# Patient Record
Sex: MALE | Race: WHITE | NOT HISPANIC OR LATINO | Employment: OTHER | URBAN - METROPOLITAN AREA
[De-identification: names, ages, dates, MRNs, and addresses within clinical notes are randomized per-mention and may not be internally consistent; named-entity substitution may affect disease eponyms.]

---

## 2017-02-23 ENCOUNTER — OFFICE VISIT (OUTPATIENT)
Dept: PEDIATRICS | Facility: CLINIC | Age: 67
End: 2017-02-23
Payer: COMMERCIAL

## 2017-02-23 VITALS
SYSTOLIC BLOOD PRESSURE: 146 MMHG | DIASTOLIC BLOOD PRESSURE: 72 MMHG | BODY MASS INDEX: 39.19 KG/M2 | HEIGHT: 68 IN | WEIGHT: 258.6 LBS | TEMPERATURE: 97.6 F | OXYGEN SATURATION: 97 % | HEART RATE: 79 BPM

## 2017-02-23 DIAGNOSIS — I25.10 CORONARY ARTERY DISEASE INVOLVING NATIVE CORONARY ARTERY OF NATIVE HEART WITHOUT ANGINA PECTORIS: ICD-10-CM

## 2017-02-23 DIAGNOSIS — E11.65 TYPE 2 DIABETES MELLITUS WITH HYPERGLYCEMIA, WITHOUT LONG-TERM CURRENT USE OF INSULIN (H): Primary | ICD-10-CM

## 2017-02-23 DIAGNOSIS — K59.00 CONSTIPATION, UNSPECIFIED CONSTIPATION TYPE: ICD-10-CM

## 2017-02-23 DIAGNOSIS — E78.5 HYPERLIPIDEMIA WITH TARGET LDL LESS THAN 70: ICD-10-CM

## 2017-02-23 DIAGNOSIS — R04.0 EPISTAXIS: ICD-10-CM

## 2017-02-23 DIAGNOSIS — I10 ESSENTIAL HYPERTENSION WITH GOAL BLOOD PRESSURE LESS THAN 140/90: ICD-10-CM

## 2017-02-23 PROCEDURE — 99214 OFFICE O/P EST MOD 30 MIN: CPT | Performed by: INTERNAL MEDICINE

## 2017-02-23 RX ORDER — GLIPIZIDE 5 MG/1
5 TABLET, FILM COATED, EXTENDED RELEASE ORAL
Qty: 90 TABLET | Refills: 1 | Status: SHIPPED | OUTPATIENT
Start: 2017-02-23 | End: 2017-03-13

## 2017-02-23 RX ORDER — PIOGLITAZONEHYDROCHLORIDE 45 MG/1
45 TABLET ORAL DAILY
Qty: 90 TABLET | Refills: 1 | Status: SHIPPED | OUTPATIENT
Start: 2017-02-23 | End: 2017-06-12

## 2017-02-23 NOTE — PROGRESS NOTES
SUBJECTIVE:                                                    Héctor Quispe is a 66 year old male who presents to clinic today for the following health issues:    Diabetes Follow-up      Patient is checking blood sugars: not regularly    Diabetic concerns: None     Symptoms of hypoglycemia (low blood sugar): none     Paresthesias (numbness or burning in feet) or sores: No     Lab Results   Component Value Date    A1C 6.2 09/13/2016    A1C 6.2 01/06/2016        Hyperlipidemia Follow-Up      Rate your low fat/cholesterol diet?: fair    Taking statin?  Yes, no muscle aches from statin    Other lipid medications/supplements?:  Gemfibrozil  Lab Results   Component Value Date    LDL 63 01/06/2016    LDL 50 05/06/2015    LDL 61 11/25/2014    LDL 49 11/21/2013        Hypertension Follow-up      Outpatient blood pressures are not being checked.    Low Salt Diet: no added salt       Vascular Disease Follow-up:  Coronary Artery Disease (CAD)      Chest pain or pressure, left side neck or arm pain: No    Shortness of breath/increased sweats/nausea with exertion: No    Pain in calves walking 1-2 blocks: No    Worsened or new symptoms since last visit: No    Nitroglycerin use: no    Daily aspirin use: Yes       Epistaxis       Duration: 2 weeks    Description (location/character/radiation): awakens with nosebleeds, takes from 2-4 hours to get it to stop, always left nare    Intensity:  moderate, severe    Accompanying signs and symptoms: none    History (similar episodes/previous evaluation): had same problem last winter    Precipitating or alleviating factors: seasonal    Therapies tried and outcome: humidifier in bedroom       Patient Active Problem List   Diagnosis     Prostate cancer (H)     Colon polyps     Coronary artery disease involving native coronary artery of native heart without angina pectoris     Hyperlipidemia with target LDL less than 70     Essential hypertension with goal blood pressure less than 140/90      Type 2 diabetes mellitus with hyperglycemia, without long-term current use of insulin (H)     Past Surgical History   Procedure Laterality Date     Stent, coronary, george       h/o coronary stent x 2       Social History   Substance Use Topics     Smoking status: Former Smoker     Types: Cigarettes     Quit date: 8/2/2002     Smokeless tobacco: Never Used     Alcohol use Yes      Comment: occ.     Family History   Problem Relation Age of Onset     Alzheimer Disease Mother      CANCER Father          Current Outpatient Prescriptions   Medication Sig Dispense Refill     pioglitazone (ACTOS) 45 MG tablet Take 1 tablet (45 mg) by mouth daily 90 tablet 1     glipiZIDE (GLIPIZIDE XL) 5 MG 24 hr tablet Take 1 tablet (5 mg) by mouth daily (with breakfast) 90 tablet 1     metFORMIN (GLUCOPHAGE) 1000 MG tablet Take 1 tablet (1,000 mg) by mouth 2 times daily (with meals) 180 tablet 1     sitagliptin (JANUVIA) 100 MG tablet Take 1 tablet (100 mg) by mouth daily 90 tablet 1     nitroglycerin (NITROSTAT) 0.4 MG SL tablet Place 1 tablet (0.4 mg) under the tongue every 5 minutes as needed for chest pain 30 tablet 6     lisinopril (PRINIVIL,ZESTRIL) 10 MG tablet Take 1 tablet (10 mg) by mouth daily 90 tablet 3     metoprolol (TOPROL-XL) 25 MG 24 hr tablet Take 0.5 tablets (12.5 mg) by mouth daily 45 tablet 3     tamsulosin (FLOMAX) 0.4 MG 24 hr capsule Take 1 capsule (0.4 mg) by mouth daily 90 capsule 3     gemfibrozil (LOPID) 600 MG tablet Take 1 tablet (600 mg) by mouth 2 times daily 180 tablet 3     atorvastatin (LIPITOR) 20 MG tablet Take 1 tablet (20 mg) by mouth daily 90 tablet 3     tiZANidine (ZANAFLEX) 4 MG tablet Take 1 tablet (4 mg) by mouth 3 times daily as needed for muscle spasms 30 tablet 0     aspirin 81 MG tablet Take 1 tablet (81 mg) by mouth daily 90 tablet 11     order for DME Accu-check glucometer or per insurance coverage 1 Units 0     order for DME Accu-check glucometer test lancets, or per insurance  "coverage  Testing once daily 3 Month 3     order for DME Accu-check glucometer test strips or per insurance coverage  Testing once daily 3 Month 3     omeprazole 20 MG tablet Take 20 mg by mouth daily.         ROS: The following systems have been completely reviewed and are negative except as noted in the HPI: CONSTITUTIONAL,  CARDIOVASCULAR, PULMONARY    OBJECTIVE:                                                    /72 (BP Location: Right arm, Patient Position: Chair, Cuff Size: Adult Large)  Pulse 79  Temp 97.6  F (36.4  C) (Tympanic)  Ht 5' 7.5\" (1.715 m)  Wt 258 lb 9.6 oz (117.3 kg)  SpO2 97%  BMI 39.9 kg/m2 Body mass index is 39.9 kg/(m^2).  GENERAL:  alert,  no distress  NECK: no tenderness, no adenopathy  RESP: lungs clear to auscultation - no rales, no rhonchi, no wheezes  CV: regular rates and rhythm, normal S1 S2, no S3 or S4 and no murmur, no click or rub   MS: extremities- no edema     ASSESSMENT/PLAN:                                                        ICD-10-CM    1. Type 2 diabetes mellitus with hyperglycemia, without long-term current use of insulin (H) E11.65 Comprehensive metabolic panel     Hemoglobin A1c     Albumin Random Urine Quantitative     pioglitazone (ACTOS) 45 MG tablet     glipiZIDE (GLIPIZIDE XL) 5 MG 24 hr tablet     metFORMIN (GLUCOPHAGE) 1000 MG tablet     sitagliptin (JANUVIA) 100 MG tablet     Pt will return for fasting labwork  Current rx-metformin/glipizide/actos/januvia  januvia is expensive for him-discussed alternatives (SGLT2 inh, victoza, glargine insulin).       2. Essential hypertension with goal blood pressure less than 140/90 I10 BP slightly above goal, continue current rx recheck next follow-up     3. Coronary artery disease involving native coronary artery of native heart without angina pectoris I25.10 Stable on current rx         4. Hyperlipidemia with target LDL less than 70 E78.5 Lipid panel reflex to direct LDL     Well controlled, return for fasting " labwork     5. Epistaxis R04.0 OTOLARYNGOLOGY REFERRAL     Recurrent epistaxis, referred to ENT     6. Constipation, unspecified constipation type K59.00 otc rx reviewed        Jesus Cherry MD  [facility]

## 2017-02-23 NOTE — PATIENT INSTRUCTIONS
INSTRUCTIONS FOR TODAY:     schedule visit with ENT   constipation-start Citrucel daily, add Senokot if needed   Weight Watchers   return for fasting labwork in the next week     Dr Cherry

## 2017-02-23 NOTE — NURSING NOTE
"Chief Complaint   Patient presents with     Other     nosebleeds       Initial /72 (BP Location: Right arm, Patient Position: Chair, Cuff Size: Adult Large)  Pulse 79  Temp 97.6  F (36.4  C) (Tympanic)  Ht 5' 7.5\" (1.715 m)  Wt 258 lb 9.6 oz (117.3 kg)  SpO2 97%  BMI 39.9 kg/m2 Estimated body mass index is 39.9 kg/(m^2) as calculated from the following:    Height as of this encounter: 5' 7.5\" (1.715 m).    Weight as of this encounter: 258 lb 9.6 oz (117.3 kg).  Medication Reconciliation: les Cifuentes LPN      "

## 2017-02-23 NOTE — MR AVS SNAPSHOT
After Visit Summary   2/23/2017    Héctor Quispe    MRN: [id number]           Patient Information     Date Of Birth          1950        Visit Information        Provider Department      2/23/2017 11:00 AM Jesus Cherry MD [facility]        Today's Diagnoses     Type 2 diabetes mellitus with hyperglycemia, without long-term current use of insulin (H)    -  1    Essential hypertension with goal blood pressure less than 140/90        Coronary artery disease involving native coronary artery of native heart without angina pectoris        Hyperlipidemia with target LDL less than 70        Epistaxis        Constipation, unspecified constipation type          Care Instructions    INSTRUCTIONS FOR TODAY:     schedule visit with ENT   constipation-start Citrucel daily, add Senokot if needed   Weight Watchers   return for fasting labwork in the next week     Dr Cherry          Follow-ups after your visit        Additional Services     OTOLARYNGOLOGY REFERRAL       Your provider has referred you to: [facility]: [facility] [place] [facility] [place] [phone]   [email or web address]  [place] [phone]   [email or web address]  [place] [phone]   [email or web address]    Please be aware that coverage of these services is subject to the terms and limitations of your health insurance plan.  Call member services at your health plan with any benefit or coverage questions.      Please bring the following with you to your appointment:    (1) Any X-Rays, CTs or MRIs which have been performed.  Contact the facility where they were done to arrange for  prior to your scheduled appointment.   (2) List of current medications  (3) This referral request   (4) Any documents/labs given to you for this referral                  Future tests that were ordered for you today     Open Future Orders        Priority Expected Expires Ordered    Comprehensive metabolic panel  "Routine  4/23/2017 2/23/2017    Lipid panel reflex to direct LDL Routine  4/23/2017 2/23/2017    Hemoglobin A1c Routine  4/23/2017 2/23/2017    Albumin Random Urine Quantitative Routine  4/23/2017 2/23/2017    OTOLARYNGOLOGY REFERRAL Routine  4/23/2017 2/23/2017            Who to contact     If you have questions or need follow up information about today's clinic visit or your schedule please contact [facility] [place] directly at [phone].  Normal or non-critical lab and imaging results will be communicated to you by Vivinohart, letter or phone within 4 business days after the clinic has received the results. If you do not hear from us within 7 days, please contact the clinic through NOBOT or phone. If you have a critical or abnormal lab result, we will notify you by phone as soon as possible.  Submit refill requests through NOBOT or call your pharmacy and they will forward the refill request to us. Please allow 3 business days for your refill to be completed.          Additional Information About Your Visit        VivinoharMedWhat Information     NOBOT gives you secure access to your electronic health record. If you see a primary care provider, you can also send messages to your care team and make appointments. If you have questions, please call your primary care clinic.  If you do not have a primary care provider, please call [phone] and they will assist you.        Care EveryWhere ID     This is your Care EveryWhere ID. This could be used by other organizations to access your [facility] medical records  [id number]        Your Vitals Were     Pulse Temperature Height Pulse Oximetry BMI (Body Mass Index)       79 97.6  F (36.4  C) (Tympanic) 5' 7.5\" (1.715 m) 97% 39.9 kg/m2        Blood Pressure from Last 3 Encounters:   02/23/17 146/72   09/13/16 124/70   03/01/16 118/68    Weight from Last 3 Encounters:   02/23/17 258 lb 9.6 oz (117.3 kg)   09/13/16 255 lb (115.7 kg)   03/01/16 243 lb (110.2 kg)         "         Where to get your medicines      These medications were sent to [id number] - [place], [place] - [address]  [address], [place] [address]     Phone:  [phone]     glipiZIDE 5 MG 24 hr tablet    metFORMIN 1000 MG tablet    pioglitazone 45 MG tablet         Some of these will need a paper prescription and others can be bought over the counter.  Ask your nurse if you have questions.     Bring a paper prescription for each of these medications     sitagliptin 100 MG tablet          Primary Care Provider Office Phone # Fax #    Jesus Cherry [phone]       [facility] [address] [place] [address] [address] [place] [address]        Thank you!     Thank you for choosing [facility]  for your care. Our goal is always to provide you with excellent care. Hearing back from our patients is one way we can continue to improve our services. Please take a few minutes to complete the written survey that you may receive in the mail after your visit with us. Thank you!             Your Updated Medication List - Protect others around you: Learn how to safely use, store and throw away your medicines at www.disposemymeds.org.          This list is accurate as of: 2/23/17 11:34 AM.  Always use your most recent med list.                   Brand Name Dispense Instructions for use    aspirin 81 MG tablet     90 tablet    Take 1 tablet (81 mg) by mouth daily       atorvastatin 20 MG tablet    LIPITOR    90 tablet    Take 1 tablet (20 mg) by mouth daily       gemfibrozil 600 MG tablet    LOPID    180 tablet    Take 1 tablet (600 mg) by mouth 2 times daily       glipiZIDE 5 MG 24 hr tablet    glipiZIDE XL    90 tablet    Take 1 tablet (5 mg) by mouth daily (with breakfast)       lisinopril 10 MG tablet    PRINIVIL/ZESTRIL    90 tablet    Take 1 tablet (10 mg) by mouth daily       metFORMIN 1000 MG tablet    GLUCOPHAGE    180 tablet    Take 1 tablet (1,000 mg) by mouth 2  times daily (with meals)       metoprolol 25 MG 24 hr tablet    TOPROL-XL    45 tablet    Take 0.5 tablets (12.5 mg) by mouth daily       nitroglycerin 0.4 MG sublingual tablet    NITROSTAT    30 tablet    Place 1 tablet (0.4 mg) under the tongue every 5 minutes as needed for chest pain       omeprazole 20 MG tablet      Take 20 mg by mouth daily.       * order for DME     1 Units    Accu-check glucometer or per insurance coverage       * order for DME     3 Month    Accu-check glucometer test lancets, or per insurance coverage Testing once daily       * order for DME     3 Month    Accu-check glucometer test strips or per insurance coverage Testing once daily       pioglitazone 45 MG tablet    ACTOS    90 tablet    Take 1 tablet (45 mg) by mouth daily       sitagliptin 100 MG tablet    JANUVIA    90 tablet    Take 1 tablet (100 mg) by mouth daily       tamsulosin 0.4 MG capsule    FLOMAX    90 capsule    Take 1 capsule (0.4 mg) by mouth daily       tiZANidine 4 MG tablet    ZANAFLEX    30 tablet    Take 1 tablet (4 mg) by mouth 3 times daily as needed for muscle spasms       * Notice:  This list has 3 medication(s) that are the same as other medications prescribed for you. Read the directions carefully, and ask your doctor or other care provider to review them with you.

## 2017-02-28 DIAGNOSIS — E11.65 TYPE 2 DIABETES MELLITUS WITH HYPERGLYCEMIA, WITHOUT LONG-TERM CURRENT USE OF INSULIN (H): ICD-10-CM

## 2017-02-28 DIAGNOSIS — E78.5 HYPERLIPIDEMIA WITH TARGET LDL LESS THAN 70: ICD-10-CM

## 2017-02-28 LAB
ALBUMIN SERPL-MCNC: 3.8 G/DL (ref 3.4–5)
ALP SERPL-CCNC: 64 U/L (ref 40–150)
ALT SERPL W P-5'-P-CCNC: 22 U/L (ref 0–70)
ANION GAP SERPL CALCULATED.3IONS-SCNC: 6 MMOL/L (ref 3–14)
AST SERPL W P-5'-P-CCNC: 13 U/L (ref 0–45)
BILIRUB SERPL-MCNC: 0.3 MG/DL (ref 0.2–1.3)
BUN SERPL-MCNC: 18 MG/DL (ref 7–30)
CALCIUM SERPL-MCNC: 9 MG/DL (ref 8.5–10.1)
CHLORIDE SERPL-SCNC: 106 MMOL/L (ref 94–109)
CHOLEST SERPL-MCNC: 125 MG/DL
CO2 SERPL-SCNC: 26 MMOL/L (ref 20–32)
CREAT SERPL-MCNC: 0.9 MG/DL (ref 0.66–1.25)
CREAT UR-MCNC: 143 MG/DL
GFR SERPL CREATININE-BSD FRML MDRD: 84 ML/MIN/1.7M2
GLUCOSE SERPL-MCNC: 139 MG/DL (ref 70–99)
HBA1C MFR BLD: 6.3 % (ref 4.3–6)
HDLC SERPL-MCNC: 64 MG/DL
LDLC SERPL CALC-MCNC: 49 MG/DL
MICROALBUMIN UR-MCNC: 266 MG/L
MICROALBUMIN/CREAT UR: 186.01 MG/G CR (ref 0–17)
NONHDLC SERPL-MCNC: 61 MG/DL
POTASSIUM SERPL-SCNC: 4.4 MMOL/L (ref 3.4–5.3)
PROT SERPL-MCNC: 7.2 G/DL (ref 6.8–8.8)
SODIUM SERPL-SCNC: 138 MMOL/L (ref 133–144)
TRIGL SERPL-MCNC: 60 MG/DL

## 2017-02-28 PROCEDURE — 82043 UR ALBUMIN QUANTITATIVE: CPT | Performed by: INTERNAL MEDICINE

## 2017-02-28 PROCEDURE — 80053 COMPREHEN METABOLIC PANEL: CPT | Performed by: INTERNAL MEDICINE

## 2017-02-28 PROCEDURE — 80061 LIPID PANEL: CPT | Performed by: INTERNAL MEDICINE

## 2017-02-28 PROCEDURE — 83036 HEMOGLOBIN GLYCOSYLATED A1C: CPT | Performed by: INTERNAL MEDICINE

## 2017-02-28 PROCEDURE — 36415 COLL VENOUS BLD VENIPUNCTURE: CPT | Performed by: INTERNAL MEDICINE

## 2017-03-01 ENCOUNTER — TRANSFERRED RECORDS (OUTPATIENT)
Dept: HEALTH INFORMATION MANAGEMENT | Facility: CLINIC | Age: 67
End: 2017-03-01

## 2017-03-03 DIAGNOSIS — E11.65 TYPE 2 DIABETES MELLITUS WITH HYPERGLYCEMIA, WITHOUT LONG-TERM CURRENT USE OF INSULIN (H): ICD-10-CM

## 2017-03-03 RX ORDER — PIOGLITAZONEHYDROCHLORIDE 45 MG/1
45 TABLET ORAL DAILY
Qty: 90 TABLET | Refills: 1 | Status: CANCELLED | OUTPATIENT
Start: 2017-03-03

## 2017-03-13 DIAGNOSIS — E11.65 TYPE 2 DIABETES MELLITUS WITH HYPERGLYCEMIA, WITHOUT LONG-TERM CURRENT USE OF INSULIN (H): ICD-10-CM

## 2017-03-13 NOTE — TELEPHONE ENCOUNTER
Glipizide 5mg tab         Last Written Prescription Date: 02/23/17  Last Fill Quantity: 90, # refills: 1  [facility] Office [facility] with [facility] or [facility] prescribing provider:  02/23/17        BP Readings from Last 3 Encounters:   02/23/17 146/72   09/13/16 124/70   03/01/16 118/68     Lab Results   Component Value Date    MICROL 266 02/28/2017     No results found for: MICROALBUMIN  Creatinine   Date Value Ref Range Status   02/28/2017 0.90 0.66 - 1.25 mg/dL Final   ]  GFR Estimate   Date Value Ref Range Status   02/28/2017 84 >60 mL/min/1.7m2 Final     Comment:     Non  GFR Calc   01/06/2016 70 >60 mL/min/1.7m2 Final     Comment:     Non  GFR Calc   05/06/2015 75 >60 mL/min/1.7m2 Final     GFR Estimate If Black   Date Value Ref Range Status   02/28/2017 >90   GFR Calc   >60 mL/min/1.7m2 Final   01/06/2016 85 >60 mL/min/1.7m2 Final     Comment:      GFR Calc   05/06/2015 >90 >60 mL/min/1.7m2 Final     Lab Results   Component Value Date    CHOL 125 02/28/2017     Lab Results   Component Value Date    HDL 64 02/28/2017     Lab Results   Component Value Date    LDL 49 02/28/2017    LDL 61 11/25/2014     Lab Results   Component Value Date    TRIG 60 02/28/2017     Lab Results   Component Value Date    CHOLHDLRATIO 1.8 05/06/2015     Lab Results   Component Value Date    AST 13 02/28/2017     Lab Results   Component Value Date    ALT 22 02/28/2017     Lab Results   Component Value Date    A1C 6.3 02/28/2017    A1C 6.2 09/13/2016    A1C 6.2 01/06/2016    A1C 7.2 05/06/2015    A1C 6.8 11/25/2014     Potassium   Date Value Ref Range Status   02/28/2017 4.4 3.4 - 5.3 mmol/L Final

## 2017-03-13 NOTE — TELEPHONE ENCOUNTER
Metformin 1000mg tab         Last Written Prescription Date: 02/23/17  Last Fill Quantity: 180, # refills: 1  Last Office Visit with [facility], P or [facility] prescribing provider:  02/23/17        BP Readings from Last 3 Encounters:   02/23/17 146/72   09/13/16 124/70   03/01/16 118/68     Lab Results   Component Value Date    MICROL 266 02/28/2017     No results found for: MICROALBUMIN  Creatinine   Date Value Ref Range Status   02/28/2017 0.90 0.66 - 1.25 mg/dL Final   ]  GFR Estimate   Date Value Ref Range Status   02/28/2017 84 >60 mL/min/1.7m2 Final     Comment:     Non  GFR Calc   01/06/2016 70 >60 mL/min/1.7m2 Final     Comment:     Non  GFR Calc   05/06/2015 75 >60 mL/min/1.7m2 Final     GFR Estimate If Black   Date Value Ref Range Status   02/28/2017 >90   GFR Calc   >60 mL/min/1.7m2 Final   01/06/2016 85 >60 mL/min/1.7m2 Final     Comment:      GFR Calc   05/06/2015 >90 >60 mL/min/1.7m2 Final     Lab Results   Component Value Date    CHOL 125 02/28/2017     Lab Results   Component Value Date    HDL 64 02/28/2017     Lab Results   Component Value Date    LDL 49 02/28/2017    LDL 61 11/25/2014     Lab Results   Component Value Date    TRIG 60 02/28/2017     Lab Results   Component Value Date    CHOLHDLRATIO 1.8 05/06/2015     Lab Results   Component Value Date    AST 13 02/28/2017     Lab Results   Component Value Date    ALT 22 02/28/2017     Lab Results   Component Value Date    A1C 6.3 02/28/2017    A1C 6.2 09/13/2016    A1C 6.2 01/06/2016    A1C 7.2 05/06/2015    A1C 6.8 11/25/2014     Potassium   Date Value Ref Range Status   02/28/2017 4.4 3.4 - 5.3 mmol/L Final

## 2017-03-16 RX ORDER — GLIPIZIDE 5 MG/1
5 TABLET, FILM COATED, EXTENDED RELEASE ORAL
Qty: 90 TABLET | Refills: 1 | Status: SHIPPED | OUTPATIENT
Start: 2017-03-16 | End: 2017-08-09

## 2017-03-16 NOTE — TELEPHONE ENCOUNTER
Refill approved through [facility] protocol.  Yusra Delgado RN  [employer]  [phone]  Sent to mail order.

## 2017-06-12 DIAGNOSIS — E11.65 TYPE 2 DIABETES MELLITUS WITH HYPERGLYCEMIA, WITHOUT LONG-TERM CURRENT USE OF INSULIN (H): ICD-10-CM

## 2017-06-13 NOTE — TELEPHONE ENCOUNTER
pioglitazone (ACTOS) 45 MG tablet         Last Written Prescription Date: 2/23/2017  Last Fill Quantity: 90, # refills: 1  Last Office Visit with G, P or [facility] prescribing provider:  2/23/2017        BP Readings from Last 3 Encounters:   02/23/17 146/72   09/13/16 124/70   03/01/16 118/68     Lab Results   Component Value Date    MICROL 266 02/28/2017     Lab Results   Component Value Date    UMALCR 186.01 02/28/2017     Creatinine   Date Value Ref Range Status   02/28/2017 0.90 0.66 - 1.25 mg/dL Final   ]  GFR Estimate   Date Value Ref Range Status   02/28/2017 84 >60 mL/min/1.7m2 Final     Comment:     Non  GFR Calc   01/06/2016 70 >60 mL/min/1.7m2 Final     Comment:     Non  GFR Calc   05/06/2015 75 >60 mL/min/1.7m2 Final     GFR Estimate If Black   Date Value Ref Range Status   02/28/2017 >90   GFR Calc   >60 mL/min/1.7m2 Final   01/06/2016 85 >60 mL/min/1.7m2 Final     Comment:      GFR Calc   05/06/2015 >90 >60 mL/min/1.7m2 Final     Lab Results   Component Value Date    CHOL 125 02/28/2017     Lab Results   Component Value Date    HDL 64 02/28/2017     Lab Results   Component Value Date    LDL 49 02/28/2017     Lab Results   Component Value Date    TRIG 60 02/28/2017     Lab Results   Component Value Date    CHOLHDLRATIO 1.8 05/06/2015     Lab Results   Component Value Date    AST 13 02/28/2017     Lab Results   Component Value Date    ALT 22 02/28/2017     Lab Results   Component Value Date    A1C 6.3 02/28/2017    A1C 6.2 09/13/2016    A1C 6.2 01/06/2016    A1C 7.2 05/06/2015    A1C 6.8 11/25/2014     Potassium   Date Value Ref Range Status   02/28/2017 4.4 3.4 - 5.3 mmol/L Final

## 2017-06-14 RX ORDER — PIOGLITAZONEHYDROCHLORIDE 45 MG/1
45 TABLET ORAL DAILY
Qty: 90 TABLET | Refills: 0 | Status: SHIPPED | OUTPATIENT
Start: 2017-06-14 | End: 2017-08-17

## 2017-06-14 NOTE — TELEPHONE ENCOUNTER
Prescription approved per [facility] Refill Protocol.    Barbie TURNER RN, BSN, PHN  [facility] RN

## 2017-06-27 ENCOUNTER — HOSPITAL ENCOUNTER (EMERGENCY)
Facility: CLINIC | Age: 67
Discharge: HOME | End: 2017-06-27
Attending: PHYSICIAN ASSISTANT | Admitting: PHYSICIAN ASSISTANT
Payer: MEDICARE

## 2017-06-27 ENCOUNTER — MYC MEDICAL ADVICE (OUTPATIENT)
Dept: PEDIATRICS | Facility: CLINIC | Age: 67
End: 2017-06-27

## 2017-06-27 VITALS
RESPIRATION RATE: 18 BRPM | SYSTOLIC BLOOD PRESSURE: 141 MMHG | HEIGHT: 69 IN | DIASTOLIC BLOOD PRESSURE: 76 MMHG | BODY MASS INDEX: 37.03 KG/M2 | WEIGHT: 250 LBS | OXYGEN SATURATION: 95 % | TEMPERATURE: 98.1 F

## 2017-06-27 DIAGNOSIS — R04.0 EPISTAXIS: ICD-10-CM

## 2017-06-27 PROCEDURE — 99282 EMERGENCY DEPT VISIT SF MDM: CPT

## 2017-06-27 RX ORDER — OXYMETAZOLINE HYDROCHLORIDE 0.05 G/100ML
SPRAY NASAL
Status: DISCONTINUED
Start: 2017-06-27 | End: 2017-06-27 | Stop reason: HOSPADM

## 2017-06-27 ASSESSMENT — ENCOUNTER SYMPTOMS
NUMBNESS: 0
HEADACHES: 1
BACK PAIN: 0
SHORTNESS OF BREATH: 0
VOMITING: 0
COUGH: 0
DIZZINESS: 0
DIARRHEA: 0
NAUSEA: 0
NECK PAIN: 0
ABDOMINAL PAIN: 0

## 2017-06-27 NOTE — ED AVS SNAPSHOT
[facility] Emergency Department    [address]    [facility] [place] [address]    Phone:  [phone]    Fax:  [phone]                                       Héctor Quispe   MRN: [id number]    Department:  [facility] Emergency Department   Date of Visit:  6/27/2017           After Visit Summary Signature Page     I have received my discharge instructions, and my questions have been answered. I have discussed any challenges I see with this plan with the nurse or doctor.    ..........................................................................................................................................  Patient/Patient Representative Signature      ..........................................................................................................................................  Patient Representative Print Name and Relationship to Patient    ..................................................               ................................................  Date                                            Time    ..........................................................................................................................................  Reviewed by Signature/Title    ...................................................              ..............................................  Date                                                            Time

## 2017-06-27 NOTE — ED AVS SNAPSHOT
[facility]    [address]    [facility] [address]    Phone:  [phone]    Fax:  [phone]                                       Héctor [place]   MRN: [id number]    Department:  [facility] Department   Date of Visit:  6/27/2017           Patient Information     Date Of Birth          1950        Your diagnoses for this visit were:     Epistaxis        You were seen by Zuleyma Edgar PA-C.      Follow-up Information     Follow up with Jesus Cherry MD In 2 days.    Specialties:  Internal Medicine, Pediatrics    Why:  As needed    Contact information:    [address] [place] [address]  [phone]          Follow up with [facility].    Specialty:  EMERGENCY MEDICINE    Why:  If symptoms worsen    Contact information:    [address]  [facility] [id number] [phone]        Discharge Instructions       Please follow up with ENT as needed.       Nosebleed (Adult)    Bleeding from the nose most commonly occurs because of injury or drying and cracking of the inner lining of the nose. Most nosebleeds are because of dry air or nose-picking. They can occur during a common cold or an allergy attack. They can also occur on a very hot day, or from dry air in the winter.  If the bleeding site is found, it may be cauterized. This means it is treated to cause a blood clot to form. This may be done with a chemical, heat, or electricity. If the bleeding continues after the site is cauterized, or if the site cannot be found, packing may be put in your nose. This is to apply pressure and stop the bleeding. The packing may be made of gauze or sponge. A small balloon catheter is sometimes used. These must be removed by your doctor. Some types of packing dissolve on their own.  Home care    If packing was put in your nose, unless told otherwise,  do not pull on it or try to remove it yourself. You will be given an appointment to have it removed. You may also have been given antibiotics to prevent a sinus infection. If so, finish all of the medicine.    Do not blow your nose for 12 hours after the bleeding stops. This will allow a strong blood clot to form. Do not pick your nose. This may restart bleeding.    Avoid drinking alcohol and hot liquids for the next 2 days. Alcohol or hot liquids in your mouth can dilate blood vessels in your nose. This can cause bleeding to start again.    Do not take ibuprofen, naproxen, or medicines that contain aspirin. These thin the blood and may cause your nose to bleed. You may take acetaminophen for pain, unless another pain medicine was prescribed.    If the bleeding starts again, sit up and lean forward to prevent swallowing blood. Pinch your nose tightly on both sides, as shown above, for 10 to 15 minutes. Time yourself. Don t release the pressure on your nose until 10 minutes is up. If bleeding does not stop, continue to pinch your nose and call your healthcare provider or return to this facility.    If you have a cold, allergies, or dry nasal membranes, lubricate the nasal passages. Apply a small amount of petroleum jelly inside the nose with a cotton swab twice a day (morning and night).    Avoid overheating your home. This can dry the air and make your condition worse.    Put a humidifier in the room where you sleep. This will add moisture to the air.    Use a saline nasal spray to keep nasal passages moist.    Do not pick your nose. Keep fingernails trimmed to decrease risk of bleeds.    Do not smoke.  Follow-up care  Follow up with your healthcare provider, or as advised. Nasal packing should be rechecked or removed within 2 to 3 days.  When to seek medical advice  Call your healthcare provider right away if any of these occur.    You have another nosebleed that you cannot control    Dizziness, weakness, or  fainting    You become tired or confused    Fever of 100.4 F (38 C) or higher, or as directed by your healthcare provider    Headache    Sinus or facial pain    Shortness of breath or trouble breathing  Date Last Reviewed: 3/22/2015    8924-4998 [employer]. [address] [place] [address]. All rights reserved. This information is not intended as a substitute for professional medical care. Always follow your healthcare professional's instructions.          24 Hour Appointment Hotline       To make an appointment at any [facility], call [phone] ([phone]). If you don't have a family doctor or clinic, we will help you find one. [facility] are conveniently located to serve the needs of you and your family.             Review of your medicines      Our records show that you are taking the medicines listed below. If these are incorrect, please call your family doctor or clinic.        Dose / Directions Last dose taken    aspirin 81 MG tablet   Dose:  81 mg   Quantity:  90 tablet        Take 1 tablet (81 mg) by mouth daily   Refills:  11        atorvastatin 20 MG tablet   Commonly known as:  LIPITOR   Dose:  20 mg   Quantity:  90 tablet        Take 1 tablet (20 mg) by mouth daily   Refills:  3        gemfibrozil 600 MG tablet   Commonly known as:  LOPID   Dose:  600 mg   Quantity:  180 tablet        Take 1 tablet (600 mg) by mouth 2 times daily   Refills:  3        glipiZIDE 5 MG 24 hr tablet   Commonly known as:  glipiZIDE XL   Dose:  5 mg   Quantity:  90 tablet        Take 1 tablet (5 mg) by mouth daily (with breakfast)   Refills:  1        lisinopril 10 MG tablet   Commonly known as:  PRINIVIL/ZESTRIL   Dose:  10 mg   Quantity:  90 tablet        Take 1 tablet (10 mg) by mouth daily   Refills:  3        metFORMIN 1000 MG tablet   Commonly known as:  GLUCOPHAGE   Dose:  1000 mg   Quantity:  180 tablet        Take 1 tablet (1,000 mg) by mouth 2 times daily (with meals)    Refills:  1        metoprolol 25 MG 24 hr tablet   Commonly known as:  TOPROL-XL   Dose:  12.5 mg   Quantity:  45 tablet        Take 0.5 tablets (12.5 mg) by mouth daily   Refills:  3        nitroglycerin 0.4 MG sublingual tablet   Commonly known as:  NITROSTAT   Dose:  0.4 mg   Quantity:  30 tablet        Place 1 tablet (0.4 mg) under the tongue every 5 minutes as needed for chest pain   Refills:  6        omeprazole 20 MG tablet   Dose:  20 mg        Take 20 mg by mouth daily.   Refills:  0        * order for DME   Quantity:  1 Units        Accu-check glucometer or per insurance coverage   Refills:  0        * order for DME   Quantity:  3 Month        Accu-check glucometer test lancets, or per insurance coverage Testing once daily   Refills:  3        * order for DME   Quantity:  3 Month        Accu-check glucometer test strips or per insurance coverage Testing once daily   Refills:  3        pioglitazone 45 MG tablet   Commonly known as:  ACTOS   Dose:  45 mg   Quantity:  90 tablet        Take 1 tablet (45 mg) by mouth daily   Refills:  0        sitagliptin 100 MG tablet   Commonly known as:  JANUVIA   Dose:  100 mg   Quantity:  90 tablet        Take 1 tablet (100 mg) by mouth daily   Refills:  1        tamsulosin 0.4 MG capsule   Commonly known as:  FLOMAX   Dose:  0.4 mg   Quantity:  90 capsule        Take 1 capsule (0.4 mg) by mouth daily   Refills:  3        tiZANidine 4 MG tablet   Commonly known as:  ZANAFLEX   Dose:  4 mg   Quantity:  30 tablet        Take 1 tablet (4 mg) by mouth 3 times daily as needed for muscle spasms   Refills:  0        * Notice:  This list has 3 medication(s) that are the same as other medications prescribed for you. Read the directions carefully, and ask your doctor or other care provider to review them with you.            Orders Needing Specimen Collection     None      Pending Results     No orders found from 6/25/2017 to 6/28/2017.            Pending Culture Results     No  orders found from 6/25/2017 to 6/28/2017.            Pending Results Instructions     If you had any lab results that were not finalized at the time of your Discharge, you can call the ED Lab Result RN at [phone]. You will be contacted by this team for any positive Lab results or changes in treatment. The nurses are available 7 days a week from 10A to 6:30P.  You can leave a message 24 hours per day and they will return your call.        Test Results From Your Hospital Stay               Clinical Quality Measure: Blood Pressure Screening     Your blood pressure was checked while you were in the emergency department today. The last reading we obtained was  BP: 141/76 . Please read the guidelines below about what these numbers mean and what you should do about them.  If your systolic blood pressure (the top number) is less than 120 and your diastolic blood pressure (the bottom number) is less than 80, then your blood pressure is normal. There is nothing more that you need to do about it.  If your systolic blood pressure (the top number) is 120-139 or your diastolic blood pressure (the bottom number) is 80-89, your blood pressure may be higher than it should be. You should have your blood pressure rechecked within a year by a primary care provider.  If your systolic blood pressure (the top number) is 140 or greater or your diastolic blood pressure (the bottom number) is 90 or greater, you may have high blood pressure. High blood pressure is treatable, but if left untreated over time it can put you at risk for heart attack, stroke, or kidney failure. You should have your blood pressure rechecked by a primary care provider within the next 4 weeks.  If your provider in the emergency department today gave you specific instructions to follow-up with your doctor or provider even sooner than that, you should follow that instruction and not wait for up to 4 weeks for your follow-up visit.        Thank you for choosing  [facility]       Thank you for choosing [facility] for your care. Our goal is always to provide you with excellent care. Hearing back from our patients is one way we can continue to improve our services. Please take a few minutes to complete the written survey that you may receive in the mail after you visit with us. Thank you!        [employer] Information     [employer] gives you secure access to your electronic health record. If you see a primary care provider, you can also send messages to your care team and make appointments. If you have questions, please call your primary care clinic.  If you do not have a primary care provider, please call [phone] and they will assist you.        Care EveryWhere ID     This is your Care EveryWhere ID. This could be used by other organizations to access your [facility] medical records  [id number]        Equal Access to Services     KRAIG VOSS : Bon Donald, shanthi malloy, mitchell de santiago, lit woodruff. So [facility] [phone].    ATENCIÓN: Si habla español, tiene a zapata disposición servicios gratuitos de asistencia lingüística. Llame al [phone].    We comply with applicable federal civil rights laws and [place] laws. We do not discriminate on the basis of race, color, national origin, age, disability sex, sexual orientation or gender identity.            After Visit Summary       This is your record. Keep this with you and show to your community pharmacist(s) and doctor(s) at your next visit.

## 2017-06-27 NOTE — ED PROVIDER NOTES
History   Chief Complaint:  Epistaxis    HPI   Héctor [place] is a 67 year old male with a history of type 2 diabetes, hypertension, hyperlipidemia, CAD and left sided nose bleeds who presents with a left sided epistaxis that began at 0730 today, prompting him to visit the ED. He reported he also had a nose bleed yesterday. He states last year he had the left side of his nose cauterized at ENT's office and has not had any problems until yesterday. He states he got the nose bleed to stop for a few minutes by pinching his nose but once he coughed or moved it started up again. He reports he took an Eri-[place] plus last night and an aspirin today.     Allergies:  No known drug allergies    Medications:    pioglitazone (ACTOS) 45 MG tablet  glipiZIDE (GLIPIZIDE XL) 5 MG 24 hr tablet  metFORMIN (GLUCOPHAGE) 1000 MG tablet  sitagliptin (JANUVIA) 100 MG tablet  nitroglycerin (NITROSTAT) 0.4 MG SL tablet  lisinopril (PRINIVIL,ZESTRIL) 10 MG tablet  metoprolol (TOPROL-XL) 25 MG 24 hr tablet  tamsulosin (FLOMAX) 0.4 MG 24 hr capsule  gemfibrozil (LOPID) 600 MG tablet  atorvastatin (LIPITOR) 20 MG tablet  tiZANidine (ZANAFLEX) 4 MG tablet  aspirin 81 MG tablet  omeprazole 20 MG tablet    Past Medical History:    CAD  Hypertension  type 2 diabtes  Hyperlipidemia  Prostate cancer    Past Surgical History:    ENT surgery  Prostate  Stent x 2    Family History:    A,zhemier disease  cancer    Social History:  Marital Status:   [2]  Smoking status: former - quit 8/2/02  Alcohol use: yes    Review of Systems   HENT: Positive for nosebleeds.    Respiratory: Negative for cough and shortness of breath.    Gastrointestinal: Negative for abdominal pain, diarrhea, nausea and vomiting.   Musculoskeletal: Negative for back pain and neck pain.   Neurological: Positive for headaches. Negative for dizziness, syncope and numbness.   All other systems reviewed and are negative.    Physical Exam   Patient Vitals for the past 24 hrs:    "BP Temp Temp src Heart Rate Resp SpO2 Height Weight   06/27/17 1615 - - - - - 95 % - -   06/27/17 1613 164/78 98.1  F (36.7  C) Oral 82 18 94 % 1.753 m (5' 9\") 113.4 kg (250 lb)     Physical Exam  Constitutional: Alert, attentive  HENT:    Nose: External nose is normal. No active bleeding on exam.    Mouth/Throat: Oropharynx is clear, mucous membranes are moist. No blood down the back of the throat.  Eyes: EOM are normal. Pupils are equal, round, and reactive to light.   Chest: Effort normal   MSK: Normal range of motion.   Neurological: Alert, attentive  Skin: Skin is warm and dry.     Emergency Department Course     Emergency Department Course:  Past medical records, nursing notes, and vitals reviewed.   I performed an exam of the patient and obtained history, as documented above.  I stopped the nosebleed with Afrin and a nasal clamp.  I rechecked the patient.  Findings and plan explained to the Patient and spouse. Patient discharged home with instructions regarding supportive care, medications, and reasons to return. The importance of close follow-up was reviewed.     Impression & Plan    Medical Decision Making:  Héctor [place] is a 67 year old male who presents for evaluation of nasal bleeding and epistaxis.  The bleeding was controlled with interventions in the ED and therefore supportive outpatient management is indicated.  Close follow-up with ENT and primary care; see discharge instructions.   Afrin and a nasal clamp was used to stop the bleeding source. No recurrence of bleeding after 1/2 hour of observation in the ED and a few episodes of coughing. The bleeding stopped and did not restart here in ED, therefore no nasal packing is indicated although did offer to do this as prophylaxis given that the bleeding has been on and off for the whole day. Patient would prefer to take nasal clamp home and use that, returning here if needed.  Discussed with patient to use humidity and vaseline or bacitracin in nares " bid for the next week.      Diagnosis:    ICD-10-CM   1. Epistaxis R04.0     Disposition:  Discharged to home    Lynnette Nick  6/27/2017   [facility] EMERGENCY DEPARTMENT    I, Lynnette Romero, am serving as a scribe at 4:31 PM on 6/27/2017 to document services personally performed by Zuleyma Edgar PA based on my observations and the provider's statements to me.        Zuleyma Edgar PA-C  06/27/17 1756

## 2017-06-27 NOTE — TELEPHONE ENCOUNTER
Patient has had nosebleed for 2.5 hrs today that will not stop.  Advised he proceed to the ER for treatment.  RALPH Rodriguez RN

## 2017-06-27 NOTE — ED NOTES
"Hx nosebleeds, cauterized a few months ago.  Yesterday bled for several hours, then stopped.  Started up again this morning around 0730, and cannot get bleeding to stop.  States, \"it's always the same side..the left nostril.\" Denies pain/dizziness.  Draining down throat \"constantly\". ABCD's intact; A/O x 4.   "

## 2017-06-27 NOTE — DISCHARGE INSTRUCTIONS
Please follow up with ENT as needed.       Nosebleed (Adult)    Bleeding from the nose most commonly occurs because of injury or drying and cracking of the inner lining of the nose. Most nosebleeds are because of dry air or nose-picking. They can occur during a common cold or an allergy attack. They can also occur on a very hot day, or from dry air in the winter.  If the bleeding site is found, it may be cauterized. This means it is treated to cause a blood clot to form. This may be done with a chemical, heat, or electricity. If the bleeding continues after the site is cauterized, or if the site cannot be found, packing may be put in your nose. This is to apply pressure and stop the bleeding. The packing may be made of gauze or sponge. A small balloon catheter is sometimes used. These must be removed by your doctor. Some types of packing dissolve on their own.  Home care    If packing was put in your nose, unless told otherwise, do not pull on it or try to remove it yourself. You will be given an appointment to have it removed. You may also have been given antibiotics to prevent a sinus infection. If so, finish all of the medicine.    Do not blow your nose for 12 hours after the bleeding stops. This will allow a strong blood clot to form. Do not pick your nose. This may restart bleeding.    Avoid drinking alcohol and hot liquids for the next 2 days. Alcohol or hot liquids in your mouth can dilate blood vessels in your nose. This can cause bleeding to start again.    Do not take ibuprofen, naproxen, or medicines that contain aspirin. These thin the blood and may cause your nose to bleed. You may take acetaminophen for pain, unless another pain medicine was prescribed.    If the bleeding starts again, sit up and lean forward to prevent swallowing blood. Pinch your nose tightly on both sides, as shown above, for 10 to 15 minutes. Time yourself. Don t release the pressure on your nose until 10 minutes is up. If bleeding  does not stop, continue to pinch your nose and call your healthcare provider or return to this facility.    If you have a cold, allergies, or dry nasal membranes, lubricate the nasal passages. Apply a small amount of petroleum jelly inside the nose with a cotton swab twice a day (morning and night).    Avoid overheating your home. This can dry the air and make your condition worse.    Put a humidifier in the room where you sleep. This will add moisture to the air.    Use a saline nasal spray to keep nasal passages moist.    Do not pick your nose. Keep fingernails trimmed to decrease risk of bleeds.    Do not smoke.  Follow-up care  Follow up with your healthcare provider, or as advised. Nasal packing should be rechecked or removed within 2 to 3 days.  When to seek medical advice  Call your healthcare provider right away if any of these occur.    You have another nosebleed that you cannot control    Dizziness, weakness, or fainting    You become tired or confused    Fever of 100.4 F (38 C) or higher, or as directed by your healthcare provider    Headache    Sinus or facial pain    Shortness of breath or trouble breathing  Date Last Reviewed: 3/22/2015    [id number] [employer]. [address] [place] [address], [place] [address]. All rights reserved. This information is not intended as a substitute for professional medical care. Always follow your healthcare professional's instructions.

## 2017-07-02 ENCOUNTER — NURSE TRIAGE (OUTPATIENT)
Dept: NURSING | Facility: CLINIC | Age: 67
End: 2017-07-02

## 2017-07-02 NOTE — TELEPHONE ENCOUNTER
"  Reason for Disposition    [1] Continuous (nonstop) coughing interferes with work or school AND [2] no improvement using cough treatment per Care Advice     \"[place] can get a medication over the phone? I have had a cough(productive greenish yellow) for a week now. I have tried Mucinex and Sudafed. I also have a cold and sinus pressure, blowing my nose it's yellow. No fever or other sx.\" Triaged and advised UC. Patient prefers appt with PCP, but will be seen sooner if sx worsen. Transferred to scheduling for appt.    Additional Information    Negative: Severe difficulty breathing (e.g., struggling for each breath, speaks in single words)    Negative: Bluish lips, tongue, or face now    Negative: [1] Difficulty breathing AND [2] exposure to flames, smoke, or fumes    Negative: [1] Stridor AND [2] difficulty breathing    Negative: Sounds like a life-threatening emergency to the triager    Negative: [1] Previous asthma attacks AND [2] this feels like asthma attack    Negative: Dry (non-productive) cough (i.e., no sputum or minimal clear sputum)    Negative: Chest pain  (Exception: MILD central chest pain, present only when coughing)    Negative: Difficulty breathing    Negative: Patient sounds very sick or weak to the triager    Negative: [1] Coughed up blood AND [2] > 1 tablespoon (15 ml) (Exception: blood-tinged sputum)    Negative: Fever > 103 F (39.4 C)    Negative: [1] Fever > 101 F (38.3 C) AND [2] age > 60    Negative: [1] Fever > 101 F (38.3 C) AND [2] bedridden (e.g., nursing home patient, CVA, chronic illness, recovering from surgery)    Negative: [1] Fever > 100.5 F (38.1 C) AND [2] diabetes mellitus or weak immune system (e.g., HIV positive, cancer chemo, splenectomy, organ transplant, chronic steroids)    Negative: Wheezing is present    Negative: SEVERE coughing spells (e.g., whooping sound after coughing, vomiting after coughing)    Protocols used: COUGH - ACUTE " PRODUCTIVE-ADULT-AH

## 2017-07-05 ENCOUNTER — OFFICE VISIT (OUTPATIENT)
Dept: PEDIATRICS | Facility: CLINIC | Age: 67
End: 2017-07-05
Payer: COMMERCIAL

## 2017-07-05 VITALS
SYSTOLIC BLOOD PRESSURE: 130 MMHG | WEIGHT: 250 LBS | HEART RATE: 79 BPM | DIASTOLIC BLOOD PRESSURE: 60 MMHG | RESPIRATION RATE: 18 BRPM | BODY MASS INDEX: 36.92 KG/M2 | OXYGEN SATURATION: 96 % | TEMPERATURE: 98.8 F

## 2017-07-05 DIAGNOSIS — J32.9 SINUSITIS, UNSPECIFIED CHRONICITY, UNSPECIFIED LOCATION: Primary | ICD-10-CM

## 2017-07-05 DIAGNOSIS — R01.1 HEART MURMUR: ICD-10-CM

## 2017-07-05 PROCEDURE — 99213 OFFICE O/P EST LOW 20 MIN: CPT | Mod: GE | Performed by: INTERNAL MEDICINE

## 2017-07-05 RX ORDER — DOXYCYCLINE 100 MG/1
100 TABLET ORAL 2 TIMES DAILY
Qty: 14 TABLET | Refills: 0 | Status: SHIPPED | OUTPATIENT
Start: 2017-07-05 | End: 2017-07-12

## 2017-07-05 RX ORDER — FLUTICASONE PROPIONATE 50 MCG
1-2 SPRAY, SUSPENSION (ML) NASAL DAILY
Qty: 1 BOTTLE | Refills: 3 | Status: SHIPPED | OUTPATIENT
Start: 2017-07-05 | End: 2018-01-16

## 2017-07-05 NOTE — MR AVS SNAPSHOT
After Visit Summary   7/5/2017    Hécotr Quispe    MRN: [id number]           Patient Information     Date Of Birth          1950        Visit Information        Provider Department      7/5/2017 4:15 PM Eladio Hernandez MD [facility]        Today's Diagnoses     Sinusitis, unspecified chronicity, unspecified location    -  1      Care Instructions      - take doxycycline 1 tablet twice a day for 7 days  - start flonase 1-2 sprays in each nostril daily for 1 month  - limit afrin to 3 days  - get an over the counter probiotic to take while taking antibiotics then stop  - return to clinic in 2 weeks, if you're not better  - sooner if you have fever, difficulty breathing, chest pain, bloody diarrhea, and for other reasons to call your doctor, see below  Sinusitis (Antibiotic Treatment)    The sinuses are air-filled spaces within the bones of the face. They connect to the inside of the nose. Sinusitis is an inflammation of the tissue lining the sinus cavity. Sinus inflammation can occur during a cold. It can also be due to allergies to pollens and other particles in the air. Sinusitis can cause symptoms of sinus congestion and fullness. A sinus infection causes fever, headache and facial pain. There is often green or yellow drainage from the nose or into the back of the throat (post-nasal drip). You have been given antibiotics to treat this condition.  Home care:    Take the full course of antibiotics as instructed. Do not stop taking them, even if you feel better.    Drink plenty of water, hot tea, and other liquids. This may help thin mucus. It also may promote sinus drainage.    Heat may help soothe painful areas of the face. Use a towel soaked in hot water. Or,  the shower and direct the hot spray onto your face. Using a vaporizer along with a menthol rub at night may also help.     An expectorant containing guaifenesin may help thin the mucus and promote drainage from the  sinuses.    Over-the-counter decongestants may be used unless a similar medicine was prescribed. Nasal sprays work the fastest. Use one that contains phenylephrine or oxymetazoline. First blow the nose gently. Then use the spray. Do not use these medicines more often than directed on the label or symptoms may get worse. You may also use tablets containing pseudoephedrine. Avoid products that combine ingredients, because side effects may be increased. Read labels. You can also ask the pharmacist for help. (NOTE: Persons with high blood pressure should not use decongestants. They can raise blood pressure.)    Over-the-counter antihistamines may help if allergies contributed to your sinusitis.      Do not use nasal rinses or irrigation during an acute sinus infection, unless told to by your health care provider. Rinsing may spread the infection to other sinuses.    Use acetaminophen or ibuprofen to control pain, unless another pain medicine was prescribed. (If you have chronic liver or kidney disease or ever had a stomach ulcer, talk with your doctor before using these medicines. Aspirin should never be used in anyone under 18 years of age who is ill with a fever. It may cause severe liver damage.)    Don't smoke. This can worsen symptoms.  Follow-up care  Follow up with your healthcare provider or our staff if you are not improving within the next week.  When to seek medical advice  Call your healthcare provider if any of these occur:    Facial pain or headache becoming more severe    Stiff neck    Unusual drowsiness or confusion    Swelling of the forehead or eyelids    Vision problems, including blurred or double vision    Fever of 100.4 F (38 C) or higher, or as directed by your healthcare provider    Seizure    Breathing problems    Symptoms not resolving within 10 days  Date Last Reviewed: 4/13/2015 2000-2017 [employer]. [address] [place] [address], [place] [address]. All rights reserved. This  information is not intended as a substitute for professional medical care. Always follow your healthcare professional's instructions.                Follow-ups after your visit        Who to contact     If you have questions or need follow up information about today's clinic visit or your schedule please contact [facility] [place] directly at [phone].  Normal or non-critical lab and imaging results will be communicated to you by MyChart, letter or phone within 4 business days after the clinic has received the results. If you do not hear from us within 7 days, please contact the clinic through [employer] or phone. If you have a critical or abnormal lab result, we will notify you by phone as soon as possible.  Submit refill requests through [employer] or call your pharmacy and they will forward the refill request to us. Please allow 3 business days for your refill to be completed.          Additional Information About Your Visit        MyChart Information     [employer] gives you secure access to your electronic health record. If you see a primary care provider, you can also send messages to your care team and make appointments. If you have questions, please call your primary care clinic.  If you do not have a primary care provider, please call [phone] and they will assist you.        Care EveryWhere ID     This is your Care EveryWhere ID. This could be used by other organizations to access your [facility] medical records  [id number]        Your Vitals Were     Pulse Temperature Respirations Pulse Oximetry BMI (Body Mass Index)       79 98.8  F (37.1  C) (Oral) 18 96% 36.92 kg/m2        Blood Pressure from Last 3 Encounters:   07/05/17 130/60   06/27/17 141/76   02/23/17 146/72    Weight from Last 3 Encounters:   07/05/17 250 lb (113.4 kg)   06/27/17 250 lb (113.4 kg)   02/23/17 258 lb 9.6 oz (117.3 kg)              Today, you had the following     No orders found for display         Today's Medication Changes           These changes are accurate as of: 7/5/17  5:18 PM.  If you have any questions, ask your nurse or doctor.               Start taking these medicines.        Dose/Directions    doxycycline Monohydrate 100 MG Tabs   Used for:  Sinusitis, unspecified chronicity, unspecified location   Started by:  Eladio Hernandez MD        Dose:  100 mg   Take 100 mg by mouth 2 times daily for 7 days   Quantity:  14 tablet   Refills:  0       fluticasone 50 MCG/ACT spray   Commonly known as:  FLONASE   Used for:  Sinusitis, unspecified chronicity, unspecified location   Started by:  Eladio Hernandez MD        Dose:  1-2 spray   Spray 1-2 sprays into both nostrils daily   Quantity:  1 Bottle   Refills:  3            Where to get your medicines      These medications were sent to [id number]  [place] - [address]  [address] [facility] [address]     Phone:  [phone]     doxycycline Monohydrate 100 MG Tabs    fluticasone 50 MCG/ACT spray                Primary Care Provider Office Phone # Fax #    Jesus Cherry [phone]       [facility] [address] [place] [address]        Equal Access to Services     [facility] AH: Hadii ileana ku hadasho Soronnali, waaxda luqadaha, qaybta kaalmada adeegyada, lit woodruff. So [facility] [phone].    ATENCIÓN: Si habla español, tiene a zapata disposición servicios gratuitos de asistencia lingüística. [facility] [phone].    We comply with applicable federal civil rights laws and [place] laws. We do not discriminate on the basis of race, color, national origin, age, disability sex, sexual orientation or gender identity.            Thank you!     Thank you for choosing [facility]  for your care. Our goal is always to provide you with excellent care. Hearing back from our patients is one way we can continue to improve our services. Please take a few minutes to complete the  written survey that you may receive in the mail after your visit with us. Thank you!             Your Updated Medication List - Protect others around you: Learn how to safely use, store and throw away your medicines at www.disposemymeds.org.          This list is accurate as of: 7/5/17  5:18 PM.  Always use your most recent med list.                   Brand Name Dispense Instructions for use Diagnosis    aspirin 81 MG tablet     90 tablet    Take 1 tablet (81 mg) by mouth daily    Type 2 diabetes mellitus with diabetic nephropathy (H)       atorvastatin 20 MG tablet    LIPITOR    90 tablet    Take 1 tablet (20 mg) by mouth daily    Hyperlipidemia with target LDL less than 70       doxycycline Monohydrate 100 MG Tabs     14 tablet    Take 100 mg by mouth 2 times daily for 7 days    Sinusitis, unspecified chronicity, unspecified location       fluticasone 50 MCG/ACT spray    FLONASE    1 Bottle    Spray 1-2 sprays into both nostrils daily    Sinusitis, unspecified chronicity, unspecified location       gemfibrozil 600 MG tablet    LOPID    180 tablet    Take 1 tablet (600 mg) by mouth 2 times daily    Hyperlipidemia with target LDL less than 70       glipiZIDE 5 MG 24 hr tablet    glipiZIDE XL    90 tablet    Take 1 tablet (5 mg) by mouth daily (with breakfast)    Type 2 diabetes mellitus with hyperglycemia, without long-term current use of insulin (H)       lisinopril 10 MG tablet    PRINIVIL/ZESTRIL    90 tablet    Take 1 tablet (10 mg) by mouth daily    Essential hypertension with goal blood pressure less than 140/90       metFORMIN 1000 MG tablet    GLUCOPHAGE    180 tablet    Take 1 tablet (1,000 mg) by mouth 2 times daily (with meals)    Type 2 diabetes mellitus with hyperglycemia, without long-term current use of insulin (H)       metoprolol 25 MG 24 hr tablet    TOPROL-XL    45 tablet    Take 0.5 tablets (12.5 mg) by mouth daily    Coronary artery disease involving native coronary artery of native heart  without angina pectoris       nitroGLYcerin 0.4 MG sublingual tablet    NITROSTAT    30 tablet    Place 1 tablet (0.4 mg) under the tongue every 5 minutes as needed for chest pain    Coronary artery disease involving native coronary artery of native heart without angina pectoris       omeprazole 20 MG tablet      Take 20 mg by mouth daily.        * order for DME     1 Units    Accu-check glucometer or per insurance coverage    Type 2 diabetes mellitus without complication (H)       * order for DME     3 Month    Accu-check glucometer test lancets, or per insurance coverage Testing once daily    Type 2 diabetes mellitus without complication (H)       * order for DME     3 Month    Accu-check glucometer test strips or per insurance coverage Testing once daily    Type 2 diabetes mellitus without complication (H)       pioglitazone 45 MG tablet    ACTOS    90 tablet    Take 1 tablet (45 mg) by mouth daily    Type 2 diabetes mellitus with hyperglycemia, without long-term current use of insulin (H)       sitagliptin 100 MG tablet    JANUVIA    90 tablet    Take 1 tablet (100 mg) by mouth daily    Type 2 diabetes mellitus with hyperglycemia, without long-term current use of insulin (H)       tamsulosin 0.4 MG capsule    FLOMAX    90 capsule    Take 1 capsule (0.4 mg) by mouth daily    Prostate cancer (H)       tiZANidine 4 MG tablet    ZANAFLEX    30 tablet    Take 1 tablet (4 mg) by mouth 3 times daily as needed for muscle spasms    Upper back pain on right side       * Notice:  This list has 3 medication(s) that are the same as other medications prescribed for you. Read the directions carefully, and ask your doctor or other care provider to review them with you.

## 2017-07-05 NOTE — NURSING NOTE
"Chief Complaint   Patient presents with     URI       Initial /60 (BP Location: Right arm, Patient Position: Chair, Cuff Size: Adult Large)  Pulse 79  Temp 98.8  F (37.1  C) (Oral)  Resp 18  Wt 250 lb (113.4 kg)  SpO2 96%  BMI 36.92 kg/m2 Estimated body mass index is 36.92 kg/(m^2) as calculated from the following:    Height as of 6/27/17: 5' 9\" (1.753 m).    Weight as of this encounter: 250 lb (113.4 kg).  Medication Reconciliation: complete.Arnaldo FAUSTIN MA      "

## 2017-07-05 NOTE — PROGRESS NOTES
"  SUBJECTIVE:                                                    Héctor Quispe is a 67 year old male who presents to clinic today for the following health issues:    Congestion and Cough     Patient reports cold like symptoms for 7-10 days. Started with rhinorrhea. Over the last week he has also developed headache, congestion, sore throat, cough productive of green sputum, and one episode of non bloody diarrhea this morning. Over the last few days he has had some wheezing at night, and some dyspnea in bed 2/2 to \"junk\" in his chest. He denies PND, orthopnea. He denies fevers, chills, itchy, watery eyes, otalgia, tooth pain, chest pain, dyspnea, abd pain, N/V, dysuria, edema, rash. He recently was on vacation in [place], but his symptoms developed prior to this. No known sick contacts. Of note, he did develop epistaxis during the course of this illness, requiring ER evaluation, and is s/p cauterization. No recurrence since. He has been on Afrin. In addition, he has been on nyquil for his other symptoms. He has a deviated septum and usually gets a sinus infection once a year. He has no known seasonal allergies.     Murmur  Noted on exam. Per patient, has had murmur since childhood. Denies syncopal symptoms, angina, dyspnea on exertion.     Problem list and histories reviewed & adjusted, as indicated.  Additional history: as documented    Patient Active Problem List   Diagnosis     Prostate cancer (H)     Colon polyps     Coronary artery disease involving native coronary artery of native heart without angina pectoris     Hyperlipidemia with target LDL less than 70     Essential hypertension with goal blood pressure less than 140/90     Type 2 diabetes mellitus with hyperglycemia, without long-term current use of insulin (H)     Past Surgical History:   Procedure Laterality Date     ENT SURGERY       prostate       STENT, CORONARY, LUIS M      h/o coronary stent x 2       Social History   Substance Use Topics     " Smoking status: Former Smoker     Types: Cigarettes     Quit date: 8/2/2002     Smokeless tobacco: Never Used     Alcohol use 1.8 oz/week     3 Cans of beer per week     Family History   Problem Relation Age of Onset     Alzheimer Disease Mother      CANCER Father          Current Outpatient Prescriptions   Medication Sig Dispense Refill     doxycycline Monohydrate 100 MG TABS Take 100 mg by mouth 2 times daily for 7 days 14 tablet 0     fluticasone (FLONASE) 50 MCG/ACT spray Spray 1-2 sprays into both nostrils daily 1 Bottle 3     pioglitazone (ACTOS) 45 MG tablet Take 1 tablet (45 mg) by mouth daily 90 tablet 0     glipiZIDE (GLIPIZIDE XL) 5 MG 24 hr tablet Take 1 tablet (5 mg) by mouth daily (with breakfast) 90 tablet 1     metFORMIN (GLUCOPHAGE) 1000 MG tablet Take 1 tablet (1,000 mg) by mouth 2 times daily (with meals) 180 tablet 1     sitagliptin (JANUVIA) 100 MG tablet Take 1 tablet (100 mg) by mouth daily 90 tablet 1     nitroglycerin (NITROSTAT) 0.4 MG SL tablet Place 1 tablet (0.4 mg) under the tongue every 5 minutes as needed for chest pain 30 tablet 6     lisinopril (PRINIVIL,ZESTRIL) 10 MG tablet Take 1 tablet (10 mg) by mouth daily 90 tablet 3     metoprolol (TOPROL-XL) 25 MG 24 hr tablet Take 0.5 tablets (12.5 mg) by mouth daily 45 tablet 3     tamsulosin (FLOMAX) 0.4 MG 24 hr capsule Take 1 capsule (0.4 mg) by mouth daily 90 capsule 3     gemfibrozil (LOPID) 600 MG tablet Take 1 tablet (600 mg) by mouth 2 times daily 180 tablet 3     atorvastatin (LIPITOR) 20 MG tablet Take 1 tablet (20 mg) by mouth daily 90 tablet 3     tiZANidine (ZANAFLEX) 4 MG tablet Take 1 tablet (4 mg) by mouth 3 times daily as needed for muscle spasms 30 tablet 0     aspirin 81 MG tablet Take 1 tablet (81 mg) by mouth daily 90 tablet 11     order for DME Accu-check glucometer or per insurance coverage 1 Units 0     order for DME Accu-check glucometer test lancets, or per insurance coverage  Testing once daily 3 Month 3      order for DME Accu-check glucometer test strips or per insurance coverage  Testing once daily 3 Month 3     omeprazole 20 MG tablet Take 20 mg by mouth daily.       No Known Allergies  Labs reviewed in EPIC    Reviewed and updated as needed this visit by clinical staff and provider and staff  Tobacco  Allergies  Med Hx  Surg Hx  Fam Hx  Soc Hx        ROS:  Constitutional, HEENT, cardiovascular, pulmonary, gi and gu systems are negative, except as otherwise noted.    OBJECTIVE:     /60 (BP Location: Right arm, Patient Position: Chair, Cuff Size: Adult Large)  Pulse 79  Temp 98.8  F (37.1  C) (Oral)  Resp 18  Wt 250 lb (113.4 kg)  SpO2 96%  BMI 36.92 kg/m2  Body mass index is 36.92 kg/(m^2).  GENERAL: healthy, alert and no distress  EYES: Eyes grossly normal to inspection, conjunctivae and sclerae normal  HENT: ear canals and TM's normal, nose and mouth without ulcers or lesions. Sinuses non tender to palpation.   NECK: supple, no adenopathy  RESP: lungs clear to auscultation - no rales, rhonchi or wheezes  CV: regular rate and rhythm, normal S1 S2, no S3 or S4, 3/6 DEBBIE at LUSB, no peripheral edema and peripheral pulses strong  ABDOMEN: soft, nontender, and bowel sounds normal  MS: no gross musculoskeletal defects noted  SKIN: no suspicious lesions or rashes  NEURO: Normal strength and tone, mentation intact and speech normal  PSYCH: mentation appears normal, affect normal/bright    Diagnostic Test Results:  none     ASSESSMENT/PLAN:     (J32.9) Sinusitis, unspecified chronicity, unspecified location  (primary encounter diagnosis)  Comment: Differential includes viral vs bacterial URI (sinusitis vs bronchitis), seasonal allergies, less likely PNA or CHF. Given duration of symptoms and co-morbidities will treat for potential bacterial URI.   Plan:   - doxycycline 100 mg BID x 7 days  - start flonase 1-2 sprays in each nostril x 1 month  - limit afrin to 3 days    (R01.1) Heart murmur  Comment: Hx of  heart disease including HTN, MI. Known murmur since childhood. Currently asymptomatic.   Plan:   - continue to monitor  - if becomes symptomatic or murmur becomes louder, consider ECHO    Symptoms to seek immediate medical care reviewed with patient  For additional instructions, see AVS    Pt d/w Dr. Perez who agrees with plan     Corby Hernandez MD  PGY3 Med [facility] [place]    I have discussed this patient with Dr. Hernandez and agree with joint documentation and plan as noted above.    Jose Alejandro Perez MD  Attending Internal Medicine/Pediatrics Physician

## 2017-07-05 NOTE — PATIENT INSTRUCTIONS
- take doxycycline 1 tablet twice a day for 7 days  - start flonase 1-2 sprays in each nostril daily for 1 month  - limit afrin to 3 days  - get an over the counter probiotic to take while taking antibiotics then stop  - return to clinic in 2 weeks, if you're not better  - sooner if you have fever, difficulty breathing, chest pain, bloody diarrhea, and for other reasons to call your doctor, see below  Sinusitis (Antibiotic Treatment)    The sinuses are air-filled spaces within the bones of the face. They connect to the inside of the nose. Sinusitis is an inflammation of the tissue lining the sinus cavity. Sinus inflammation can occur during a cold. It can also be due to allergies to pollens and other particles in the air. Sinusitis can cause symptoms of sinus congestion and fullness. A sinus infection causes fever, headache and facial pain. There is often green or yellow drainage from the nose or into the back of the throat (post-nasal drip). You have been given antibiotics to treat this condition.  Home care:    Take the full course of antibiotics as instructed. Do not stop taking them, even if you feel better.    Drink plenty of water, hot tea, and other liquids. This may help thin mucus. It also may promote sinus drainage.    Heat may help soothe painful areas of the face. Use a towel soaked in hot water. Or,  the shower and direct the hot spray onto your face. Using a vaporizer along with a menthol rub at night may also help.     An expectorant containing guaifenesin may help thin the mucus and promote drainage from the sinuses.    Over-the-counter decongestants may be used unless a similar medicine was prescribed. Nasal sprays work the fastest. Use one that contains phenylephrine or oxymetazoline. First blow the nose gently. Then use the spray. Do not use these medicines more often than directed on the label or symptoms may get worse. You may also use tablets containing pseudoephedrine. Avoid products  that combine ingredients, because side effects may be increased. Read labels. You can also ask the pharmacist for help. (NOTE: Persons with high blood pressure should not use decongestants. They can raise blood pressure.)    Over-the-counter antihistamines may help if allergies contributed to your sinusitis.      Do not use nasal rinses or irrigation during an acute sinus infection, unless told to by your health care provider. Rinsing may spread the infection to other sinuses.    Use acetaminophen or ibuprofen to control pain, unless another pain medicine was prescribed. (If you have chronic liver or kidney disease or ever had a stomach ulcer, talk with your doctor before using these medicines. Aspirin should never be used in anyone under 18 years of age who is ill with a fever. It may cause severe liver damage.)    Don't smoke. This can worsen symptoms.  Follow-up care  Follow up with your healthcare provider or our staff if you are not improving within the next week.  When to seek medical advice  Call your healthcare provider if any of these occur:    Facial pain or headache becoming more severe    Stiff neck    Unusual drowsiness or confusion    Swelling of the forehead or eyelids    Vision problems, including blurred or double vision    Fever of 100.4 F (38 C) or higher, or as directed by your healthcare provider    Seizure    Breathing problems    Symptoms not resolving within 10 days  Date Last Reviewed: 4/13/2015 2000-2017 [employer]. [address] [place] [address], [place] [address]. All rights reserved. This information is not intended as a substitute for professional medical care. Always follow your healthcare professional's instructions.

## 2017-07-21 ENCOUNTER — OFFICE VISIT (OUTPATIENT)
Dept: FAMILY MEDICINE | Facility: CLINIC | Age: 67
End: 2017-07-21
Payer: COMMERCIAL

## 2017-07-21 VITALS
RESPIRATION RATE: 18 BRPM | HEART RATE: 64 BPM | HEIGHT: 69 IN | WEIGHT: 247 LBS | BODY MASS INDEX: 36.58 KG/M2 | SYSTOLIC BLOOD PRESSURE: 132 MMHG | OXYGEN SATURATION: 99 % | TEMPERATURE: 97.4 F | DIASTOLIC BLOOD PRESSURE: 80 MMHG

## 2017-07-21 DIAGNOSIS — R80.9 MICROALBUMINURIA: ICD-10-CM

## 2017-07-21 DIAGNOSIS — R80.9 TYPE 1 DIABETES MELLITUS WITH MICROALBUMINURIA (H): ICD-10-CM

## 2017-07-21 DIAGNOSIS — C61 PROSTATE CANCER (H): ICD-10-CM

## 2017-07-21 DIAGNOSIS — I10 BENIGN ESSENTIAL HYPERTENSION: ICD-10-CM

## 2017-07-21 DIAGNOSIS — M25.511 CHRONIC RIGHT SHOULDER PAIN: Primary | ICD-10-CM

## 2017-07-21 DIAGNOSIS — Z87.891 PERSONAL HISTORY OF TOBACCO USE, PRESENTING HAZARDS TO HEALTH: ICD-10-CM

## 2017-07-21 DIAGNOSIS — E66.01 MORBID OBESITY DUE TO EXCESS CALORIES (H): ICD-10-CM

## 2017-07-21 DIAGNOSIS — G89.29 CHRONIC RIGHT SHOULDER PAIN: Primary | ICD-10-CM

## 2017-07-21 DIAGNOSIS — M54.2 NECK PAIN: ICD-10-CM

## 2017-07-21 DIAGNOSIS — I25.10 CORONARY ARTERY DISEASE INVOLVING NATIVE CORONARY ARTERY OF NATIVE HEART WITHOUT ANGINA PECTORIS: ICD-10-CM

## 2017-07-21 DIAGNOSIS — Z13.89 SCREENING FOR DIABETIC PERIPHERAL NEUROPATHY: ICD-10-CM

## 2017-07-21 DIAGNOSIS — Z12.5 SCREENING FOR PROSTATE CANCER: ICD-10-CM

## 2017-07-21 DIAGNOSIS — E78.00 HYPERCHOLESTEROLEMIA: ICD-10-CM

## 2017-07-21 DIAGNOSIS — E10.29 TYPE 1 DIABETES MELLITUS WITH MICROALBUMINURIA (H): ICD-10-CM

## 2017-07-21 PROBLEM — E11.65 TYPE 2 DIABETES MELLITUS WITH HYPERGLYCEMIA, WITHOUT LONG-TERM CURRENT USE OF INSULIN (H): Status: RESOLVED | Noted: 2017-02-23 | Resolved: 2017-07-21

## 2017-07-21 LAB
ALT SERPL W P-5'-P-CCNC: 20 U/L (ref 0–70)
HBA1C MFR BLD: 6.1 % (ref 4.3–6)
LDLC SERPL DIRECT ASSAY-MCNC: 52 MG/DL
PSA SERPL-ACNC: 0.02 UG/L (ref 0–4)

## 2017-07-21 PROCEDURE — 99214 OFFICE O/P EST MOD 30 MIN: CPT | Performed by: FAMILY MEDICINE

## 2017-07-21 PROCEDURE — 99207 C FOOT EXAM  NO CHARGE: CPT | Performed by: FAMILY MEDICINE

## 2017-07-21 PROCEDURE — 84460 ALANINE AMINO (ALT) (SGPT): CPT | Performed by: FAMILY MEDICINE

## 2017-07-21 PROCEDURE — 83721 ASSAY OF BLOOD LIPOPROTEIN: CPT | Performed by: FAMILY MEDICINE

## 2017-07-21 PROCEDURE — G0103 PSA SCREENING: HCPCS | Performed by: FAMILY MEDICINE

## 2017-07-21 PROCEDURE — 83036 HEMOGLOBIN GLYCOSYLATED A1C: CPT | Performed by: FAMILY MEDICINE

## 2017-07-21 PROCEDURE — 36415 COLL VENOUS BLD VENIPUNCTURE: CPT | Performed by: FAMILY MEDICINE

## 2017-07-21 NOTE — PROGRESS NOTES
SUBJECTIVE:                                                    Héctor Quispe is a 67 year old male who presents to clinic today for the following health issues:    Musculoskeletal problem/pain:Rt Shoulder -nondominant Pain/Strain  With Rt sided Neck Pain       Duration: 07/17/17    Onset 2013 post driving to [place] in 2 days     3-4 x since 1st time     Lasts a wk or so      2 steroid injections -helped for a yr or so     No PT     Description  Location:above     Intensity:  moderate    Accompanying signs and symptoms: stiffness    History  Previous similar problem: no   Previous evaluation:  none    Precipitating or alleviating factors:  Trauma or overuse: no   Aggravating factors include: overuse    Therapies tried and outcome: heat, immobilization and NSAID's/No Relief    Rt Sided Neck Pain      Duration: recurrent since 2013    Onset post driving to [place] in 2013     Drives there every winter     Description:  Location: above  Radiation: into the right shoulder    Intensity:  moderate    Accompanying signs and symptoms: above    History (similar episodes/previous evaluation): None    Precipitating or alleviating factors: None    Therapies tried and outcome: None    MORBID OBESITY    -BMI= 37 but :  -comorbid CAD, DM, HTN, hi LDL   -is working at exercise     PROSTATE CA      Duration: RX / DR Cartagena / [facility]  In 2010 and no recurrence since     Description (location/character/radiation): above    Intensity:  Last PSA in 2015 = < .01    Accompanying signs and symptoms: 0    History (similar episodes/previous evaluation): None    Precipitating or alleviating factors: None    Therapies tried and outcome: None     TOBACCO ABUSE    -2ppd [age]y/o = 40 pk yr hx   -assymptomatic     Diabetes Follow-up    Patient is checking blood sugars: once daily.  Results are as follows:       am - <140        Diabetic concerns: None     Symptoms of hypoglycemia (low blood sugar): none     Paresthesias (numbness or burning  in feet) or sores: No   Date of last diabetic eye exam: ?2016    Hyperlipidemia:LDL Follow-Up      Rate your low fat/cholesterol diet?: good    Taking statin?  Yes, no muscle aches [id number] atorvastatin    Other lipid medications/supplements?:  none    Hypertension Follow-up      Outpatient blood pressures are not being checked.   Here < 140/80    Low Salt Diet: no added salt    Vascular Disease Follow-up:  Coronary Artery Disease (CAD)      Chest pain or pressure, left side neck or arm pain: No    Shortness of breath/increased sweats/nausea with exertion: No    Pain in calves walking 1-2 blocks: No    Worsened or new symptoms since last visit: No    Nitroglycerin use: yes and occasionally    Daily aspirin use: Yes    MI s/po stenting in 1997 & 2002               Problem list and histories reviewed & adjusted, as indicated.  Additional history: as documented    Labs reviewed in EPIC    Reviewed and updated as needed this visit by clinical staffTobacco  Allergies  Meds  Problems  Med Hx  Surg Hx  Fam Hx  Soc Hx        Reviewed and updated as needed this visit by Provider         ROS:  C: NEGATIVE for fever, chills, change in weight  I: NEGATIVE for worrisome rashes, moles or lesions  E: NEGATIVE for vision changes or irritation  E/M: NEGATIVE for ear, mouth and throat problems  R: NEGATIVE for significant cough or SOB  B: NEGATIVE for masses, tenderness or discharge  CV: NEGATIVE for chest pain, palpitations or peripheral edema  GI: NEGATIVE for nausea, abdominal pain, heartburn, or change in bowel habits  : NEGATIVE for frequency, dysuria, or hematuria  MUSCULOSKELETAL:POSITIVE  for , joint stiffness Rt post shoulder  and neck pain  N: NEGATIVE for weakness, dizziness or paresthesias  E: NEGATIVE for temperature intolerance, skin/hair changes  H: NEGATIVE for bleeding problems  P: NEGATIVE for changes in mood or affect    OBJECTIVE:     /80  Pulse 64  Temp 97.4  F (36.3  C) (Tympanic)  Resp 18   "Ht 5' 9\" (1.753 m)  Wt 247 lb (112 kg)  SpO2 99%  BMI 36.48 kg/m2  Body mass index is 36.48 kg/(m^2).  GENERAL: healthy, alert, no distress and obese  EYES: Eyes grossly normal to inspection, PERRL and conjunctivae and sclerae normal  NECK: no adenopathy, no asymmetry, masses, or scars and thyroid normal to palpation  RESP: lungs clear to auscultation - no rales, rhonchi or wheezes  CV: regular rate and rhythm, normal S1 S2, no S3 or S4, no murmur, click or rub, no peripheral edema and peripheral pulses strong  MS: no gross musculoskeletal defects noted, no edema  Diabetic Foot Exam: No sores, ulcers, erthematous pressure areas; good DP& PT pulses and normal capillary filling time ;normal sensation to monofilament  testing   SKIN: no suspicious lesions or rashes  NEURO: Normal strength and tone, mentation intact and speech normal  PSYCH: mentation appears normal, affect normal/bright    Diagnostic Test Results:  none     ASSESSMENT/PLAN:               ICD-10-CM    1. Chronic right shoulder pain- Rt romboids- recurrent since 2013 M25.511 ORTHOPEDICS ADULT REFERRAL    G89.29 [facility]   2. Neck pain-Rt sided recurrent since 2013  M54.2 ORTHOPEDICS ADULT REFERRAL     [facility]   3. Hypercholesterolemia E78.00 ALT     LDL cholesterol direct   4. Benign essential hypertension I10    5. Coronary artery disease involving native coronary artery of native heart without angina pectoris I25.10    6. Type 1 diabetes mellitus with microalbuminuria (H) E10.29 Hemoglobin A1c    R80.9    7. Microalbuminuria R80.9    8. Morbid obesity due to excess calories (H) :BMI =37 w comorbid HTN, DM , hi LDL, CAD E66.01 ALT   9. Personal history of tobacco use, presenting hazards to health:[age]y/o @ 2ppd=38 pk yr hx Z87.891    10. Prostate cancer (H):[facility] 2010 C61    11. Screening for diabetic peripheral neuropathy Z13.89 FOOT EXAM  NO CHARGE [38093.114]   12. Screening for " prostate cancer Z12.5 Prostate spec antigen screen       Patient Instructions   1. ICE  decreases inflammation & kills the pain coming from the nerves     WARM SOAKS/PACKS  Relax & loosen up tight muscles to reduce the pain of the        muscle tightness & spasm    2. , Weight Loss Tips  1. Do not eat after 6 hrs before your expected bedtime  2. Have your heaviest meal for breakfast, a slightly lighter meal at lunch and a snack 6 hrs before bed  3. No sugar/calorie drinks except milk ie no fruit juice, pop, alcohol.  4. Drink milk 30min before meals to decrease your hunger. Also it is excellent as part of your last meal of the day snack  5. Drink lots of water  6. Increase fiber in diet: all bran cereal, salads, popcorn etc  7. Have only one small serving of fruit a day about 1/2 cup (as this is high in sugar)  8. EXERCISE is the bottom line. Without it, you will gain weight even on a low calorie diet. Best if done 2-3X a day as can    Being overweight contributes to high blood pressure and high cholesterol, both of which cause heart attacks, strokes and kidney failure, prediabetes and diabetes, arthritis, and liver disease     2. No difference was  Noted by patients in a double blind study when given codeine, tylenol ( acetaminophen) or ibuprofen (all in identical pills). They felt no difference in pain relief. Since ibuprofen and the NSAIDs  causes kidney damage, esophageal damage with heartburn, and can increase the risk of esophageal and stomach cancer and ulcers,and colonic strictures. They also cause increased risk of heart attack .   I recommend that you use tylenol(acetaminophen) for pain. Use the acetaminophen ES  Which has 500mgm/tablet You can take up to 2 tablets 4 times a day as need for pain.  If this is not enough, you can add in ibuprofen or aleve(naprosyn) with 2 glasses of fluid and some food-to protect the stomach and esophagus. Please let us know if you are continuing to take ibuprofen or aleve,  as we will need to periodically check your kidney function with a blood test.          Kaye Aguiar MD  [facility]    Weight management plan: Discussed healthy diet and exercise guidelines and patient will follow up in 6 months in clinic to re-evaluate.    Kaye Aguiar MD

## 2017-07-21 NOTE — PATIENT INSTRUCTIONS
1. ICE  decreases inflammation & kills the pain coming from the nerves     WARM SOAKS/PACKS  Relax & loosen up tight muscles to reduce the pain of the        muscle tightness & spasm    2. , Weight Loss Tips  1. Do not eat after 6 hrs before your expected bedtime  2. Have your heaviest meal for breakfast, a slightly lighter meal at lunch and a snack 6 hrs before bed  3. No sugar/calorie drinks except milk ie no fruit juice, pop, alcohol.  4. Drink milk 30min before meals to decrease your hunger. Also it is excellent as part of your last meal of the day snack  5. Drink lots of water  6. Increase fiber in diet: all bran cereal, salads, popcorn etc  7. Have only one small serving of fruit a day about 1/2 cup (as this is high in sugar)  8. EXERCISE is the bottom line. Without it, you will gain weight even on a low calorie diet. Best if done 2-3X a day as can    Being overweight contributes to high blood pressure and high cholesterol, both of which cause heart attacks, strokes and kidney failure, prediabetes and diabetes, arthritis, and liver disease     2. No difference was  Noted by patients in a double blind study when given codeine, tylenol ( acetaminophen) or ibuprofen (all in identical pills). They felt no difference in pain relief. Since ibuprofen and the NSAIDs  causes kidney damage, esophageal damage with heartburn, and can increase the risk of esophageal and stomach cancer and ulcers,and colonic strictures. They also cause increased risk of heart attack .   I recommend that you use tylenol(acetaminophen) for pain. Use the acetaminophen ES  Which has 500mgm/tablet You can take up to 2 tablets 4 times a day as need for pain.  If this is not enough, you can add in ibuprofen or aleve(naprosyn) with 2 glasses of fluid and some food-to protect the stomach and esophagus. Please let us know if you are continuing to take ibuprofen or aleve, as we will need to periodically check your kidney function with a blood  test.

## 2017-07-21 NOTE — NURSING NOTE
"Chief Complaint   Patient presents with     Musculoskeletal Problem     /80  Pulse 64  Temp 97.4  F (36.3  C) (Tympanic)  Resp 18  Ht 5' 9\" (1.753 m)  Wt 247 lb (112 kg)  SpO2 99%  BMI 36.48 kg/m2 Estimated body mass index is 36.48 kg/(m^2) as calculated from the following:    Height as of this encounter: 5' 9\" (1.753 m).    Weight as of this encounter: 247 lb (112 kg).  BP completed using cuff size: solitario Pitt CMA    Health Maintenance Due   Topic Date Due     ADVANCE DIRECTIVE PLANNING Q5 YRS  06/20/2003     FOOT EXAM Q1 YEAR  11/25/2015     FALL RISK ASSESSMENT  01/06/2017     Health Maintenance reviewed at today's visit patient asked to schedule/complete:   None, Health Maintenance up to date.    "

## 2017-07-21 NOTE — LETTER
Héctor Quispe  [address] [place] [address]        July 24, 2017          Dear CalvinJose Enrique,    We are writing to inform you of your test results.    They are all normal     THE FOLLOWING ARE EXPLANATIONS OF SOME OF OUR LAB TESTS     YOU DID NOT NECESSARILY HAVE ALL OF THESE DONE     Hgb is the blood iron level   WBC means White Blood Cells   Platelets are small blood cells that help with forming the blood clots along with other blood factors.   Electrolytes are Sodium, Potassium, Calcium, Magnesium, Phosphorus.   Liver tests are: AST, ALT, Bilirubin, Alkaline Phosphatase.   Kidney tests are Creatinine, GFR.   HDL Cholesterol - is the good cholesterol and it is good to have it high.   LDL cholesterol is the bad cholesterol and it is good to have it low.   It is recommended to have LDL less than 130 for people with hypertension and to have it less than 100 for people with heart disease, diabetes and chronic kidney disease.   Triglycerides are another type of lipid that can cause heart disease, like the cholesterol and should be kept low   Thyroid tests are TSH, T4, T3   Glucose is sugar.   A1c is a test that gives us an idea about how well was controlled the diabetes for the last 3 months.   PSA stands for Prostate Specific Antigen and it can be elevated with prostate cancer or prostate inflammation.     Please continue on the same medications     Resulted Orders   ALT   Result Value Ref Range    ALT 20 0 - 70 U/L   LDL cholesterol direct   Result Value Ref Range    LDL Cholesterol Direct 52 <100 mg/dL      Comment:      Desirable:       <100 mg/dl   Hemoglobin A1c   Result Value Ref Range    Hemoglobin A1C 6.1 (H) 4.3 - 6.0 %   Prostate spec antigen screen   Result Value Ref Range    PSA 0.02 0 - 4 ug/L      Comment:      Assay Method:  Chemiluminescence using Siemens Vista analyzer       Thank you very much for choosing [facility]. Please call my office if you have any  questions or concerns.       Sincerely,        Kaye Aguiar MD

## 2017-07-21 NOTE — MR AVS SNAPSHOT
After Visit Summary   7/21/2017    Héctor Quispe    MRN: [id number]           Patient Information     Date Of Birth          1950        Visit Information        Provider Department      7/21/2017 8:40 AM Kaye Aguiar MD [facility]        Today's Diagnoses     Chronic right shoulder pain- Rt romboids- recurrent since 2013    -  1    Neck pain-Rt sided recurrent since 2013         Hypercholesterolemia        Benign essential hypertension        Coronary artery disease involving native coronary artery of native heart without angina pectoris        Type 1 diabetes mellitus with microalbuminuria (H)        Microalbuminuria        Morbid obesity due to excess calories (H) :BMI =37 w comorbid HTN, DM , hi LDL, CAD        Personal history of tobacco use, presenting hazards to health:[age]y/o @ 2ppd=40 pk yr hx        Prostate cancer (H):[place] 2010        Screening for diabetic peripheral neuropathy        Screening for prostate cancer          Care Instructions    1. ICE  decreases inflammation & kills the pain coming from the nerves     WARM SOAKS/PACKS  Relax & loosen up tight muscles to reduce the pain of the        muscle tightness & spasm    2. , Weight Loss Tips  1. Do not eat after 6 hrs before your expected bedtime  2. Have your heaviest meal for breakfast, a slightly lighter meal at lunch and a snack 6 hrs before bed  3. No sugar/calorie drinks except milk ie no fruit juice, pop, alcohol.  4. Drink milk 30min before meals to decrease your hunger. Also it is excellent as part of your last meal of the day snack  5. Drink lots of water  6. Increase fiber in diet: all bran cereal, salads, popcorn etc  7. Have only one small serving of fruit a day about 1/2 cup (as this is high in sugar)  8. EXERCISE is the bottom line. Without it, you will gain weight even on a low calorie diet. Best if done 2-3X a day as can    Being overweight contributes to high  blood pressure and high cholesterol, both of which cause heart attacks, strokes and kidney failure, prediabetes and diabetes, arthritis, and liver disease     2. No difference was  Noted by patients in a double blind study when given codeine, tylenol ( acetaminophen) or ibuprofen (all in identical pills). They felt no difference in pain relief. Since ibuprofen and the NSAIDs  causes kidney damage, esophageal damage with heartburn, and can increase the risk of esophageal and stomach cancer and ulcers,and colonic strictures. They also cause increased risk of heart attack .   I recommend that you use tylenol(acetaminophen) for pain. Use the acetaminophen ES  Which has 500mgm/tablet You can take up to 2 tablets 4 times a day as need for pain.  If this is not enough, you can add in ibuprofen or aleve(naprosyn) with 2 glasses of fluid and some food-to protect the stomach and esophagus. Please let us know if you are continuing to take ibuprofen or aleve, as we will need to periodically check your kidney function with a blood test.              Follow-ups after your visit        Additional Services     [facility] PT, HAND, AND CHIROPRACTIC REFERRAL       **This order will print in the [facility] Scheduling Office**    Physical Therapy, Hand Therapy and Chiropractic Care are available through:    *[facility]  *[facility]  *[facility]    Call one number to schedule at any of the above locations: [phone].    Your provider has referred you to: As Indicated:     Indication/Reason for Referral:   Onset of Illness:   Therapy Orders: Evaluate and Treat  Special Programs:   Special Request:     Lexy Pugh      Additional Comments for the Therapist or Chiropractor:     Please be aware that coverage of these services is subject to the terms and limitations of your health insurance plan.  Call member services at your health plan with any benefit or coverage questions.      Please  bring the following to your appointment:    *Your personal calendar for scheduling future appointments  *Comfortable clothing            ORTHOPEDICS ADULT REFERRAL       Your provider has referred you to: [facility]: [facility] - [facility]  [phone]   [email or web address]    Please be aware that coverage of these services is subject to the terms and limitations of your health insurance plan.  Call member services at your health plan with any benefit or coverage questions.      Please bring the following to your appointment:    >>   Any x-rays, CTs or MRIs which have been performed.  Contact the facility where they were done to arrange for  prior to your scheduled appointment.    >>   List of current medications   >>   This referral request   >>   Any documents/labs given to you for this referral                  Follow-up notes from your care team     Return in about 6 months (around 1/21/2018) for Routine Visit.      Who to contact     If you have questions or need follow up information about today's clinic visit or your schedule please contact [facility] directly at [phone].  Normal or non-critical lab and imaging results will be communicated to you by MyChart, letter or phone within 4 business days after the clinic has received the results. If you do not hear from us within 7 days, please contact the clinic through [employer] or phone. If you have a critical or abnormal lab result, we will notify you by phone as soon as possible.  Submit refill requests through [employer] or call your pharmacy and they will forward the refill request to us. Please allow 3 business days for your refill to be completed.          Additional Information About Your Visit        [employer] Information     [employer] gives you secure access to your electronic health record. If you see a  "primary care provider, you can also send messages to your care team and make appointments. If you have questions, please call your primary care clinic.  If you do not have a primary care provider, please call [phone] and they will assist you.        Care EveryWhere ID     This is your Care EveryWhere ID. This could be used by other organizations to access your [place] medical records  [id number]        Your Vitals Were     Pulse Temperature Respirations Height Pulse Oximetry BMI (Body Mass Index)    64 97.4  F (36.3  C) (Tympanic) 18 5' 9\" (1.753 m) 99% 36.48 kg/m2       Blood Pressure from Last 3 Encounters:   07/21/17 132/80   07/05/17 130/60   06/27/17 141/76    Weight from Last 3 Encounters:   07/21/17 247 lb (112 kg)   07/05/17 250 lb (113.4 kg)   06/27/17 250 lb (113.4 kg)              We Performed the Following     ALT     FOOT EXAM  NO CHARGE [66031.114]     Hemoglobin A1c     [facility] PT, HAND, AND CHIROPRACTIC REFERRAL     LDL cholesterol direct     ORTHOPEDICS ADULT REFERRAL     Prostate spec antigen screen        Primary Care Provider Office Phone # Fax #    Jesus Tr Cherry [phone]       [facility] [address] [place] [address] [address] [place] [address]        Equal Access to Services     KRAIG VOSS AH: Hadii aad ku hadasho Soomaali, waaxda luqadaha, qaybta kaalmada adeegyada, waxay idiin hayaan adelucretia khthee laarcenio woodruff. So [facility] [phone].    ATENCIÓN: Si habla español, tiene a zapata disposición servicios gratuitos de asistencia lingüística. Llame al [phone].    We comply with applicable federal civil rights laws and [place] laws. We do not discriminate on the basis of race, color, national origin, age, disability sex, sexual orientation or gender identity.            Thank you!     Thank you for choosing [facility]  for your care. Our goal is always to provide you with excellent care. Hearing back from our patients is one way we can continue " to improve our services. Please take a few minutes to complete the written survey that you may receive in the mail after your visit with us. Thank you!             Your Updated Medication List - Protect others around you: Learn how to safely use, store and throw away your medicines at www.disposemymeds.org.          This list is accurate as of: 7/21/17  5:56 PM.  Always use your most recent med list.                   Brand Name Dispense Instructions for use Diagnosis    aspirin 81 MG tablet     90 tablet    Take 1 tablet (81 mg) by mouth daily    Type 2 diabetes mellitus with diabetic nephropathy (H)       atorvastatin 20 MG tablet    LIPITOR    90 tablet    Take 1 tablet (20 mg) by mouth daily    Hyperlipidemia with target LDL less than 70       fluticasone 50 MCG/ACT spray    FLONASE    1 Bottle    Spray 1-2 sprays into both nostrils daily    Sinusitis, unspecified chronicity, unspecified location       gemfibrozil 600 MG tablet    LOPID    180 tablet    Take 1 tablet (600 mg) by mouth 2 times daily    Hyperlipidemia with target LDL less than 70       glipiZIDE 5 MG 24 hr tablet    glipiZIDE XL    90 tablet    Take 1 tablet (5 mg) by mouth daily (with breakfast)    Type 2 diabetes mellitus with hyperglycemia, without long-term current use of insulin (H)       lisinopril 10 MG tablet    PRINIVIL/ZESTRIL    90 tablet    Take 1 tablet (10 mg) by mouth daily    Essential hypertension with goal blood pressure less than 140/90       metFORMIN 1000 MG tablet    GLUCOPHAGE    180 tablet    Take 1 tablet (1,000 mg) by mouth 2 times daily (with meals)    Type 2 diabetes mellitus with hyperglycemia, without long-term current use of insulin (H)       metoprolol 25 MG 24 hr tablet    TOPROL-XL    45 tablet    Take 0.5 tablets (12.5 mg) by mouth daily    Coronary artery disease involving native coronary artery of native heart without angina pectoris       nitroGLYcerin 0.4 MG sublingual tablet    NITROSTAT    30 tablet     Place 1 tablet (0.4 mg) under the tongue every 5 minutes as needed for chest pain    Coronary artery disease involving native coronary artery of native heart without angina pectoris       omeprazole 20 MG tablet      Take 20 mg by mouth daily.        * order for DME     1 Units    Accu-check glucometer or per insurance coverage    Type 2 diabetes mellitus without complication (H)       * order for DME     3 Month    Accu-check glucometer test lancets, or per insurance coverage Testing once daily    Type 2 diabetes mellitus without complication (H)       * order for DME     3 Month    Accu-check glucometer test strips or per insurance coverage Testing once daily    Type 2 diabetes mellitus without complication (H)       pioglitazone 45 MG tablet    ACTOS    90 tablet    Take 1 tablet (45 mg) by mouth daily    Type 2 diabetes mellitus with hyperglycemia, without long-term current use of insulin (H)       sitagliptin 100 MG tablet    JANUVIA    90 tablet    Take 1 tablet (100 mg) by mouth daily    Type 2 diabetes mellitus with hyperglycemia, without long-term current use of insulin (H)       tamsulosin 0.4 MG capsule    FLOMAX    90 capsule    Take 1 capsule (0.4 mg) by mouth daily    Prostate cancer (H)       tiZANidine 4 MG tablet    ZANAFLEX    30 tablet    Take 1 tablet (4 mg) by mouth 3 times daily as needed for muscle spasms    Upper back pain on right side       * Notice:  This list has 3 medication(s) that are the same as other medications prescribed for you. Read the directions carefully, and ask your doctor or other care provider to review them with you.

## 2017-08-09 DIAGNOSIS — E11.65 TYPE 2 DIABETES MELLITUS WITH HYPERGLYCEMIA, WITHOUT LONG-TERM CURRENT USE OF INSULIN (H): ICD-10-CM

## 2017-08-09 NOTE — TELEPHONE ENCOUNTER
glipiZIDE (GLIPIZIDE XL) 5 MG 24 hr tablet         Last Written Prescription Date: 3/16/2017  Last Fill Quantity: 90, # refills: 1  Last Office Visit with [facility], [facility] or [facility] prescribing provider:  7/21/2017        BP Readings from Last 3 Encounters:   07/21/17 132/80   07/05/17 130/60   06/27/17 141/76     Lab Results   Component Value Date    MICROL 266 02/28/2017     Lab Results   Component Value Date    UMALCR 186.01 02/28/2017     Creatinine   Date Value Ref Range Status   02/28/2017 0.90 0.66 - 1.25 mg/dL Final   ]  GFR Estimate   Date Value Ref Range Status   02/28/2017 84 >60 mL/min/1.7m2 Final     Comment:     Non  GFR Calc   01/06/2016 70 >60 mL/min/1.7m2 Final     Comment:     Non  GFR Calc   05/06/2015 75 >60 mL/min/1.7m2 Final     GFR Estimate If Black   Date Value Ref Range Status   02/28/2017 >90   GFR Calc   >60 mL/min/1.7m2 Final   01/06/2016 85 >60 mL/min/1.7m2 Final     Comment:      GFR Calc   05/06/2015 >90 >60 mL/min/1.7m2 Final     Lab Results   Component Value Date    CHOL 125 02/28/2017     Lab Results   Component Value Date    HDL 64 02/28/2017     Lab Results   Component Value Date    LDL 52 07/21/2017    LDL 49 02/28/2017     Lab Results   Component Value Date    TRIG 60 02/28/2017     Lab Results   Component Value Date    CHOLHDLRATIO 1.8 05/06/2015     Lab Results   Component Value Date    AST 13 02/28/2017     Lab Results   Component Value Date    ALT 20 07/21/2017     Lab Results   Component Value Date    A1C 6.1 07/21/2017    A1C 6.3 02/28/2017    A1C 6.2 09/13/2016    A1C 6.2 01/06/2016    A1C 7.2 05/06/2015     Potassium   Date Value Ref Range Status   02/28/2017 4.4 3.4 - 5.3 mmol/L Final

## 2017-08-10 RX ORDER — GLIPIZIDE 5 MG/1
TABLET, EXTENDED RELEASE ORAL
Qty: 90 TABLET | Refills: 1 | Status: SHIPPED | OUTPATIENT
Start: 2017-08-10 | End: 2018-01-16

## 2017-08-10 NOTE — TELEPHONE ENCOUNTER
Prescription approved per [facility] [place] Protocol.  Nayeli Chan, RN  Triage Nurse

## 2017-08-17 DIAGNOSIS — E11.65 TYPE 2 DIABETES MELLITUS WITH HYPERGLYCEMIA, WITHOUT LONG-TERM CURRENT USE OF INSULIN (H): ICD-10-CM

## 2017-08-17 NOTE — TELEPHONE ENCOUNTER
pioglitazone (ACTOS) 45 MG tablet         Last Written Prescription Date: 6/14/2017  Last Fill Quantity: 90, # refills: 0  Last Office Visit with G, P or [facility] prescribing provider:  7/21/2017        BP Readings from Last 3 Encounters:   07/21/17 132/80   07/05/17 130/60   06/27/17 141/76     Lab Results   Component Value Date    MICROL 266 02/28/2017     Lab Results   Component Value Date    UMALCR 186.01 02/28/2017     Creatinine   Date Value Ref Range Status   02/28/2017 0.90 0.66 - 1.25 mg/dL Final   ]  GFR Estimate   Date Value Ref Range Status   02/28/2017 84 >60 mL/min/1.7m2 Final     Comment:     Non  GFR Calc   01/06/2016 70 >60 mL/min/1.7m2 Final     Comment:     Non  GFR Calc   05/06/2015 75 >60 mL/min/1.7m2 Final     GFR Estimate If Black   Date Value Ref Range Status   02/28/2017 >90   GFR Calc   >60 mL/min/1.7m2 Final   01/06/2016 85 >60 mL/min/1.7m2 Final     Comment:      GFR Calc   05/06/2015 >90 >60 mL/min/1.7m2 Final     Lab Results   Component Value Date    CHOL 125 02/28/2017     Lab Results   Component Value Date    HDL 64 02/28/2017     Lab Results   Component Value Date    LDL 52 07/21/2017    LDL 49 02/28/2017     Lab Results   Component Value Date    TRIG 60 02/28/2017     Lab Results   Component Value Date    CHOLHDLRATIO 1.8 05/06/2015     Lab Results   Component Value Date    AST 13 02/28/2017     Lab Results   Component Value Date    ALT 20 07/21/2017     Lab Results   Component Value Date    A1C 6.1 07/21/2017    A1C 6.3 02/28/2017    A1C 6.2 09/13/2016    A1C 6.2 01/06/2016    A1C 7.2 05/06/2015     Potassium   Date Value Ref Range Status   02/28/2017 4.4 3.4 - 5.3 mmol/L Final

## 2017-08-22 RX ORDER — PIOGLITAZONEHYDROCHLORIDE 45 MG/1
TABLET ORAL
Qty: 90 TABLET | Refills: 1 | Status: SHIPPED | OUTPATIENT
Start: 2017-08-22 | End: 2018-01-08

## 2017-08-22 NOTE — TELEPHONE ENCOUNTER
Patient was continued on same medications.  Prescription approved per [facility] Refill Protocol.  Nayeli Chan, ZURI  Triage Nurse

## 2017-09-28 ENCOUNTER — TRANSFERRED RECORDS (OUTPATIENT)
Dept: HEALTH INFORMATION MANAGEMENT | Facility: CLINIC | Age: 67
End: 2017-09-28

## 2017-10-04 DIAGNOSIS — C61 PROSTATE CANCER (H): ICD-10-CM

## 2017-10-05 RX ORDER — TAMSULOSIN HYDROCHLORIDE 0.4 MG/1
CAPSULE ORAL
Qty: 90 CAPSULE | Refills: 3 | Status: SHIPPED | OUTPATIENT
Start: 2017-10-05 | End: 2018-10-16

## 2017-10-05 NOTE — TELEPHONE ENCOUNTER
tamsulosin (FLOMAX) 0.4 MG 24 hr capsule         Last Written Prescription Date: 09/13/2016  Last Fill Quantity: 90, # refills: 3    Last Office Visit with [facility], [facility] or [facility] prescribing provider:  07/05/2017   Future Office Visit:      BP Readings from Last 3 Encounters:   07/21/17 132/80   07/05/17 130/60   06/27/17 141/76

## 2017-10-05 NOTE — TELEPHONE ENCOUNTER
Routing to Dr Aguiar who saw patient in July.  Please sign if ok.  Nayeli Chan, ZURI  Triage Nurse

## 2017-11-02 DIAGNOSIS — E78.5 HYPERLIPIDEMIA WITH TARGET LDL LESS THAN 70: ICD-10-CM

## 2017-11-06 RX ORDER — GEMFIBROZIL 600 MG/1
TABLET, FILM COATED ORAL
Qty: 180 TABLET | Refills: 1 | Status: SHIPPED | OUTPATIENT
Start: 2017-11-06 | End: 2018-01-16

## 2017-11-06 NOTE — TELEPHONE ENCOUNTER
Requested Prescriptions   Pending Prescriptions Disp Refills     gemfibrozil (LOPID) 600 MG tablet [Pharmacy Med Name: GEMFIBROZIL 600 MG Tablet] 180 tablet 3     Sig: TAKE 1 TABLET TWICE DAILY    Fibrates Failed    11/3/2017  7:46 AM       Failed - Lipid panel on file in past 12 months    Recent Labs   Lab Test  07/21/17   0935  02/28/17   0831   05/06/15   0908   CHOL   --   125   < >  131   TRIG   --   60   < >  50   HDL   --   64   < >  71   LDL  52  49   < >  50   VLDL   --    --    --   10   CHOLHDLRATIO   --    --    --   1.8    < > = values in this interval not displayed.          Passed - No abnormal creatine kinase in past 12 months    No lab results found.         Passed - Recent or future visit with authorizing provider    Patient had office visit in the last year or has a visit in the next 30 days with authorizing provider.  See chart review.          Passed - Patient is age 18 or older        Prescription approved per [facility] Refill Protocol.    Nayeli Chan, RN  Triage Nurse

## 2017-11-15 DIAGNOSIS — I10 ESSENTIAL HYPERTENSION WITH GOAL BLOOD PRESSURE LESS THAN 140/90: ICD-10-CM

## 2017-11-20 RX ORDER — LISINOPRIL 10 MG/1
TABLET ORAL
Qty: 90 TABLET | Refills: 0 | Status: SHIPPED | OUTPATIENT
Start: 2017-11-20 | End: 2018-01-16

## 2017-11-20 NOTE — TELEPHONE ENCOUNTER
Requested Prescriptions   Pending Prescriptions Disp Refills     lisinopril (PRINIVIL/ZESTRIL) 10 MG tablet [Pharmacy Med Name: LISINOPRIL 10 MG Tablet] 90 tablet 3     Sig: TAKE 1 TABLET EVERY DAY    ACE Inhibitors (Including Combos) Protocol Passed    11/15/2017  8:18 PM       Passed - Blood pressure under 140/90    BP Readings from Last 3 Encounters:   07/21/17 132/80   07/05/17 130/60   06/27/17 141/76          Passed - Recent or future visit with authorizing provider's specialty    Patient had office visit in the last year or has a visit in the next 30 days with authorizing provider.  See chart review.          Passed - Patient is age 18 or older       Passed - Normal serum creatinine on file in past 12 months    Recent Labs   Lab Test  02/28/17   0831   CR  0.90            Passed - Normal serum potassium on file in past 12 months    Recent Labs   Lab Test  02/28/17   0831   POTASSIUM  4.4             Refilled x 1, patient is due for diabetic check January.  Nayeli Chan, RN  Triage Nurse

## 2017-11-29 DIAGNOSIS — E78.5 HYPERLIPIDEMIA WITH TARGET LDL LESS THAN 70: ICD-10-CM

## 2017-11-30 RX ORDER — ATORVASTATIN CALCIUM 20 MG/1
TABLET, FILM COATED ORAL
Qty: 90 TABLET | Refills: 1 | Status: SHIPPED | OUTPATIENT
Start: 2017-11-30 | End: 2018-01-16

## 2017-11-30 NOTE — TELEPHONE ENCOUNTER
Requested Prescriptions   Pending Prescriptions Disp Refills     atorvastatin (LIPITOR) 20 MG tablet [Pharmacy Med Name: ATORVASTATIN CALCIUM 20 MG Tablet] 90 tablet 3     Sig: TAKE 1 TABLET EVERY DAY    Statins Protocol Passed    11/29/2017  7:36 PM       Passed - LDL on file in past 12 months    Recent Labs   Lab Test  07/21/17   0935   LDL  52            Passed - No abnormal creatine kinase in past 12 months    No lab results found.         Passed - Recent or future visit with authorizing provider    Patient had office visit in the last year or has a visit in the next 30 days with authorizing provider.  See chart review.          Passed - Patient is age 18 or older        Prescription approved per [facility] Refill Protocol.  Nayeli Chan, RN  Triage Nurse

## 2017-12-04 DIAGNOSIS — E11.65 TYPE 2 DIABETES MELLITUS WITH HYPERGLYCEMIA, WITHOUT LONG-TERM CURRENT USE OF INSULIN (H): ICD-10-CM

## 2017-12-07 NOTE — TELEPHONE ENCOUNTER
Requested Prescriptions   Pending Prescriptions Disp Refills     sitagliptin (JANUVIA) 100 MG tablet 90 tablet 1     Sig: Take 1 tablet (100 mg) by mouth daily    DPP4 Inhibitors Protocol Passed    12/7/2017 10:46 AM       Passed - Blood pressure less than 140/90 in past 6 months    BP Readings from Last 3 Encounters:   07/21/17 132/80   07/05/17 130/60   06/27/17 141/76          Passed - LDL on file in past 12 months    Recent Labs   Lab Test  07/21/17   0935   LDL  52            Passed - Microalbumin on file in past 12 months    Recent Labs   Lab Test  02/28/17   0832   MICROL  266   UMALCR  186.01*            Passed - HgbA1C in past 3 or 6 months    Recent Labs   Lab Test  07/21/17   0935   A1C  6.1*            Passed - Patient is age 18 or older       Passed - Normal serum creatinine in past 12 months    Recent Labs   Lab Test  02/28/17   0831   CR  0.90            Passed - Recent visit with authorizing provider's specialty in past 6 months    IV to PO - Antibiotics     None                Due for diabetic check in January with labs.   Refilled x 1. Refill is printed and faxed to requested pharmacy.    Nayeli Chan, RN  Triage Nurse

## 2018-01-08 DIAGNOSIS — E11.65 TYPE 2 DIABETES MELLITUS WITH HYPERGLYCEMIA, WITHOUT LONG-TERM CURRENT USE OF INSULIN (H): ICD-10-CM

## 2018-01-08 NOTE — TELEPHONE ENCOUNTER
"Requested Prescriptions   Pending Prescriptions Disp Refills     pioglitazone (ACTOS) 45 MG tablet [Pharmacy Med Name: PIOGLITAZONE HCL 45 MG Tablet]  Last Written Prescription Date:  08/22/2017  Last Fill Quantity: 90 tablet,  # refills: 1   Last Office Visit with [facility], [facility] or [facility] prescribing provider:  07/21/2017   Future Office Visit:      90 tablet 1     Sig: TAKE 1 TABLET EVERY DAY    Thiazolidinedione Agents (TZD's)  Failed    1/8/2018  4:52 PM       Failed - Patient has had an appointment with authorizing provider within the past 6 mos.or within the next 30 days.    Patient had office visit in the last 6 months or has a visit in the next 30 days with authorizing provider.  See \"Patient Info\" tab in inbasket, or \"Choose Columns\" in Meds & Orders section of the refill encounter.          Passed - Patient's BP is less than 140/90    BP Readings from Last 3 Encounters:   07/21/17 132/80   07/05/17 130/60   06/27/17 141/76                Passed - Patient has documented LDL within the past 12 mos.    Recent Labs   Lab Test  07/21/17   0935   LDL  52            Passed - Patient has a normal ALT within the past 12 mos.    Recent Labs   Lab Test  07/21/17   0935   ALT  20            Passed - Patient has a normal AST within the past 12 mos.     Recent Labs   Lab Test  02/28/17   0831   AST  13            Passed - Patient has had a Microalbumin in the past 12 mos.    Recent Labs   Lab Test  02/28/17   0832   MICROL  266   UMALCR  186.01*            Passed - Patient has documented A1c within the specified period of time.    Recent Labs   Lab Test  07/21/17   0935   A1C  6.1*            Passed - Diagnosis not CHF       Passed - Patient is age 18 or older       Passed - Patient has a normal serum Creatinine in the past 12 months    Recent Labs   Lab Test  02/28/17   0831   CR  0.90               "

## 2018-01-11 RX ORDER — PIOGLITAZONEHYDROCHLORIDE 45 MG/1
TABLET ORAL
Qty: 90 TABLET | Refills: 0 | Status: SHIPPED | OUTPATIENT
Start: 2018-01-11 | End: 2018-01-16

## 2018-01-11 NOTE — TELEPHONE ENCOUNTER
Patient is due for a 6 month diabetic clinic visit and labs.  Refilled x 1. Please call patient to help schedule with Dr. Cherry. (ordering provider)  Nayeli Chan RN  Triage Nurse

## 2018-01-16 ENCOUNTER — TELEPHONE (OUTPATIENT)
Dept: PEDIATRICS | Facility: CLINIC | Age: 68
End: 2018-01-16

## 2018-01-16 ENCOUNTER — OFFICE VISIT (OUTPATIENT)
Dept: PEDIATRICS | Facility: CLINIC | Age: 68
End: 2018-01-16
Payer: COMMERCIAL

## 2018-01-16 VITALS
TEMPERATURE: 97.9 F | HEART RATE: 72 BPM | SYSTOLIC BLOOD PRESSURE: 130 MMHG | WEIGHT: 251 LBS | BODY MASS INDEX: 37.18 KG/M2 | HEIGHT: 69 IN | DIASTOLIC BLOOD PRESSURE: 60 MMHG

## 2018-01-16 DIAGNOSIS — E78.5 HYPERLIPIDEMIA WITH TARGET LDL LESS THAN 70: ICD-10-CM

## 2018-01-16 DIAGNOSIS — E11.65 TYPE 2 DIABETES MELLITUS WITH HYPERGLYCEMIA, WITHOUT LONG-TERM CURRENT USE OF INSULIN (H): Primary | ICD-10-CM

## 2018-01-16 DIAGNOSIS — I10 ESSENTIAL HYPERTENSION WITH GOAL BLOOD PRESSURE LESS THAN 140/90: ICD-10-CM

## 2018-01-16 DIAGNOSIS — I25.10 CORONARY ARTERY DISEASE INVOLVING NATIVE CORONARY ARTERY OF NATIVE HEART WITHOUT ANGINA PECTORIS: ICD-10-CM

## 2018-01-16 PROBLEM — E66.01 MORBID OBESITY DUE TO EXCESS CALORIES (H): Status: RESOLVED | Noted: 2017-07-21 | Resolved: 2018-01-16

## 2018-01-16 PROBLEM — E10.29 TYPE 1 DIABETES MELLITUS WITH MICROALBUMINURIA (H): Status: RESOLVED | Noted: 2017-07-21 | Resolved: 2018-01-16

## 2018-01-16 PROBLEM — M54.2 NECK PAIN: Status: RESOLVED | Noted: 2017-07-21 | Resolved: 2018-01-16

## 2018-01-16 PROBLEM — R80.9 TYPE 1 DIABETES MELLITUS WITH MICROALBUMINURIA (H): Status: RESOLVED | Noted: 2017-07-21 | Resolved: 2018-01-16

## 2018-01-16 PROBLEM — Z87.891 PERSONAL HISTORY OF TOBACCO USE, PRESENTING HAZARDS TO HEALTH: Status: RESOLVED | Noted: 2017-07-21 | Resolved: 2018-01-16

## 2018-01-16 PROBLEM — M48.02 CERVICAL SPINAL STENOSIS: Status: ACTIVE | Noted: 2018-01-16

## 2018-01-16 LAB
ALBUMIN SERPL-MCNC: 4 G/DL (ref 3.4–5)
ALP SERPL-CCNC: 54 U/L (ref 40–150)
ALT SERPL W P-5'-P-CCNC: 19 U/L (ref 0–70)
ANION GAP SERPL CALCULATED.3IONS-SCNC: 9 MMOL/L (ref 3–14)
AST SERPL W P-5'-P-CCNC: 17 U/L (ref 0–45)
BILIRUB SERPL-MCNC: 0.3 MG/DL (ref 0.2–1.3)
BUN SERPL-MCNC: 19 MG/DL (ref 7–30)
CALCIUM SERPL-MCNC: 8.8 MG/DL (ref 8.5–10.1)
CHLORIDE SERPL-SCNC: 106 MMOL/L (ref 94–109)
CHOLEST SERPL-MCNC: 128 MG/DL
CO2 SERPL-SCNC: 25 MMOL/L (ref 20–32)
CREAT SERPL-MCNC: 1.12 MG/DL (ref 0.66–1.25)
GFR SERPL CREATININE-BSD FRML MDRD: 65 ML/MIN/1.7M2
GLUCOSE SERPL-MCNC: 98 MG/DL (ref 70–99)
HBA1C MFR BLD: 6.3 % (ref 4.3–6)
HDLC SERPL-MCNC: 78 MG/DL
LDLC SERPL CALC-MCNC: 33 MG/DL
NONHDLC SERPL-MCNC: 50 MG/DL
POTASSIUM SERPL-SCNC: 4.5 MMOL/L (ref 3.4–5.3)
PROT SERPL-MCNC: 6.9 G/DL (ref 6.8–8.8)
SODIUM SERPL-SCNC: 140 MMOL/L (ref 133–144)
TRIGL SERPL-MCNC: 83 MG/DL

## 2018-01-16 PROCEDURE — 83036 HEMOGLOBIN GLYCOSYLATED A1C: CPT | Performed by: INTERNAL MEDICINE

## 2018-01-16 PROCEDURE — 36415 COLL VENOUS BLD VENIPUNCTURE: CPT | Performed by: INTERNAL MEDICINE

## 2018-01-16 PROCEDURE — 80061 LIPID PANEL: CPT | Performed by: INTERNAL MEDICINE

## 2018-01-16 PROCEDURE — 99214 OFFICE O/P EST MOD 30 MIN: CPT | Performed by: INTERNAL MEDICINE

## 2018-01-16 PROCEDURE — 80053 COMPREHEN METABOLIC PANEL: CPT | Performed by: INTERNAL MEDICINE

## 2018-01-16 RX ORDER — GEMFIBROZIL 600 MG/1
600 TABLET, FILM COATED ORAL 2 TIMES DAILY
Qty: 180 TABLET | Refills: 1 | Status: CANCELLED | OUTPATIENT
Start: 2018-01-16

## 2018-01-16 RX ORDER — GLIPIZIDE 5 MG/1
TABLET, FILM COATED, EXTENDED RELEASE ORAL
Qty: 90 TABLET | Refills: 1 | Status: CANCELLED | OUTPATIENT
Start: 2018-01-16

## 2018-01-16 RX ORDER — LISINOPRIL 10 MG/1
10 TABLET ORAL DAILY
Qty: 90 TABLET | Refills: 3 | Status: SHIPPED | OUTPATIENT
Start: 2018-01-16 | End: 2019-02-12

## 2018-01-16 RX ORDER — LIRAGLUTIDE 6 MG/ML
0.6 INJECTION SUBCUTANEOUS DAILY
Qty: 9 ML | Refills: 1 | Status: SHIPPED | OUTPATIENT
Start: 2018-01-16 | End: 2018-01-18

## 2018-01-16 RX ORDER — METOPROLOL SUCCINATE 25 MG/1
12.5 TABLET, EXTENDED RELEASE ORAL DAILY
Qty: 45 TABLET | Refills: 3 | Status: SHIPPED | OUTPATIENT
Start: 2018-01-16 | End: 2019-02-28

## 2018-01-16 RX ORDER — ATORVASTATIN CALCIUM 20 MG/1
20 TABLET, FILM COATED ORAL DAILY
Qty: 90 TABLET | Refills: 3 | Status: SHIPPED | OUTPATIENT
Start: 2018-01-16 | End: 2019-02-28

## 2018-01-16 RX ORDER — PIOGLITAZONEHYDROCHLORIDE 45 MG/1
45 TABLET ORAL DAILY
Qty: 90 TABLET | Refills: 1 | Status: SHIPPED | OUTPATIENT
Start: 2018-01-16 | End: 2018-01-18

## 2018-01-16 NOTE — PROGRESS NOTES
SUBJECTIVE:   Héctor Quispe is a 67 year old male who presents to clinic today for the following health issues:      Diabetes Follow-up      Patient is checking blood sugars: not at all    Diabetic concerns: None     Symptoms of hypoglycemia (low blood sugar): shaky     Paresthesias (numbness or burning in feet) or sores: No     Date of last diabetic eye exam: 7/17    Hyperlipidemia Follow-Up      Rate your low fat/cholesterol diet?: fair    Taking statin?  Yes, no muscle aches from statin    Other lipid medications/supplements?:  none    Hypertension Follow-up      Outpatient blood pressures are being checked at home.  Results are .    Low Salt Diet: low salt    BP Readings from Last 2 Encounters:   01/16/18 130/60   07/21/17 132/80     Hemoglobin A1C (%)   Date Value   01/16/2018 6.3 (H)   07/21/2017 6.1 (H)     LDL Cholesterol Calculated (mg/dL)   Date Value   02/28/2017 49   01/06/2016 63     LDL Cholesterol Direct (mg/dL)   Date Value   07/21/2017 52         Amount of exercise or physical activity: 2-3 days/week for an average of 30-45 minutes    Problems taking medications regularly: No    Medication side effects: none    Diet: low salt and low fat/cholesterol      Patient Active Problem List   Diagnosis     Colon polyps     Chronic right shoulder pain- recurrent since 2013     Cervical spinal stenosis     Type 2 diabetes mellitus with hyperglycemia, without long-term current use of insulin (H)     Essential hypertension with goal blood pressure less than 140/90     Hyperlipidemia with target LDL less than 70     Past Surgical History:   Procedure Laterality Date     ENT SURGERY       prostate       STENT, CORONARY, LUIS M      h/o coronary stent x 2       Social History   Substance Use Topics     Smoking status: Former Smoker     Types: Cigarettes     Quit date: 8/2/2002     Smokeless tobacco: Never Used     Alcohol use 1.8 oz/week     3 Cans of beer per week     Family History   Problem Relation Age of  Onset     Alzheimer Disease Mother      CANCER Father      DIABETES No family hx of      Coronary Artery Disease No family hx of      Hypertension No family hx of      Hyperlipidemia No family hx of      CEREBROVASCULAR DISEASE No family hx of      Breast Cancer No family hx of      Colon Cancer No family hx of      Prostate Cancer No family hx of      Other Cancer No family hx of      Depression No family hx of      Anxiety Disorder No family hx of      MENTAL ILLNESS No family hx of      Substance Abuse No family hx of      Anesthesia Reaction No family hx of      Asthma No family hx of      OSTEOPOROSIS No family hx of      Genetic Disorder No family hx of      Thyroid Disease No family hx of      Obesity No family hx of      Unknown/Adopted No family hx of          Current Outpatient Prescriptions   Medication Sig Dispense Refill     pioglitazone (ACTOS) 45 MG tablet Take 1 tablet (45 mg) by mouth daily 90 tablet 1     atorvastatin (LIPITOR) 20 MG tablet Take 1 tablet (20 mg) by mouth daily 90 tablet 3     lisinopril (PRINIVIL/ZESTRIL) 10 MG tablet Take 1 tablet (10 mg) by mouth daily 90 tablet 3     metFORMIN (GLUCOPHAGE) 1000 MG tablet Take 1 tablet (1,000 mg) by mouth 2 times daily (with meals) 180 tablet 1     metoprolol succinate (TOPROL-XL) 25 MG 24 hr tablet Take 0.5 tablets (12.5 mg) by mouth daily 45 tablet 3                     tamsulosin (FLOMAX) 0.4 MG capsule TAKE 1 CAPSULE EVERY DAY 90 capsule 3     nitroglycerin (NITROSTAT) 0.4 MG SL tablet Place 1 tablet (0.4 mg) under the tongue every 5 minutes as needed for chest pain 30 tablet 6     aspirin 81 MG tablet Take 1 tablet (81 mg) by mouth daily 90 tablet 11     order for DME Accu-check glucometer or per insurance coverage 1 Units 0     order for DME Accu-check glucometer test lancets, or per insurance coverage  Testing once daily 3 Month 3     order for DME Accu-check glucometer test strips or per insurance coverage  Testing once daily 3 Month 3  "    omeprazole 20 MG tablet Take 20 mg by mouth daily.     Gemfibrozil 600 BID  januvia 100mg daily  Glipizide 5mg QAM    ROS: The following systems have been completely reviewed and are negative except as noted in the HPI: CONSTITUTIONAL, CARDIOVASCULAR, PULMONARY    OBJECTIVE:                                                    /60 (Cuff Size: Adult Large)  Pulse 72  Temp 97.9  F (36.6  C) (Oral)  Ht 5' 9\" (1.753 m)  Wt 251 lb (113.9 kg)  BMI 37.07 kg/m2 Body mass index is 37.07 kg/(m^2).  GENERAL:  alert,  no distress  NECK: no tenderness, no adenopathy  RESP: lungs clear to auscultation - no rales, no rhonchi, no wheezes  CV: regular rates and rhythm, normal S1 S2, no S3 or S4 and no murmur, no click or rub   MS: extremities- no edema     ASSESSMENT/PLAN:                                                        ICD-10-CM    1. Type 2 diabetes mellitus with hyperglycemia, without long-term current use of insulin (H) E11.65 pioglitazone (ACTOS) 45 MG tablet     metFORMIN (GLUCOPHAGE) 1000 MG tablet     Hemoglobin A1c     liraglutide (VICTOZA) 18 MG/3ML soln     order for DME       Current regimen: metformin 1000 BID, glipizide 5 QD, januvia 100 QD, actos 45 QD       Pt states januvia is too expensive.  Plan: stop januvia.  Start victoza 0.6mg 2 weeks then increase to 1.2mg daily      D/c glipizide due to potential for hypoglycemia with victoza      Pt will meet with pharm D regarding victoza start      Pt will call in BG readings a few weeks after the change in regimen.   Next diabetes visit in April     2. Coronary artery disease involving native coronary artery of native heart without angina pectoris I25.10 metoprolol succinate (TOPROL-XL) 25 MG 24 hr tablet     Stable on current regimen     3. Essential hypertension with goal blood pressure less than 140/90 I10 lisinopril (PRINIVIL/ZESTRIL) 10 MG tablet     Well controlled     4. Hyperlipidemia with target LDL less than 70 E78.5 atorvastatin (LIPITOR) " 20 MG tablet     Lipid panel reflex to direct LDL Fasting     Comprehensive metabolic panel (BMP + Alb, Alk Phos, ALT, AST, Total. Bili, TP)      Gemfibrozil started for elevated triglycerides in setting of poorly controlled DM.   May not need gemfibrozil at this point/and potential for interaction with lipitor     D/c gemfibrozil and plan for rpt FLP next diabetes visit in April      Jesus Cherry MD  [facility]

## 2018-01-16 NOTE — NURSING NOTE
"Chief Complaint   Patient presents with     Diabetes       Initial /60 (Cuff Size: Adult Large)  Pulse 72  Temp 97.9  F (36.6  C) (Oral)  Ht 5' 9\" (1.753 m)  Wt 251 lb (113.9 kg)  BMI 37.07 kg/m2 Estimated body mass index is 37.07 kg/(m^2) as calculated from the following:    Height as of this encounter: 5' 9\" (1.753 m).    Weight as of this encounter: 251 lb (113.9 kg).  Medication Reconciliation: les Rowland CMA    "

## 2018-01-16 NOTE — MR AVS SNAPSHOT
After Visit Summary   1/16/2018    Héctor Quispe    MRN: [id number]           Patient Information     Date Of Birth          1950        Visit Information        Provider Department      1/16/2018 8:20 AM Jesus Cherry MD [facility]        Today's Diagnoses     Type 2 diabetes mellitus with hyperglycemia, without long-term current use of insulin (H)    -  1    Coronary artery disease involving native coronary artery of native heart without angina pectoris        Essential hypertension with goal blood pressure less than 140/90        Hyperlipidemia with target LDL less than 70        Cervical spinal stenosis          Care Instructions    INSTRUCTIONS FOR TODAY:     1)stop gemfibrozil.   Suspect triglycerides have improved with better blood sugar control and you may not need it.  We should repeat your fasting cholesterol next follow-up   2) schedule visit with Jeannette tolbertD once you have the Victoza from mail order.     3) once you start victoza, Stop januvia and glipizide          Once on victoza, start checking sugars before breakfast and dinner and call in readings after a few weeks (goal pre meal sugar less than 140)  4) return for diabetes visit after your trip (April)    Dr Cherry            Follow-ups after your visit        Who to contact     If you have questions or need follow up information about today's clinic visit or your schedule please contact [facility] directly at [phone].  Normal or non-critical lab and imaging results will be communicated to you by MyChart, letter or phone within 4 business days after the clinic has received the results. If you do not hear from us within 7 days, please contact the clinic through MyChart or phone. If you have a critical or abnormal lab result, we will notify you by phone as soon as possible.  Submit refill requests through [employer] or call your pharmacy and they will forward the refill request to us.  "Please allow 3 business days for your refill to be completed.          Additional Information About Your Visit        [employer] Information     "[employer] gives you secure access to your electronic health record. If you see a primary care provider, you can also send messages to your care team and make appointments. If you have questions, please call your primary care clinic.  If you do not have a primary care provider, please call [phone] and they will assist you.        Care EveryWhere ID     This is your Care EveryWhere ID. This could be used by other organizations to access your [place] medical records  [id number]        Your Vitals Were     Pulse Temperature Height BMI (Body Mass Index)          72 97.9  F (36.6  C) (Oral) 5' 9\" (1.753 m) 37.07 kg/m2         Blood Pressure from Last 3 Encounters:   01/16/18 130/60   07/21/17 132/80   07/05/17 130/60    Weight from Last 3 Encounters:   01/16/18 251 lb (113.9 kg)   07/21/17 247 lb (112 kg)   07/05/17 250 lb (113.4 kg)              We Performed the Following     Comprehensive metabolic panel (BMP + Alb, Alk Phos, ALT, AST, Total. Bili, TP)     Hemoglobin A1c     Lipid panel reflex to direct LDL Fasting          Today's Medication Changes          These changes are accurate as of: 1/16/18  8:58 AM.  If you have any questions, ask your nurse or doctor.               Start taking these medicines.        Dose/Directions    liraglutide 18 MG/3ML soln   Commonly known as:  VICTOZA   Used for:  Type 2 diabetes mellitus with hyperglycemia, without long-term current use of insulin (H)   Started by:  Jesus Cherry MD        Dose:  0.6 mg   Inject 0.6 mg Subcutaneous daily For 2 weeks then increase to 1.2mg daily   Quantity:  9 mL   Refills:  1         These medicines have changed or have updated prescriptions.        Dose/Directions    atorvastatin 20 MG tablet   Commonly known as:  LIPITOR   This may have changed:  See the new instructions.   Used for:  " Hyperlipidemia with target LDL less than 70   Changed by:  Jesus Cherry MD        Dose:  20 mg   Take 1 tablet (20 mg) by mouth daily   Quantity:  90 tablet   Refills:  3       lisinopril 10 MG tablet   Commonly known as:  PRINIVIL/ZESTRIL   This may have changed:  See the new instructions.   Used for:  Essential hypertension with goal blood pressure less than 140/90   Changed by:  Jesus Cherry MD        Dose:  10 mg   Take 1 tablet (10 mg) by mouth daily   Quantity:  90 tablet   Refills:  3       metFORMIN 1000 MG tablet   Commonly known as:  GLUCOPHAGE   This may have changed:  See the new instructions.   Used for:  Type 2 diabetes mellitus with hyperglycemia, without long-term current use of insulin (H)   Changed by:  Jesus Cherry MD        Dose:  1000 mg   Take 1 tablet (1,000 mg) by mouth 2 times daily (with meals)   Quantity:  180 tablet   Refills:  1       * order for DME   This may have changed:  Another medication with the same name was added. Make sure you understand how and when to take each.   Used for:  Type 2 diabetes mellitus without complication (H)   Changed by:  Jesus Cherry MD        Accu-check glucometer or per insurance coverage   Quantity:  1 Units   Refills:  0       * order for DME   This may have changed:  Another medication with the same name was added. Make sure you understand how and when to take each.   Used for:  Type 2 diabetes mellitus without complication (H)   Changed by:  Jesus Cherry MD        Accu-check glucometer test lancets, or per insurance coverage Testing once daily   Quantity:  3 Month   Refills:  3       * order for DME   This may have changed:  Another medication with the same name was added. Make sure you understand how and when to take each.   Used for:  Type 2 diabetes mellitus without complication (H)   Changed by:  Jesus Cherry MD        Accu-check glucometer test strips or per insurance coverage Testing once daily    Quantity:  3 Month   Refills:  3       * order for DME   This may have changed:  You were already taking a medication with the same name, and this prescription was added. Make sure you understand how and when to take each.   Used for:  Type 2 diabetes mellitus with hyperglycemia, without long-term current use of insulin (H)   Changed by:  Jesus Cherry MD        Equipment being ordered: victoza pen needles   Quantity:  90 Units   Refills:  3       pioglitazone 45 MG tablet   Commonly known as:  ACTOS   This may have changed:  See the new instructions.   Used for:  Type 2 diabetes mellitus with hyperglycemia, without long-term current use of insulin (H)   Changed by:  Jesus Cherry MD        Dose:  45 mg   Take 1 tablet (45 mg) by mouth daily   Quantity:  90 tablet   Refills:  1       * Notice:  This list has 4 medication(s) that are the same as other medications prescribed for you. Read the directions carefully, and ask your doctor or other care provider to review them with you.      Stop taking these medicines if you haven't already. Please contact your care team if you have questions.     fluticasone 50 MCG/ACT spray   Commonly known as:  FLONASE   Stopped by:  Jesus Cherry MD           gemfibrozil 600 MG tablet   Commonly known as:  LOPID   Stopped by:  Jesus Cherry MD           glipiZIDE XL 5 MG 24 hr tablet   Generic drug:  glipiZIDE   Stopped by:  Jesus Cherry MD           sitagliptin 100 MG tablet   Commonly known as:  JANUVIA   Stopped by:  Jesus Cherry MD           tiZANidine 4 MG tablet   Commonly known as:  ZANAFLEX   Stopped by:  Jesus Cherry MD                Where to get your medicines      These medications were sent to [facility] - [place], [place] - [address]  [address], [place] [address]     Phone:  [phone]     atorvastatin 20 MG tablet    liraglutide 18 MG/3ML soln    lisinopril 10 MG tablet     metFORMIN 1000 MG tablet    metoprolol succinate 25 MG 24 hr tablet    pioglitazone 45 MG tablet         Some of these will need a paper prescription and others can be bought over the counter.  Ask your nurse if you have questions.     Bring a paper prescription for each of these medications     order for DME                Primary Care Provider Office Phone # Fax #    Jesus Cherry [phone] [address] [place] [address]        Equal Access to Services     [facility] BIPIN : Hadii aad ku hadasho Soomaali, waaxda luqadaha, qaybta kaalmada adeegyada, waxay idiin hayaan adeeg poornimaofecorry laarcenio . So [facility] [phone].    ATENCIÓN: Si habla espgutierrez, tiene a zapata disposición servicios gratuitos de asistencia lingüística. Johaname al [phone].    We comply with applicable federal civil rights laws and [place] laws. We do not discriminate on the basis of race, color, national origin, age, disability, sex, sexual orientation, or gender identity.            Thank you!     Thank you for choosing [facility]  for your care. Our goal is always to provide you with excellent care. Hearing back from our patients is one way we can continue to improve our services. Please take a few minutes to complete the written survey that you may receive in the mail after your visit with us. Thank you!             Your Updated Medication List - Protect others around you: Learn how to safely use, store and throw away your medicines at www.disposemymeds.org.          This list is accurate as of: 1/16/18  8:58 AM.  Always use your most recent med list.                   Brand Name Dispense Instructions for use Diagnosis    aspirin 81 MG tablet     90 tablet    Take 1 tablet (81 mg) by mouth daily    Type 2 diabetes mellitus with diabetic nephropathy (H)       atorvastatin 20 MG tablet    LIPITOR    90 tablet    Take 1 tablet (20 mg) by mouth daily    Hyperlipidemia with target LDL less than 70        liraglutide 18 MG/3ML soln    VICTOZA    9 mL    Inject 0.6 mg Subcutaneous daily For 2 weeks then increase to 1.2mg daily    Type 2 diabetes mellitus with hyperglycemia, without long-term current use of insulin (H)       lisinopril 10 MG tablet    PRINIVIL/ZESTRIL    90 tablet    Take 1 tablet (10 mg) by mouth daily    Essential hypertension with goal blood pressure less than 140/90       metFORMIN 1000 MG tablet    GLUCOPHAGE    180 tablet    Take 1 tablet (1,000 mg) by mouth 2 times daily (with meals)    Type 2 diabetes mellitus with hyperglycemia, without long-term current use of insulin (H)       metoprolol succinate 25 MG 24 hr tablet    TOPROL-XL    45 tablet    Take 0.5 tablets (12.5 mg) by mouth daily    Coronary artery disease involving native coronary artery of native heart without angina pectoris       nitroGLYcerin 0.4 MG sublingual tablet    NITROSTAT    30 tablet    Place 1 tablet (0.4 mg) under the tongue every 5 minutes as needed for chest pain    Coronary artery disease involving native coronary artery of native heart without angina pectoris       omeprazole 20 MG tablet      Take 20 mg by mouth daily.        * order for DME     1 Units    Accu-check glucometer or per insurance coverage    Type 2 diabetes mellitus without complication (H)       * order for DME     3 Month    Accu-check glucometer test lancets, or per insurance coverage Testing once daily    Type 2 diabetes mellitus without complication (H)       * order for DME     3 Month    Accu-check glucometer test strips or per insurance coverage Testing once daily    Type 2 diabetes mellitus without complication (H)       * order for DME     90 Units    Equipment being ordered: victoza pen needles    Type 2 diabetes mellitus with hyperglycemia, without long-term current use of insulin (H)       pioglitazone 45 MG tablet    ACTOS    90 tablet    Take 1 tablet (45 mg) by mouth daily    Type 2 diabetes mellitus with hyperglycemia, without  long-term current use of insulin (H)       tamsulosin 0.4 MG capsule    FLOMAX    90 capsule    TAKE 1 CAPSULE EVERY DAY    Prostate cancer (H)       * Notice:  This list has 4 medication(s) that are the same as other medications prescribed for you. Read the directions carefully, and ask your doctor or other care provider to review them with you.

## 2018-01-16 NOTE — TELEPHONE ENCOUNTER
Patient checked on the price of Victoza and it is 800 per month supply so wants to stick with Januvia. Does he still stop the glipizide? [phone] ok to david.  Keila Jara RN

## 2018-01-16 NOTE — Clinical Note
Denys fisher--thought I could simplify Andrew's regimen.  Was going to have him see you regarding victoza start.  If he does well would also like to stop Actos at some point.  Just ROEL  Thank you! Manjinder

## 2018-01-16 NOTE — PATIENT INSTRUCTIONS
INSTRUCTIONS FOR TODAY:     1)stop gemfibrozil.   Suspect triglycerides have improved with better blood sugar control and you may not need it.  We should repeat your fasting cholesterol next follow-up   2) schedule visit with Jeannette tolbertD once you have the Victoza from mail order.     3) once you start victoza, Stop januvia and glipizide          Once on victoza, start checking sugars before breakfast and dinner and call in readings after a few weeks (goal pre meal sugar less than 140)  4) return for diabetes visit after your trip (April)    Dr Cherry

## 2018-01-17 NOTE — TELEPHONE ENCOUNTER
Scheduled patient for MTM visit tomorrow at 1:30pm.  Asked him to check pre/post blood sugar around dinner tonight and in the morning before he eats.    Jeannette Smith, PharmD [facility]  Medication Therapy Management Practitioner   #[phone]

## 2018-01-18 ENCOUNTER — OFFICE VISIT (OUTPATIENT)
Dept: PHARMACY | Facility: CLINIC | Age: 68
End: 2018-01-18
Payer: COMMERCIAL

## 2018-01-18 VITALS
WEIGHT: 253.2 LBS | BODY MASS INDEX: 37.39 KG/M2 | SYSTOLIC BLOOD PRESSURE: 144 MMHG | DIASTOLIC BLOOD PRESSURE: 62 MMHG | HEART RATE: 69 BPM

## 2018-01-18 DIAGNOSIS — E78.5 HYPERLIPIDEMIA WITH TARGET LDL LESS THAN 70: ICD-10-CM

## 2018-01-18 DIAGNOSIS — K59.00 CONSTIPATION, UNSPECIFIED CONSTIPATION TYPE: ICD-10-CM

## 2018-01-18 DIAGNOSIS — E11.65 TYPE 2 DIABETES MELLITUS WITH HYPERGLYCEMIA, WITHOUT LONG-TERM CURRENT USE OF INSULIN (H): Primary | ICD-10-CM

## 2018-01-18 DIAGNOSIS — M25.511 CHRONIC RIGHT SHOULDER PAIN: ICD-10-CM

## 2018-01-18 DIAGNOSIS — G89.29 CHRONIC RIGHT SHOULDER PAIN: ICD-10-CM

## 2018-01-18 DIAGNOSIS — I10 ESSENTIAL HYPERTENSION WITH GOAL BLOOD PRESSURE LESS THAN 140/90: ICD-10-CM

## 2018-01-18 DIAGNOSIS — I25.10 CORONARY ARTERY DISEASE WITHOUT ANGINA PECTORIS, UNSPECIFIED VESSEL OR LESION TYPE, UNSPECIFIED WHETHER NATIVE OR TRANSPLANTED HEART: ICD-10-CM

## 2018-01-18 DIAGNOSIS — K21.9 GASTROESOPHAGEAL REFLUX DISEASE WITHOUT ESOPHAGITIS: ICD-10-CM

## 2018-01-18 DIAGNOSIS — C61 PROSTATE CANCER (H): ICD-10-CM

## 2018-01-18 PROCEDURE — 99605 MTMS BY PHARM NP 15 MIN: CPT | Performed by: PHARMACIST

## 2018-01-18 PROCEDURE — 99607 MTMS BY PHARM ADDL 15 MIN: CPT | Performed by: PHARMACIST

## 2018-01-18 RX ORDER — PIOGLITAZONEHYDROCHLORIDE 15 MG/1
15 TABLET ORAL DAILY
Qty: 90 TABLET | Refills: 1 | Status: SHIPPED | OUTPATIENT
Start: 2018-01-18 | End: 2018-07-23

## 2018-01-18 RX ORDER — DOCUSATE SODIUM 100 MG/1
100 CAPSULE, LIQUID FILLED ORAL DAILY PRN
COMMUNITY
End: 2021-01-11

## 2018-01-18 NOTE — MR AVS SNAPSHOT
After Visit Summary   1/18/2018    Héctor Quispe    MRN: [id number]           Patient Information     Date Of Birth          1950        Visit Information        Provider Department      1/18/2018 1:30 PM Jeannette Smith, [facility] [place] MTM        Today's Diagnoses     Type 2 diabetes mellitus with hyperglycemia, without long-term current use of insulin (H)          Care Instructions    Recommendations from today's MTM visit:                                                    MTM (medication therapy management) is a service provided by a clinical pharmacist designed to help you get the most of out of your medicines.   Today we reviewed what your medicines are for, how to know if they are working, that your medicines are safe and how to make your medicine regimen as easy as possible.     1. Make sure if you are feeling low that you test your blood sugar.     2. Discontinue glipizide.     3. Decrease pioglitazone to 15 mg once daily. Stop the pioglitazone 45 mg dose.     4. Try testing blood sugar a couple times a week in the morning before breakfast and 2 hours after dinner.     5. Try to not use/minimize ibuprofen use. Try Tylenol 500-1000 mg every 6 hours as needed for pain (max 3000 mg/day).     Next MTM visit: 4-6 weeks    To schedule another MTM appointment, please call the clinic directly or you may call the MTM scheduling line at [phone] or toll-free at [phone].     My Clinical Pharmacist's contact information:                                                      It was a pleasure seeing you today!  Please feel free to contact me with any questions or concerns you have.      Kaci Salomon, PharmD  Pharmaceutical Care Resident   Pager: [phone]      You may receive a survey about the MTM services you received.  I would appreciate your feedback to help me serve you better in the future. Please fill it out and return it when you can. Your comments will be  anonymous.                    Follow-ups after your visit        Your next 10 appointments already scheduled     Mar 01, 2018 10:00 AM CST   SHORT with Jeannette Smith RPH   [facility] ([facility])    [address] [place] [address]  [address]  [place] [address] [phone]            Apr 11, 2018  9:00 AM CDT   SHORT with Jesus Cherry MD   [facility] ([facility])    [address] [place] [address]  [address]  [place] [address] [phone]              Who to contact     If you have questions or need follow up information about today's clinic visit or your schedule please contact [facility] directly at [phone].  Normal or non-critical lab and imaging results will be communicated to you by MyChart, letter or phone within 4 business days after the clinic has received the results. If you do not hear from us within 7 days, please contact the clinic through MyChart or phone. If you have a critical or abnormal lab result, we will notify you by phone as soon as possible.  Submit refill requests through [employer] or call your pharmacy and they will forward the refill request to us. Please allow 3 business days for your refill to be completed.          Additional Information About Your Visit        [employer] Information     [employer] gives you secure access to your electronic health record. If you see a primary care provider, you can also send messages to your care team and make appointments. If you have questions, please call your primary care clinic.  If you do not have a primary care provider, please call [phone] and they will assist you.        Care EveryWhere ID     This is your Care EveryWhere ID. This could be used by other organizations to access your [facility] medical records  [id number]        Your Vitals Were     Pulse BMI (Body Mass Index)                69 37.39 kg/m2           Blood Pressure from Last 3 Encounters:    01/18/18 144/62   01/16/18 130/60   07/21/17 132/80    Weight from Last 3 Encounters:   01/18/18 253 lb 3.2 oz (114.9 kg)   01/16/18 251 lb (113.9 kg)   07/21/17 247 lb (112 kg)              Today, you had the following     No orders found for display         Today's Medication Changes          These changes are accurate as of: 1/18/18  2:37 PM.  If you have any questions, ask your nurse or doctor.               These medicines have changed or have updated prescriptions.        Dose/Directions    pioglitazone 15 MG tablet   Commonly known as:  ACTOS   This may have changed:    - medication strength  - how much to take   Used for:  Type 2 diabetes mellitus with hyperglycemia, without long-term current use of insulin (H)        Dose:  15 mg   Take 1 tablet (15 mg) by mouth daily   Quantity:  90 tablet   Refills:  1            Where to get your medicines      These medications were sent to [facility] - [facility] [address]  [address] [address]     Phone:  [phone]     pioglitazone 15 MG tablet                Primary Care Provider Office Phone # Fax #    Jesus Cherry [phone] [address] [place] [address]        Equal Access to Services     LATONIA VOSS AH: Hadii ileana groves hadasho Soronnali, waaxda luqadaha, qaybta kaalmada adeegyada, lit woodruff. So [facility] [phone].    ATENCIÓN: Si habla español, tiene a zapata disposición servicios gratuitos de asistencia lingüística. Llame al [phone].    We comply with applicable federal civil rights laws and [place] laws. We do not discriminate on the basis of race, color, national origin, age, disability, sex, sexual orientation, or gender identity.            Thank you!     Thank you for choosing [facility]  for your care. Our goal is always to provide you with excellent care. Hearing back from our patients is one way we can  continue to improve our services. Please take a few minutes to complete the written survey that you may receive in the mail after your visit with us. Thank you!             Your Updated Medication List - Protect others around you: Learn how to safely use, store and throw away your medicines at www.disposemymeds.org.          This list is accurate as of: 1/18/18  2:37 PM.  Always use your most recent med list.                   Brand Name Dispense Instructions for use Diagnosis    aspirin 81 MG tablet     90 tablet    Take 1 tablet (81 mg) by mouth daily    Type 2 diabetes mellitus with diabetic nephropathy (H)       atorvastatin 20 MG tablet    LIPITOR    90 tablet    Take 1 tablet (20 mg) by mouth daily    Hyperlipidemia with target LDL less than 70       docusate sodium 100 MG capsule    COLACE     Take 100 mg by mouth daily as needed for constipation        lisinopril 10 MG tablet    PRINIVIL/ZESTRIL    90 tablet    Take 1 tablet (10 mg) by mouth daily    Essential hypertension with goal blood pressure less than 140/90       metFORMIN 1000 MG tablet    GLUCOPHAGE    180 tablet    Take 1 tablet (1,000 mg) by mouth 2 times daily (with meals)    Type 2 diabetes mellitus with hyperglycemia, without long-term current use of insulin (H)       metoprolol succinate 25 MG 24 hr tablet    TOPROL-XL    45 tablet    Take 0.5 tablets (12.5 mg) by mouth daily    Coronary artery disease involving native coronary artery of native heart without angina pectoris       nitroGLYcerin 0.4 MG sublingual tablet    NITROSTAT    30 tablet    Place 1 tablet (0.4 mg) under the tongue every 5 minutes as needed for chest pain    Coronary artery disease involving native coronary artery of native heart without angina pectoris       omeprazole 20 MG tablet      Take 20 mg by mouth daily.        * order for DME     1 Units    Accu-check glucometer or per insurance coverage    Type 2 diabetes mellitus without complication (H)       * order for DME      3 Month    Accu-check glucometer test lancets, or per insurance coverage Testing once daily    Type 2 diabetes mellitus without complication (H)       * order for DME     3 Month    Accu-check glucometer test strips or per insurance coverage Testing once daily    Type 2 diabetes mellitus without complication (H)       * order for DME     90 Units    Equipment being ordered: victoza pen needles    Type 2 diabetes mellitus with hyperglycemia, without long-term current use of insulin (H)       pioglitazone 15 MG tablet    ACTOS    90 tablet    Take 1 tablet (15 mg) by mouth daily    Type 2 diabetes mellitus with hyperglycemia, without long-term current use of insulin (H)       tamsulosin 0.4 MG capsule    FLOMAX    90 capsule    TAKE 1 CAPSULE EVERY DAY    Prostate cancer (H)       * Notice:  This list has 4 medication(s) that are the same as other medications prescribed for you. Read the directions carefully, and ask your doctor or other care provider to review them with you.

## 2018-01-18 NOTE — Clinical Note
Hi Dr. Cherry,   Please see chart by ROEL for changes for Héctor. Please let me know if you have any questions or concerns. Thank you!   Kaci Salomon, PharmD Pharmaceutical Care Resident  Pager: [phone]

## 2018-01-18 NOTE — PROGRESS NOTES
SUBJECTIVE/OBJECTIVE:                           Héctor Quispe is a 67 year old male coming in for an initial visit for Medication Therapy Management.  He was referred to me from Dr. Cherry.     Chief Complaint: Wondering what to take for blood sugars.     Allergies/ADRs: Reviewed in Epic  Tobacco: History of tobacco dependence - quit 2003  Alcohol: Will drink on social occassions, 2 beers at that time  Caffeine: 2-3 cups/day of coffee  Activity: Recently has started walking more and joined the [employer] - started playing [employer].   PMH: Reviewed in Epic    Medication Adherence/Access  The patient misses their medication 1 times per week, if any - more so if out of regular routine, like vacation. Usually evening medications because is stringent about morning medications.   Patient is responsible for his/her own medications. Has a pill box.   Adherence/Compliance is described as good.  Medication barriers: no issues reported by patient.  The patient fills general medications at  [facility].      Diabetes:  Pt currently taking metformin 1000 mg BID, pioglitazone 45 mg once daily in AM, Januvia 100 mg once daily, glipizide XL 5 mg once daily with breakfast. Pt is not experiencing side effects. Was going to get off Januvia to Victoza, but Victoza was very expensive. Januvia is also expensive for him, $200/3 months, but Victoza through insurance was going to be $800/3 months. Finding that he has been battling with weight loss. Him and his wife have been working on diet/lifestyle throughout all last year - he has been struggling while his wife has lost >100 lb. For him, his biggest concern is the issue he is having is weight rather than cost of medications.   SMBG: never. Used to test more frequently, but doesn't do as much anymore. Was asked to take 2-hour post and fasting blood sugar. Ranges (patient reported):   1/17: Tested 2 hours after meal: 90 mg/dL   1/18: 120 mg/dL   Patient is not experiencing hypoglycemia Can tell when  "he is low, gets \"apprehensive\", just doesn't feel right, nervous. Will drink a glass of orange juice when low. Feels like he fights a \"low\" carvajal, especially when or after he exercises.   Recent symptoms of high blood sugar? No.   Eye exam: up to date  Foot exam: up to date  Microalbumin is not < 30 mg/g. Pt is taking an ACEi/ARB.  Aspirin: Taking 81mg daily in the morning and denies side effects. Was previously on aspirin 325 mg, but was lowered to aspirin 81 mg once daily.   Exercise: Started playing pickleball more and walks frequently on the treadmill. When exercising he finds he get low. But also feels like he gets hungry.   Diet: He tried to eat a generally healthy diet, but knows this is something he needs to work on. During the holidays it was especially difficult due to many homemade goods in the house. Other than holidays, has not been indulging in cakes, breads, cookies. Him and his wife have been working on diet/lifestyle management.   Lab Results   Component Value Date    A1C 6.3 01/16/2018    A1C 6.1 07/21/2017    A1C 6.3 02/28/2017    A1C 6.2 09/13/2016    A1C 6.2 01/06/2016     Wt Readings from Last 4 Encounters:   01/18/18 253 lb 3.2 oz (114.9 kg)   01/16/18 251 lb (113.9 kg)   07/21/17 247 lb (112 kg)   07/05/17 250 lb (113.4 kg)     Hypertension/CAD: Current medications include lisinopril 10 mg once daily, metoprolol XL 12.5 mg once daily. Was previously on 25 mg, but had orthostatic hypotension so lowered dose. Continues on aspirin and statin. Patient does not self-monitor BP regularly - will periodically  Patient reports no current medication side effects. Previously had an MI 15 years ago, then had another MI 2 years after that - quit smoking at that time. Has Nitrostat as needed for chest pain, hasn't had to use since second heart attack.     Hyperlipidemia: Current therapy includes atorvastatin 20mg once daily. Recently stopped gemfibrozil yesterday. Was put on previously due to high " triglycerides, but stopped gemfibrozil since Lipid panel appeared to be in order (per Dr. Cherry).  Pt reports no significant myalgias or other side effects.   Cholesterol   Date Value Ref Range Status   01/16/2018 128 <200 mg/dL Final     LDL Cholesterol Calculated   Date Value Ref Range Status   01/16/2018 33 <100 mg/dL Final     Comment:     Desirable:       <100 mg/dl     Triglycerides   Date Value Ref Range Status   01/16/2018 83 <150 mg/dL Final     Comment:     Fasting specimen     HDL Cholesterol   Date Value Ref Range Status   01/16/2018 78 >39 mg/dL Final     Hx Prostate Cancer: Current medications include: tamsulosin 0.4 mg daily. Diagnosed with prostate cancer 5 years ago- initially was going to have a prostatectomy, then decided to started radiation and now is cancer free. No medication side effects. The medication is effective for urinary symptoms that he sometimes experiences. PSA is WNL. No medication side effects.   PSA   Date Value Ref Range Status   07/21/2017 0.02 0 - 4 ug/L Final     Comment:     Assay Method:  Chemiluminescence using Siemens Vista analyzer     Pain: Current medication includes: ibuprofen 200-400 mg as needed. Last time he used ibuprofen for pain it was last week. Has spinal stenosis. Gets injections for the pain. Getting injection in 1 week. No medication side effects. Pain is largely located in shoulders  Estimated Creatinine Clearance: 77.5 mL/min (based on Cr of 1.12).    Heartburn: Current medications include: omeprazole 20 mg once daily. No medication side effects. He has tried going off of the medication, but found to have increased heartburn symptoms. Cannot pinpoint foods that trigger heartburn, doesn't know if it is necessarily food related.     Constipation: Current medications include: docusate 100 mg once daily at bedtime. Has bowel movement every day and finds that this helps keep him regular.     Current labs include:  BP Readings from Last 3 Encounters:   01/18/18  144/62   01/16/18 130/60   07/21/17 132/80     Today's Vitals: /62 (BP Location: Right arm, Patient Position: Right side)  Pulse 69  Wt 253 lb 3.2 oz (114.9 kg)  BMI 37.39 kg/m2  Lab Results   Component Value Date    A1C 6.3 01/16/2018   .  Lab Results   Component Value Date    CHOL 128 01/16/2018     Lab Results   Component Value Date    TRIG 83 01/16/2018     Lab Results   Component Value Date    HDL 78 01/16/2018     Lab Results   Component Value Date    LDL 33 01/16/2018       Liver Function Studies -   Recent Labs   Lab Test  01/16/18   0907   PROTTOTAL  6.9   ALBUMIN  4.0   BILITOTAL  0.3   ALKPHOS  54   AST  17   ALT  19       Lab Results   Component Value Date    UCRR 143 02/28/2017    MICROL 266 02/28/2017    UMALCR 186.01 (H) 02/28/2017       Last Basic Metabolic Panel:  Lab Results   Component Value Date     01/16/2018      Lab Results   Component Value Date    POTASSIUM 4.5 01/16/2018     Lab Results   Component Value Date    CHLORIDE 106 01/16/2018     Lab Results   Component Value Date    BUN 19 01/16/2018     Lab Results   Component Value Date    CR 1.12 01/16/2018     GFR Estimate   Date Value Ref Range Status   01/16/2018 65 >60 mL/min/1.7m2 Final     Comment:     Non  GFR Calc   02/28/2017 84 >60 mL/min/1.7m2 Final     Comment:     Non  GFR Calc   01/06/2016 70 >60 mL/min/1.7m2 Final     Comment:     Non  GFR Calc     GFR Estimate If Black   Date Value Ref Range Status   01/16/2018 79 >60 mL/min/1.7m2 Final     Comment:      GFR Calc   02/28/2017 >90   GFR Calc   >60 mL/min/1.7m2 Final   01/06/2016 85 >60 mL/min/1.7m2 Final     Comment:      GFR Calc     TSH   Date Value Ref Range Status   09/13/2016 2.13 0.40 - 4.00 mU/L Final   ]    Most Recent Immunizations   Administered Date(s) Administered     Influenza (High Dose) 3 valent vaccine 09/13/2016     Influenza (IIV3) PF 12/04/2012      Pneumo Conj 13-V (2010&after) 09/13/2016     Pneumococcal 23 valent 12/04/2012     TD (ADULT, 7+) 10/28/2005     TDAP Vaccine (Adacel) 12/16/2013     Zoster vaccine, live 01/04/2013       ASSESSMENT:                             Current medications were reviewed today.       Medication Adherence: no issues identified    Diabetes: Stable. Patient is meeting A1c goal of < 7%. Self monitoring of blood glucose is at goal of fasting  mg/dL and post prandial < 180 mg/dL. Pt would benefit from minimum SMBG: Check blood sugars fasting, and occasionally 2 hours after starting a meal. Due to patient's A1c being largely under goal even with indulging more thoughout the holidays, think it would be reasonable to minimize drug therapy for diabetes. Due to issues with weight gain and low blood sugars (especially since exercising more), recommend discontinuation of glipizide XL 5 mg. Also, recommend lowering Actos (pioglitazone): decrease dose to 15 mg once daily. Patient was encouraged to continue with exercise regimen and to really focus on diet management - 60-75 grams of carbohydrates for meals and 15-30 grams of carbohydrates with snacks. Educated patient on how to watch for, managed and treat for low blood sugar if necessary. Patient is due for influenza and pneumococcal vaccination - will assess at next encounter.     Hypertension/CAD: Needs improvement. On evaluation today, blood pressure was >140/90 mmHg, which is out of range for what he has normally been the past couple months. Will re-evaluate blood pressure at next encounter. Due to microalbumin >30 mg/g, future consideration would be to increase lisinopril to 20 mg once daily.     Hyperlipidemia: Stable.     Hx Prostate Cancer: Stable.      Pain: Needs improvement.  Patient was encouraged to minimize ibuprofen use and to use tylenol first due to risk of GI and kidney issues associated with NSAIDs like ibuprofen.      Heartburn: Stable.      Constipation: Stable.      PLAN:                            Diabetes: Patient to:   1. Make sure if feeling low that you test your blood sugar and treat if necessary.     2. Discontinue glipizide.     3. Decrease pioglitazone to 15 mg once daily. Stop the pioglitazone 45 mg dose.     4. Try testing blood sugar in morning before breakfast and sometimes 2 hours after dinner.     5. Shoot for 60-75 grams of carbohydrates for meals and 15-30 grams of carbohydrates with snacks.     Pain:   6. Try to not use/minimize ibuprofen use. Can use Tylenol 500-1000 mg every 6 hours as needed for pain (max 3000 mg/day).     Future considerations: due for influenza and pneumococcal vaccination - will reassess at next visit.     I spent 60 minutes with this patient today. All changes were made via verbal approval before visit discussed case with Jesus Cherry. A copy of the visit note was provided to the patient's primary care provider.    Will follow up in 6 weeks- patient is leaving on vacation to go to [place] for 1 month (March 7th-April 10th) so wanted to get patient in prior to vacation. Has appointment with Dr. Cherry 4/11/18.     The patient was given a summary of these recommendations as an after visit summary.     Kaci Salomon PharmD  Pharmaceutical Care Resident   Pager: [phone]    I agree with the resident note and plan of care.      Jeannette Smith PharmD [facility]  Medication Therapy Management Provider  Pager #[phone]

## 2018-01-18 NOTE — PATIENT INSTRUCTIONS
Recommendations from today's MTM visit:                                                    MTM (medication therapy management) is a service provided by a clinical pharmacist designed to help you get the most of out of your medicines.   Today we reviewed what your medicines are for, how to know if they are working, that your medicines are safe and how to make your medicine regimen as easy as possible.     1. Make sure if you are feeling low that you test your blood sugar.     2. Discontinue glipizide.     3. Decrease pioglitazone to 15 mg once daily. Stop the pioglitazone 45 mg dose.     4. Try testing blood sugar in morning before breakfast and sometimes 2 hours after dinner. Shoot for 60-75 grams of carbohydrates for meals and 15-30 grams of carbohydrates with snacks.     5. Try to not use/minimize ibuprofen use. Try Tylenol 500-1000 mg every 6 hours as needed for pain (max 3000 mg/day).     Next MTM visit: 4-6 weeks    To schedule another MTM appointment, please call the clinic directly or you may call the MTM scheduling line at [phone] or toll-free at [phone].     My Clinical Pharmacist's contact information:                                                      It was a pleasure seeing you today!  Please feel free to contact me with any questions or concerns you have.      Kaci Salomon, PharmD  Pharmaceutical Care Resident   Pager: [phone]      You may receive a survey about the MTM services you received.  I would appreciate your feedback to help me serve you better in the future. Please fill it out and return it when you can. Your comments will be anonymous.

## 2018-01-19 DIAGNOSIS — E11.65 TYPE 2 DIABETES MELLITUS WITH HYPERGLYCEMIA, WITHOUT LONG-TERM CURRENT USE OF INSULIN (H): ICD-10-CM

## 2018-01-19 RX ORDER — PIOGLITAZONEHYDROCHLORIDE 15 MG/1
15 TABLET ORAL DAILY
Qty: 90 TABLET | Refills: 1 | Status: CANCELLED | OUTPATIENT
Start: 2018-01-19

## 2018-01-19 NOTE — TELEPHONE ENCOUNTER
Requested Prescriptions   Pending Prescriptions Disp Refills     order for DME 90 Units 3     Sig: Equipment being ordered: victoza pen needles    There is no refill protocol information for this order      Last Written Prescription Date:  01/16/2018  Last Fill Quantity: 90units,  # refills: 3   Last Office Visit with G, P or [facility] prescribing provider:  01/16/2018  Future Office Visit:    Next 5 appointments (look out 90 days)     Mar 01, 2018 10:00 AM CST   SHORT with Jeannette Smith [facility]    [address] [place] [address]  [address]  [place] [address]   [phone]            Apr 11, 2018  9:00 AM CDT   [facility] with [facility]   [facility]    [address] [place] [address]  [address]  [place] [address] [phone]

## 2018-01-22 NOTE — TELEPHONE ENCOUNTER
Generic order sent last week, not sure how often these  Are being used?  Please sign if ok.  Nayeli Chan, RN  Triage Nurse

## 2018-01-22 NOTE — TELEPHONE ENCOUNTER
Patient does not need pen needles.  He is not going to try the Victoza.  We decided on different regimen at MT appointment    Thank you for your visit and please feel free to call ([phone] voicemail/pager) or e-mail ([email or web address]) with any questions.   Jeannette Smith, PharmD BCPS  Medication Therapy Management Practitioner

## 2018-01-22 NOTE — TELEPHONE ENCOUNTER
BD UF PEN NDL 31G 5/16IN (8MM) SHORT      Last Written Prescription Date:  NA  Last Fill Quantity: NA,   # refills: NA  Last Office Visit: 1/16/18  Future Office visit:    Next 5 appointments (look out 90 days)     Mar 01, 2018 10:00 AM CST   SHORT with Jeannette Smith [facility] ([facility])    [address] [place] [address]  [address]  [facility] [address]   [phone]            Apr 11, 2018  9:00 AM CDT   SHORT with Jesus Cherry [facility]   [facility]    [address]  [address]  [facility] [address]   [phone]                   Routing refill request to provider for review/approval because:  Drug not on the [facility], [facility] or  Health refill protocol or controlled substance  Drug not active on patient's medication list

## 2018-01-23 NOTE — TELEPHONE ENCOUNTER
Not on medication list, victoza not prescribed due to cost.  Not needed. Request removed, message closed.  Nayeli Chan, RN  Triage Nurse

## 2018-01-23 NOTE — TELEPHONE ENCOUNTER
1-18-18 OV:   Also, recommend lowering Actos (pioglitazone): decrease dose to 15 mg once daily.   Rx did go to [facility] 1-18-18.  Tamika Tabares RN

## 2018-02-15 ENCOUNTER — OFFICE VISIT (OUTPATIENT)
Dept: PEDIATRICS | Facility: CLINIC | Age: 68
End: 2018-02-15
Payer: COMMERCIAL

## 2018-02-15 VITALS
TEMPERATURE: 97.9 F | HEART RATE: 64 BPM | WEIGHT: 251 LBS | SYSTOLIC BLOOD PRESSURE: 118 MMHG | BODY MASS INDEX: 37.07 KG/M2 | DIASTOLIC BLOOD PRESSURE: 60 MMHG

## 2018-02-15 DIAGNOSIS — R31.0 GROSS HEMATURIA: Primary | ICD-10-CM

## 2018-02-15 LAB
ALBUMIN UR-MCNC: 30 MG/DL
APPEARANCE UR: CLEAR
BILIRUB UR QL STRIP: NEGATIVE
COLOR UR AUTO: YELLOW
GLUCOSE UR STRIP-MCNC: 100 MG/DL
HGB UR QL STRIP: ABNORMAL
KETONES UR STRIP-MCNC: NEGATIVE MG/DL
LEUKOCYTE ESTERASE UR QL STRIP: NEGATIVE
NITRATE UR QL: NEGATIVE
PH UR STRIP: 5.5 PH (ref 5–7)
RBC #/AREA URNS AUTO: ABNORMAL /HPF
SOURCE: ABNORMAL
SP GR UR STRIP: 1.02 (ref 1–1.03)
UROBILINOGEN UR STRIP-ACNC: 0.2 EU/DL (ref 0.2–1)
WBC #/AREA URNS AUTO: ABNORMAL /HPF

## 2018-02-15 PROCEDURE — 99213 OFFICE O/P EST LOW 20 MIN: CPT | Performed by: INTERNAL MEDICINE

## 2018-02-15 PROCEDURE — 81001 URINALYSIS AUTO W/SCOPE: CPT | Performed by: INTERNAL MEDICINE

## 2018-02-15 NOTE — NURSING NOTE
"Chief Complaint   Patient presents with     Urinary Problem       Initial /60 (Cuff Size: Adult Regular)  Pulse 64  Temp 97.9  F (36.6  C) (Oral)  Wt 251 lb (113.9 kg)  BMI 37.07 kg/m2 Estimated body mass index is 37.07 kg/(m^2) as calculated from the following:    Height as of 1/16/18: 5' 9\" (1.753 m).    Weight as of this encounter: 251 lb (113.9 kg).  Medication Reconciliation: les Rowland CMA    "

## 2018-02-15 NOTE — PROGRESS NOTES
SUBJECTIVE:   Héctor Quispe is a 67 year old male who presents to clinic today for the following health issues:    Genitourinary symptoms      Duration: started last week    Description:  Hematuria, urine grossly bloody each void for past 4-5 days    Intensity:  mild    Accompanying signs and symptoms (fever/discharge/nausea/vomiting/back or abdominal pain):  No dysuria or flank pain    History (frequent UTI's/kidney stones/prostate problems): None  Sexually active: no     Precipitating or alleviating factors: None    Therapies tried and outcome: none   Outcome:       Patient Active Problem List   Diagnosis     Colon polyps     Chronic right shoulder pain- recurrent since 2013     Cervical spinal stenosis     Type 2 diabetes mellitus with hyperglycemia, without long-term current use of insulin (H)     Essential hypertension with goal blood pressure less than 140/90     Hyperlipidemia with target LDL less than 70     Past Surgical History:   Procedure Laterality Date     ENT SURGERY       prostate       STENT, CORONARY, LUIS M      h/o coronary stent x 2       Social History   Substance Use Topics     Smoking status: Former Smoker     Types: Cigarettes     Quit date: 8/2/2002     Smokeless tobacco: Never Used     Alcohol use 1.8 oz/week     3 Cans of beer per week     Family History   Problem Relation Age of Onset     Alzheimer Disease Mother      CANCER Father      DIABETES No family hx of      Coronary Artery Disease No family hx of      Hypertension No family hx of      Hyperlipidemia No family hx of      CEREBROVASCULAR DISEASE No family hx of      Breast Cancer No family hx of      Colon Cancer No family hx of      Prostate Cancer No family hx of      Other Cancer No family hx of      Depression No family hx of      Anxiety Disorder No family hx of      MENTAL ILLNESS No family hx of      Substance Abuse No family hx of      Anesthesia Reaction No family hx of      Asthma No family hx of      OSTEOPOROSIS No  family hx of      Genetic Disorder No family hx of      Thyroid Disease No family hx of      Obesity No family hx of      Unknown/Adopted No family hx of          Current Outpatient Prescriptions   Medication Sig Dispense Refill     docusate sodium (COLACE) 100 MG capsule Take 100 mg by mouth daily as needed for constipation       pioglitazone (ACTOS) 15 MG tablet Take 1 tablet (15 mg) by mouth daily 90 tablet 1     atorvastatin (LIPITOR) 20 MG tablet Take 1 tablet (20 mg) by mouth daily 90 tablet 3     lisinopril (PRINIVIL/ZESTRIL) 10 MG tablet Take 1 tablet (10 mg) by mouth daily 90 tablet 3     metFORMIN (GLUCOPHAGE) 1000 MG tablet Take 1 tablet (1,000 mg) by mouth 2 times daily (with meals) 180 tablet 1     metoprolol succinate (TOPROL-XL) 25 MG 24 hr tablet Take 0.5 tablets (12.5 mg) by mouth daily 45 tablet 3     order for DME Equipment being ordered: victoza pen needles 90 Units 3     tamsulosin (FLOMAX) 0.4 MG capsule TAKE 1 CAPSULE EVERY DAY 90 capsule 3     nitroglycerin (NITROSTAT) 0.4 MG SL tablet Place 1 tablet (0.4 mg) under the tongue every 5 minutes as needed for chest pain 30 tablet 6     aspirin 81 MG tablet Take 1 tablet (81 mg) by mouth daily 90 tablet 11     order for DME Accu-check glucometer or per insurance coverage 1 Units 0     order for DME Accu-check glucometer test lancets, or per insurance coverage  Testing once daily 3 Month 3     order for DME Accu-check glucometer test strips or per insurance coverage  Testing once daily 3 Month 3     omeprazole 20 MG tablet Take 20 mg by mouth daily.           OBJECTIVE:                                                    /60 (Cuff Size: Adult Regular)  Pulse 64  Temp 97.9  F (36.6  C) (Oral)  Wt 251 lb (113.9 kg)  BMI 37.07 kg/m2 Body mass index is 37.07 kg/(m^2).  GENERAL:  alert,  no distress       Results for orders placed or performed in visit on 02/15/18   *UA reflex to Microscopic and Culture ([facility] ([facility])   Result Value Ref Range    Color Urine Yellow     Appearance Urine Clear     Glucose Urine 100 (A) NEG^Negative mg/dL    Bilirubin Urine Negative NEG^Negative    Ketones Urine Negative NEG^Negative mg/dL    Specific Gravity Urine 1.020 1.003 - 1.035    Blood Urine Moderate (A) NEG^Negative    pH Urine 5.5 5.0 - 7.0 pH    Protein Albumin Urine 30 (A) NEG^Negative mg/dL    Urobilinogen Urine 0.2 0.2 - 1.0 EU/dL    Nitrite Urine Negative NEG^Negative    Leukocyte Esterase Urine Negative NEG^Negative    Source Midstream Urine    Urine Microscopic   Result Value Ref Range    WBC Urine O - 2 OTO2^O - 2 /HPF    RBC Urine 10-25 (A) OTO2^O - 2 /HPF        ASSESSMENT/PLAN:                                                        ICD-10-CM    1. Gross hematuria R31.0 *UA reflex to Microscopic and Culture ([facility] (except [place] and [place])     Urine Microscopic     UROLOGY ADULT REFERRAL      Ureteral stone less likely.  H/o prostate ca previously treated with brachytherapy.    Needs cystoscopy ?prostate ca recurrence vs bladder polyp/mass or stone  Referred to Urology    Jesus Cherry MD  [facility]

## 2018-02-15 NOTE — MR AVS SNAPSHOT
After Visit Summary   2/15/2018    Héctor Quispe    MRN: [id number]           Patient Information     Date Of Birth          1950        Visit Information        Provider Department      2/15/2018 11:20 AM Jesus Cherry MD [facility]        Today's Diagnoses     Gross hematuria    -  1      Care Instructions    INSTRUCTIONS FOR TODAY:     schedule visit with Urology (likely cystoscopy)      Call if you have trouble getting a visit within the next week     Dr Cherry            Follow-ups after your visit        Additional Services     UROLOGY ADULT REFERRAL       Your provider has referred you to: [facility]: [facility] [phone]   [email or web address]    Dr Vegas, Dr Cortes    Please be aware that coverage of these services is subject to the terms and limitations of your health insurance plan.  Call member services at your health plan with any benefit or coverage questions.      Please bring the following with you to your appointment:    (1) Any X-Rays, CTs or MRIs which have been performed.  Contact the facility where they were done to arrange for  prior to your scheduled appointment.    (2) List of current medications  (3) This referral request   (4) Any documents/labs given to you for this referral                  Your next 10 appointments already scheduled     Mar 01, 2018 10:00 AM CST   SHORT [facility] ([facility])    [address] [place] [address]  [address]  [facility] [address]   [phone]            Apr 11, 2018  9:00 AM CDT   SHORT with [facility] MD   [facility]    [address] [place] [address]  [address]  [facility] [address]   [phone]              Who to contact     If you have questions or need follow up information about today's clinic visit or your schedule please contact [facility] directly at [phone].  Normal or non-critical lab and imaging results will be communicated to you by MyChart, letter or phone within 4 business days after the clinic has received the results. If you do not hear from us within 7 days, please contact the clinic through [employer] or phone. If you have a critical or abnormal lab result, we will notify you by phone as soon as possible.  Submit refill requests through [employer] or call your pharmacy and they will forward the refill request to us. Please allow 3 business days for your refill to be completed.          Additional Information About Your Visit        [employer] Information     [employer] gives you secure access to your electronic health record. If you see a primary care provider, you can also send messages to your care team and make appointments. If you have questions, please call your primary care clinic.  If you do not have a primary care provider, please call [phone] and they will assist you.        Care EveryWhere ID     This is your Care EveryWhere ID. This could be used by other organizations to access your [place] medical records  [id number]        Your Vitals Were     Pulse Temperature BMI (Body Mass Index)             64 97.9  F (36.6  C) (Oral) 37.07 kg/m2          Blood Pressure from Last 3 Encounters:   02/15/18 118/60   01/18/18 144/62   01/16/18 130/60    Weight from Last 3 Encounters:   02/15/18 251 lb (113.9 kg)   01/18/18 253 lb 3.2 oz (114.9 kg)   01/16/18 251 lb (113.9 kg)              We Performed the Following     *UA reflex to Microscopic and Culture ([facility] (except [place] and [place])     Urine Microscopic     UROLOGY ADULT REFERRAL        Primary Care Provider Office Phone # Fax #    Jesus Cherry [phone] [address] [place] [address]        Equal Access to Services     KRAIG VOSS : Bon Donald, shanthi malloy, mitchell fuentes  lit de santiagolucretia poornimathee zarateaan ah. So [facility] [phone].    ATENCIÓN: Si radhala jose alberto, tiene a zapata disposición servicios gratuitos de asistencia lingüística. Bennett al [phone].    We comply with applicable federal civil rights laws and [place] laws. We do not discriminate on the basis of race, color, national origin, age, disability, sex, sexual orientation, or gender identity.            Thank you!     Thank you for choosing [facility]  for your care. Our goal is always to provide you with excellent care. Hearing back from our patients is one way we can continue to improve our services. Please take a few minutes to complete the written survey that you may receive in the mail after your visit with us. Thank you!             Your Updated Medication List - Protect others around you: Learn how to safely use, store and throw away your medicines at www.disposemymeds.org.          This list is accurate as of 2/15/18 11:36 AM.  Always use your most recent med list.                   Brand Name Dispense Instructions for use Diagnosis    aspirin 81 MG tablet     90 tablet    Take 1 tablet (81 mg) by mouth daily    Type 2 diabetes mellitus with diabetic nephropathy (H)       atorvastatin 20 MG tablet    LIPITOR    90 tablet    Take 1 tablet (20 mg) by mouth daily    Hyperlipidemia with target LDL less than 70       docusate sodium 100 MG capsule    COLACE     Take 100 mg by mouth daily as needed for constipation        lisinopril 10 MG tablet    PRINIVIL/ZESTRIL    90 tablet    Take 1 tablet (10 mg) by mouth daily    Essential hypertension with goal blood pressure less than 140/90       metFORMIN 1000 MG tablet    GLUCOPHAGE    180 tablet    Take 1 tablet (1,000 mg) by mouth 2 times daily (with meals)    Type 2 diabetes mellitus with hyperglycemia, without long-term current use of insulin (H)       metoprolol succinate 25 MG 24 hr tablet    TOPROL-XL    45 tablet    Take 0.5 tablets (12.5  mg) by mouth daily    Coronary artery disease involving native coronary artery of native heart without angina pectoris       nitroGLYcerin 0.4 MG sublingual tablet    NITROSTAT    30 tablet    Place 1 tablet (0.4 mg) under the tongue every 5 minutes as needed for chest pain    Coronary artery disease involving native coronary artery of native heart without angina pectoris       omeprazole 20 MG tablet      Take 20 mg by mouth daily.        * order for DME     1 Units    Accu-check glucometer or per insurance coverage    Type 2 diabetes mellitus without complication (H)       * order for DME     3 Month    Accu-check glucometer test lancets, or per insurance coverage Testing once daily    Type 2 diabetes mellitus without complication (H)       * order for DME     3 Month    Accu-check glucometer test strips or per insurance coverage Testing once daily    Type 2 diabetes mellitus without complication (H)       * order for DME     90 Units    Equipment being ordered: victoza pen needles    Type 2 diabetes mellitus with hyperglycemia, without long-term current use of insulin (H)       pioglitazone 15 MG tablet    ACTOS    90 tablet    Take 1 tablet (15 mg) by mouth daily    Type 2 diabetes mellitus with hyperglycemia, without long-term current use of insulin (H)       tamsulosin 0.4 MG capsule    FLOMAX    90 capsule    TAKE 1 CAPSULE EVERY DAY    Prostate cancer (H)       * Notice:  This list has 4 medication(s) that are the same as other medications prescribed for you. Read the directions carefully, and ask your doctor or other care provider to review them with you.

## 2018-02-15 NOTE — PATIENT INSTRUCTIONS
INSTRUCTIONS FOR TODAY:     schedule visit with Urology (likely cystoscopy)      Call if you have trouble getting a visit within the next week     Dr Cherry

## 2018-02-20 ENCOUNTER — OFFICE VISIT (OUTPATIENT)
Dept: UROLOGY | Facility: CLINIC | Age: 68
End: 2018-02-20
Payer: COMMERCIAL

## 2018-02-20 VITALS
BODY MASS INDEX: 37.03 KG/M2 | WEIGHT: 250 LBS | HEIGHT: 69 IN | DIASTOLIC BLOOD PRESSURE: 68 MMHG | SYSTOLIC BLOOD PRESSURE: 120 MMHG | HEART RATE: 80 BPM

## 2018-02-20 DIAGNOSIS — R31.0 GROSS HEMATURIA: Primary | ICD-10-CM

## 2018-02-20 LAB
ALBUMIN UR-MCNC: 30 MG/DL
APPEARANCE UR: CLEAR
BILIRUB UR QL STRIP: NEGATIVE
COLOR UR AUTO: YELLOW
GLUCOSE UR STRIP-MCNC: 500 MG/DL
HGB UR QL STRIP: NEGATIVE
KETONES UR STRIP-MCNC: NEGATIVE MG/DL
LEUKOCYTE ESTERASE UR QL STRIP: NEGATIVE
NITRATE UR QL: NEGATIVE
PH UR STRIP: 5 PH (ref 5–7)
SOURCE: ABNORMAL
SP GR UR STRIP: 1.01 (ref 1–1.03)
UROBILINOGEN UR STRIP-ACNC: 0.2 EU/DL (ref 0.2–1)

## 2018-02-20 PROCEDURE — 81003 URINALYSIS AUTO W/O SCOPE: CPT | Performed by: UROLOGY

## 2018-02-20 PROCEDURE — 52000 CYSTOURETHROSCOPY: CPT | Performed by: UROLOGY

## 2018-02-20 PROCEDURE — 99203 OFFICE O/P NEW LOW 30 MIN: CPT | Mod: 25 | Performed by: UROLOGY

## 2018-02-20 RX ORDER — CIPROFLOXACIN 500 MG/1
500 TABLET, FILM COATED ORAL ONCE
Qty: 1 TABLET | Refills: 0 | Status: SHIPPED | OUTPATIENT
Start: 2018-02-20 | End: 2018-02-20

## 2018-02-20 ASSESSMENT — PAIN SCALES - GENERAL: PAINLEVEL: NO PAIN (0)

## 2018-02-20 NOTE — MR AVS SNAPSHOT
"              After Visit Summary   2/20/2018    Héctor Quispe    MRN: [id number]           Patient Information     Date Of Birth          1950        Visit Information        Provider Department      2/20/2018 1:30 PM Herman Snider MD; [facility] [place]        Today's Diagnoses     Gross hematuria    -  1      Care Instructions         AFTER YOUR CYSTOSCOPY         You have just completed a cystoscopy, or \"cysto\", which allowed your physician to learn more about your bladder (or to remove a stent placed after surgery). We suggest that you continue to avoid caffeine, fruit juice, and alcohol for the next 24 hours, however, you are encouraged to return to your normal activities.       A few things that are considered normal after your cystoscopy:    * small amount of bleeding (or spotting) that clears within the next 24 hours    * slight burning sensation with urination    * sensation to of needing to avoid more frequently    * the feeling of \"air\" in your urine    * mild discomfort that is relieved with Tylonol        Please contact our office promptly if you:    * develop a fever above 101 degrees    * are unable to urinate    * develop bright red blood that does not stop    * severe pain or swelling        And of course, please contact our office with any concerns or questions [phone]      Maria Fernanda Low LPN              Follow-ups after your visit        Follow-up notes from your care team     Return for CT Abdo/Pelvis with/without, SEE ME AFTER.      Your next 10 appointments already scheduled     Feb 23, 2018  8:00 AM CST   LAB with EA LAB   [facility]    [address] [place] [address]  [address] [place] [id number] [phone]           Please do not eat 10-12 hours before your appointment if you are coming in fasting for labs on lipids, cholesterol, or glucose (sugar). This does not apply to pregnant women. " Water, hot tea and black coffee (with nothing added) are okay. Do not drink other fluids, diet soda or chew gum.            Feb 23, 2018  8:30 AM CST   Nurse Only with [place]UGUST[place] NURSE AB   [facility] [place] ([facility])    [address]  [address]  [place] [address]   [phone]            Mar 01, 2018 10:00 AM CST   SHORT with Jeannette Smith [facility] ([facility])    [address] [place] [address]  [address]  [place] [address]   [phone]            Apr 11, 2018  9:00 AM CDT   SHORT with Jesus Cherry MD   [facility] ([facility])    [address] [place] [address]  [address]  [place] [address]   [phone]            Apr 16, 2018 10:20 AM CDT   Return Visit with Herman Snider MD   [facility] [place] ([facility])    [address]  [address]  [facility] [address] [phone]              Future tests that were ordered for you today     Open Future Orders        Priority Expected Expires Ordered    CT Abdomen Pelvis w/o & w Contrast [ETL331] Routine  2/20/2019 2/20/2018            Who to contact     If you have questions or need follow up information about today's clinic visit or your schedule please contact [facility] [place] directly at [phone].  Normal or non-critical lab and imaging results will be communicated to you by MyChart, letter or phone within 4 business days after the clinic has received the results. If you do not hear from us within 7 days, please contact the clinic through [employer] or phone. If you have a critical or abnormal lab result, we will notify you by phone as soon as possible.  Submit refill requests through [employer] or call your pharmacy and they will forward the refill request to us. Please allow 3 business days for your refill to be completed.          Additional Information About  "Your Visit        [employer] Information     [employer] gives you secure access to your electronic health record. If you see a primary care provider, you can also send messages to your care team and make appointments. If you have questions, please call your primary care clinic.  If you do not have a primary care provider, please call [phone] and they will assist you.        Care EveryWhere ID     This is your Care EveryWhere ID. This could be used by other organizations to access your [place] medical records  [id number]        Your Vitals Were     Pulse Height BMI (Body Mass Index)             80 1.753 m (5' 9\") 36.92 kg/m2          Blood Pressure from Last 3 Encounters:   02/20/18 120/68   02/15/18 118/60   01/18/18 144/62    Weight from Last 3 Encounters:   02/20/18 113.4 kg (250 lb)   02/15/18 113.9 kg (251 lb)   01/18/18 114.9 kg (253 lb 3.2 oz)                 Today's Medication Changes          These changes are accurate as of 2/20/18  2:50 PM.  If you have any questions, ask your nurse or doctor.               Start taking these medicines.        Dose/Directions    ciprofloxacin 500 MG tablet   Commonly known as:  CIPRO   Used for:  Gross hematuria   Started by:  Herman Snider MD        Dose:  500 mg   Take 1 tablet (500 mg) by mouth once for 1 dose   Quantity:  1 tablet   Refills:  0         These medicines have changed or have updated prescriptions.        Dose/Directions    * order for DME   This may have changed:  Another medication with the same name was removed. Continue taking this medication, and follow the directions you see here.   Used for:  Type 2 diabetes mellitus without complication (H)   Changed by:  Herman Snider MD        Accu-check glucometer or per insurance coverage   Quantity:  1 Units   Refills:  0       * order for DME   This may have changed:  Another medication with the same name was removed. Continue taking this medication, and follow the directions you see here. "   Used for:  Type 2 diabetes mellitus without complication (H)   Changed by:  Herman Snider MD        Accu-check glucometer test lancets, or per insurance coverage Testing once daily   Quantity:  3 Month   Refills:  3       * order for DME   This may have changed:  Another medication with the same name was removed. Continue taking this medication, and follow the directions you see here.   Used for:  Type 2 diabetes mellitus without complication (H)   Changed by:  Herman Snider MD        Accu-check glucometer test strips or per insurance coverage Testing once daily   Quantity:  3 Month   Refills:  3       * Notice:  This list has 3 medication(s) that are the same as other medications prescribed for you. Read the directions carefully, and ask your doctor or other care provider to review them with you.         Where to get your medicines      These medications were sent to [facility] [place] - [place] - [address]  [address], [place] [address]     Phone:  [phone]     ciprofloxacin 500 MG tablet                Primary Care Provider Office Phone # Fax #    Jesus Cherry [phone]       [facility] [place] [address]        Equal Access to Services     [facility]: Hadii aad germain hadasho Soderek, waaxda luqadaha, qaybta kaalmada adelucretiayada, lit hsu . So [facility] [phone].    ATENCIÓN: Si habla español, tiene a zapata disposición servicios gratuitos de asistencia lingüística. Llame al [phone].    We comply with applicable federal civil rights laws and [place] laws. We do not discriminate on the basis of race, color, national origin, age, disability, sex, sexual orientation, or gender identity.            Thank you!     Thank you for choosing [facility] [place]  for your care. Our goal is always to provide you with excellent care. Hearing back from our patients is  one way we can continue to improve our services. Please take a few minutes to complete the written survey that you may receive in the mail after your visit with us. Thank you!             Your Updated Medication List - Protect others around you: Learn how to safely use, store and throw away your medicines at www.disposemymeds.org.          This list is accurate as of 2/20/18  2:50 PM.  Always use your most recent med list.                   Brand Name Dispense Instructions for use Diagnosis    aspirin 81 MG tablet     90 tablet    Take 1 tablet (81 mg) by mouth daily    Type 2 diabetes mellitus with diabetic nephropathy (H)       atorvastatin 20 MG tablet    LIPITOR    90 tablet    Take 1 tablet (20 mg) by mouth daily    Hyperlipidemia with target LDL less than 70       ciprofloxacin 500 MG tablet    CIPRO    1 tablet    Take 1 tablet (500 mg) by mouth once for 1 dose    Gross hematuria       docusate sodium 100 MG capsule    COLACE     Take 100 mg by mouth daily as needed for constipation        JANUVIA PO      Take 100 mg by mouth daily        lisinopril 10 MG tablet    PRINIVIL/ZESTRIL    90 tablet    Take 1 tablet (10 mg) by mouth daily    Essential hypertension with goal blood pressure less than 140/90       metFORMIN 1000 MG tablet    GLUCOPHAGE    180 tablet    Take 1 tablet (1,000 mg) by mouth 2 times daily (with meals)    Type 2 diabetes mellitus with hyperglycemia, without long-term current use of insulin (H)       metoprolol succinate 25 MG 24 hr tablet    TOPROL-XL    45 tablet    Take 0.5 tablets (12.5 mg) by mouth daily    Coronary artery disease involving native coronary artery of native heart without angina pectoris       nitroGLYcerin 0.4 MG sublingual tablet    NITROSTAT    30 tablet    Place 1 tablet (0.4 mg) under the tongue every 5 minutes as needed for chest pain    Coronary artery disease involving native coronary artery of native heart without angina pectoris       omeprazole 20 MG tablet       Take 20 mg by mouth daily.        * order for DME     1 Units    Accu-check glucometer or per insurance coverage    Type 2 diabetes mellitus without complication (H)       * order for DME     3 Month    Accu-check glucometer test lancets, or per insurance coverage Testing once daily    Type 2 diabetes mellitus without complication (H)       * order for DME     3 Month    Accu-check glucometer test strips or per insurance coverage Testing once daily    Type 2 diabetes mellitus without complication (H)       pioglitazone 15 MG tablet    ACTOS    90 tablet    Take 1 tablet (15 mg) by mouth daily    Type 2 diabetes mellitus with hyperglycemia, without long-term current use of insulin (H)       tamsulosin 0.4 MG capsule    FLOMAX    90 capsule    TAKE 1 CAPSULE EVERY DAY    Prostate cancer (H)       * Notice:  This list has 3 medication(s) that are the same as other medications prescribed for you. Read the directions carefully, and ask your doctor or other care provider to review them with you.

## 2018-02-20 NOTE — PATIENT INSTRUCTIONS
"     AFTER YOUR CYSTOSCOPY         You have just completed a cystoscopy, or \"cysto\", which allowed your physician to learn more about your bladder (or to remove a stent placed after surgery). We suggest that you continue to avoid caffeine, fruit juice, and alcohol for the next 24 hours, however, you are encouraged to return to your normal activities.       A few things that are considered normal after your cystoscopy:    * small amount of bleeding (or spotting) that clears within the next 24 hours    * slight burning sensation with urination    * sensation to of needing to avoid more frequently    * the feeling of \"air\" in your urine    * mild discomfort that is relieved with Tylonol        Please contact our office promptly if you:    * develop a fever above 101 degrees    * are unable to urinate    * develop bright red blood that does not stop    * severe pain or swelling        And of course, please contact our office with any concerns or questions [phone]      Maria Fernanda Low LPN        AFTER YOUR CYSTOSCOPY        You have just completed a cystoscopy, or \"cysto\", which allowed your physician to learn more about your bladder (or to remove a stent placed after surgery). We suggest that you continue to avoid caffeine, fruit juice, and alcohol for the next 24 hours, however, you are encouraged to return to your normal activities.         A few things that are considered normal after your cystoscopy:     * Small amount of bleeding (or spotting) that clears within the next 24 hours     * Slight burning sensation with urination     * Sensation to of needing to avoid more frequently     * The feeling of \"air\" in your urine     * Mild discomfort that is relieved with Tylenol        Please contact our office promptly if you:     * Develop a fever above 101 degrees     * Are unable to urinate     * Develop bright red blood that does not stop     * Severe pain or swelling         Please contact our office with any " concerns or questions @[facility].

## 2018-02-20 NOTE — PROGRESS NOTES
History: It is a pleasure to see this very pleasant 67-year-old gentleman in initial consultation today at the request of Dr Ward.  He had a recent episode of gross painless hematuria.  He's had no other major related symptoms.  He has a satisfactory urine stream without evidence of dysuria or incontinence and has no recent history of urinary tract infection.  We should note that in 2010 was diagnosed with prostate cancer after his PSA had risen to 5.94 biopsies of the prostate reported positive biopsies on the right side with a Scandia score 6 with one from the right apex with a Scandia score 7.  Subsequently he was treated by brachytherapy with external beam boost.  Apparently his PSA has been negligible ever since that he recently did develop gross hematuria.  General health is otherwise outlined below.      Past Medical History:   Diagnosis Date     Coronary artery disease involving native coronary artery of native heart without angina pectoris 1/6/2016     Essential hypertension 1/6/2016     Type 2 diabetes mellitus with diabetic nephropathy (H) 1/6/2016       Past Surgical History:   Procedure Laterality Date     CYSTOSCOPY       ENT SURGERY       prostate       PROSTATE SURGERY       STENT, CORONARY, LUIS M      h/o coronary stent x 2       Family History   Problem Relation Age of Onset     Alzheimer Disease Mother      CANCER Father      DIABETES No family hx of      Coronary Artery Disease No family hx of      Hypertension No family hx of      Hyperlipidemia No family hx of      CEREBROVASCULAR DISEASE No family hx of      Breast Cancer No family hx of      Colon Cancer No family hx of      Prostate Cancer No family hx of      Other Cancer No family hx of      Depression No family hx of      Anxiety Disorder No family hx of      MENTAL ILLNESS No family hx of      Substance Abuse No family hx of      Anesthesia Reaction No family hx of      Asthma No family hx of      OSTEOPOROSIS No family hx of      Genetic  Disorder No family hx of      Thyroid Disease No family hx of      Obesity No family hx of      Unknown/Adopted No family hx of        Social History     Social History     Marital status:      Spouse name: N/A     Number of children: N/A     Years of education: N/A     Occupational History     Not on file.     Social History Main Topics     Smoking status: Former Smoker     Types: Cigarettes     Quit date: 8/2/2002     Smokeless tobacco: Never Used     Alcohol use 1.8 oz/week     3 Cans of beer per week     Drug use: No     Sexual activity: Yes     Partners: Female     Other Topics Concern     Parent/Sibling W/ Cabg, Mi Or Angioplasty Before 65f 55m? No     Social History Narrative       Current Outpatient Prescriptions   Medication Sig Dispense Refill     SITagliptin Phosphate (JANUVIA PO) Take 100 mg by mouth daily       docusate sodium (COLACE) 100 MG capsule Take 100 mg by mouth daily as needed for constipation       pioglitazone (ACTOS) 15 MG tablet Take 1 tablet (15 mg) by mouth daily 90 tablet 1     atorvastatin (LIPITOR) 20 MG tablet Take 1 tablet (20 mg) by mouth daily 90 tablet 3     lisinopril (PRINIVIL/ZESTRIL) 10 MG tablet Take 1 tablet (10 mg) by mouth daily 90 tablet 3     metFORMIN (GLUCOPHAGE) 1000 MG tablet Take 1 tablet (1,000 mg) by mouth 2 times daily (with meals) 180 tablet 1     metoprolol succinate (TOPROL-XL) 25 MG 24 hr tablet Take 0.5 tablets (12.5 mg) by mouth daily 45 tablet 3     tamsulosin (FLOMAX) 0.4 MG capsule TAKE 1 CAPSULE EVERY DAY 90 capsule 3     nitroglycerin (NITROSTAT) 0.4 MG SL tablet Place 1 tablet (0.4 mg) under the tongue every 5 minutes as needed for chest pain 30 tablet 6     aspirin 81 MG tablet Take 1 tablet (81 mg) by mouth daily 90 tablet 11     order for DME Accu-check glucometer or per insurance coverage 1 Units 0     order for DME Accu-check glucometer test lancets, or per insurance coverage  Testing once daily 3 Month 3     order for DME Accu-check  "glucometer test strips or per insurance coverage  Testing once daily 3 Month 3     omeprazole 20 MG tablet Take 20 mg by mouth daily.       ciprofloxacin (CIPRO) 500 MG tablet Take 1 tablet (500 mg) by mouth once for 1 dose 1 tablet 0       Review Of Systems:  Skin: negative  Eyes: negative  Ears/Nose/Throat: negative  Respiratory: No shortness of breath, dyspnea on exertion, cough, or hemoptysis  Cardiovascular: Essential hypertension  Gastrointestinal: negative  Genitourinary: hematuria.  History of prostate cancer  Musculoskeletal: neck pain  Neurologic: negative  Psychiatric: negative  Hematologic/Lymphatic/Immunologic: negative  Endocrine: diabetes    Exam:  /68 (BP Location: Right arm, Patient Position: Chair, Cuff Size: Adult Regular)  Pulse 80  Ht 1.753 m (5' 9\")  Wt 113.4 kg (250 lb)  BMI 36.92 kg/m2    General Impression: Very pleasant gentleman in no acute distress, well-oriented in time place and person.    Mental status.  Normal    HEENT: There is no clinical evidence of jaundice.  Mucous membranes are normal    Skin: Skin is otherwise normal to examination    Lymph Nodes: There is no inguinal lymphadenopathy    Respiratory System: The respiratory cycle is normal    Cardiovascular: Not examined    Abdominal: Not examined    Extremities: There is no significant peripheral edema    Back and Flank: Not examined    Genital: Uncircumcised,.  He does have a distal hypospadias.  There are no other remarkable features and the genitalia    Rectal: Not examined    Neurologic:  There are no focal abnormal clinical neurologic signs and several, or peripheral nervous systems    Impression:    Procedure.  Cystoscopy.   Surgeon.    Tylor  Anesthesia.  Local anesthesia.  Description.  With the patient in the supine position, with the genital area prepped and draped in the customary fashion, with local anesthetic and the urethra, the flexible cystoscope was Inserted.  The penile urethra apart from a distal " "hypospadias was otherwise unremarkable.  The external sphincter is intact.  The prostate urethra is open with some mild telangiectasia on the bladder neck to.  The interior the bladder is carefully inspected.  There is no significant trabeculation.  There is no evidence of neoplasm or stone in the bladder.  There are no other remarkable features.    The findings would indicate so far that the most likely cause for gross hematuria would be these 2 telangiectasia on the bladder neck which would be most likely secondary to radiation therapy.  Certainly I see no evidence of malignancy in the bladder.  We will need to arrange for CT scan of the abdomen and pelvis to complete her investigations.  I will then have further discussions with the patient to finalize our investigations and summarize the findings and any plans for further investigation as may be necessary.  I did discuss the entire situation with the patient in detail today.  I answered all his  questions    Plan: CT scan abdomen and pelvis with and without contrast and urine cytology and see me after    Time: New patient considerable review of records required due to lengthy prior history of prostate cancer in addition to cystoscopy    \"This dictation was performed with voice recognition software and may contain errors,  omissions and inadvertent word substitution.\"    "

## 2018-02-20 NOTE — NURSING NOTE
Prior to the start of the procedure and with procedural staff participation, I verbally confirmed the patient s identity using two indicators, relevant allergies, that the procedure was appropriate and matched the consent or emergent situation, and that the correct equipment/implants were available. Immediately prior to starting the procedure I conducted the Time Out with the procedural staff and re-confirmed the patient s name, procedure, and site/side. (The Joint Commission universal protocol was followed.)  yes    Sedation (Moderate or Deep): none    Maria Fernanda Low LPN

## 2018-02-20 NOTE — LETTER
2/20/2018       RE: Héctor Quispe  [address] [place] [place] [address]     Dear Colleague,    Thank you for referring your patient, Héctor Quispe, to the [facility] [place] at [facility]. Please see a copy of my visit note below.    History: It is a pleasure to see this very pleasant 67-year-old gentleman in initial consultation today at the request of Dr Ward.  He had a recent episode of gross painless hematuria.  He's had no other major related symptoms.  He has a satisfactory urine stream without evidence of dysuria or incontinence and has no recent history of urinary tract infection.  We should note that in 2010 was diagnosed with prostate cancer after his PSA had risen to 5.94 biopsies of the prostate reported positive biopsies on the right side with a Jefferson score 6 with one from the right apex with a Jefferson score 7.  Subsequently he was treated by brachytherapy with external beam boost.  Apparently his PSA has been negligible ever since that he recently did develop gross hematuria.  General health is otherwise outlined below.      Past Medical History:   Diagnosis Date     Coronary artery disease involving native coronary artery of native heart without angina pectoris 1/6/2016     Essential hypertension 1/6/2016     Type 2 diabetes mellitus with diabetic nephropathy (H) 1/6/2016       Past Surgical History:   Procedure Laterality Date     CYSTOSCOPY       ENT SURGERY       prostate       PROSTATE SURGERY       STENT, CORONARY, LUIS M      h/o coronary stent x 2       Family History   Problem Relation Age of Onset     Alzheimer Disease Mother      CANCER Father      DIABETES No family hx of      Coronary Artery Disease No family hx of      Hypertension No family hx of      Hyperlipidemia No family hx of      CEREBROVASCULAR DISEASE No family hx of      Breast Cancer No family hx of      Colon Cancer No family hx of      Prostate Cancer  No family hx of      Other Cancer No family hx of      Depression No family hx of      Anxiety Disorder No family hx of      MENTAL ILLNESS No family hx of      Substance Abuse No family hx of      Anesthesia Reaction No family hx of      Asthma No family hx of      OSTEOPOROSIS No family hx of      Genetic Disorder No family hx of      Thyroid Disease No family hx of      Obesity No family hx of      Unknown/Adopted No family hx of        Social History     Social History     Marital status:      Spouse name: N/A     Number of children: N/A     Years of education: N/A     Occupational History     Not on file.     Social History Main Topics     Smoking status: Former Smoker     Types: Cigarettes     Quit date: 8/2/2002     Smokeless tobacco: Never Used     Alcohol use 1.8 oz/week     3 Cans of beer per week     Drug use: No     Sexual activity: Yes     Partners: Female     Other Topics Concern     Parent/Sibling W/ Cabg, Mi Or Angioplasty Before 65f 55m? No     Social History Narrative       Current Outpatient Prescriptions   Medication Sig Dispense Refill     SITagliptin Phosphate (JANUVIA PO) Take 100 mg by mouth daily       docusate sodium (COLACE) 100 MG capsule Take 100 mg by mouth daily as needed for constipation       pioglitazone (ACTOS) 15 MG tablet Take 1 tablet (15 mg) by mouth daily 90 tablet 1     atorvastatin (LIPITOR) 20 MG tablet Take 1 tablet (20 mg) by mouth daily 90 tablet 3     lisinopril (PRINIVIL/ZESTRIL) 10 MG tablet Take 1 tablet (10 mg) by mouth daily 90 tablet 3     metFORMIN (GLUCOPHAGE) 1000 MG tablet Take 1 tablet (1,000 mg) by mouth 2 times daily (with meals) 180 tablet 1     metoprolol succinate (TOPROL-XL) 25 MG 24 hr tablet Take 0.5 tablets (12.5 mg) by mouth daily 45 tablet 3     tamsulosin (FLOMAX) 0.4 MG capsule TAKE 1 CAPSULE EVERY DAY 90 capsule 3     nitroglycerin (NITROSTAT) 0.4 MG SL tablet Place 1 tablet (0.4 mg) under the tongue every 5 minutes as needed for chest  "pain 30 tablet 6     aspirin 81 MG tablet Take 1 tablet (81 mg) by mouth daily 90 tablet 11     order for DME Accu-check glucometer or per insurance coverage 1 Units 0     order for DME Accu-check glucometer test lancets, or per insurance coverage  Testing once daily 3 Month 3     order for DME Accu-check glucometer test strips or per insurance coverage  Testing once daily 3 Month 3     omeprazole 20 MG tablet Take 20 mg by mouth daily.       ciprofloxacin (CIPRO) 500 MG tablet Take 1 tablet (500 mg) by mouth once for 1 dose 1 tablet 0       Review Of Systems:  Skin: negative  Eyes: negative  Ears/Nose/Throat: negative  Respiratory: No shortness of breath, dyspnea on exertion, cough, or hemoptysis  Cardiovascular: Essential hypertension  Gastrointestinal: negative  Genitourinary: hematuria.  History of prostate cancer  Musculoskeletal: neck pain  Neurologic: negative  Psychiatric: negative  Hematologic/Lymphatic/Immunologic: negative  Endocrine: diabetes    Exam:  /68 (BP Location: Right arm, Patient Position: Chair, Cuff Size: Adult Regular)  Pulse 80  Ht 1.753 m (5' 9\")  Wt 113.4 kg (250 lb)  BMI 36.92 kg/m2    General Impression: Very pleasant gentleman in no acute distress, well-oriented in time place and person.    Mental status.  Normal    HEENT: There is no clinical evidence of jaundice.  Mucous membranes are normal    Skin: Skin is otherwise normal to examination    Lymph Nodes: There is no inguinal lymphadenopathy    Respiratory System: The respiratory cycle is normal    Cardiovascular: Not examined    Abdominal: Not examined    Extremities: There is no significant peripheral edema    Back and Flank: Not examined    Genital: Uncircumcised,.  He does have a distal hypospadias.  There are no other remarkable features and the genitalia    Rectal: Not examined    Neurologic:  There are no focal abnormal clinical neurologic signs and several, or peripheral nervous systems    Impression:    Procedure.  " Cystoscopy.   Surgeon.    Tylor  Anesthesia.  Local anesthesia.  Description.  With the patient in the supine position, with the genital area prepped and draped in the customary fashion, with local anesthetic and the urethra, the flexible cystoscope was Inserted.  The penile urethra apart from a distal hypospadias was otherwise unremarkable.  The external sphincter is intact.  The prostate urethra is open with some mild telangiectasia on the bladder neck to.  The interior the bladder is carefully inspected.  There is no significant trabeculation.  There is no evidence of neoplasm or stone in the bladder.  There are no other remarkable features.    The findings would indicate so far that the most likely cause for gross hematuria would be these 2 telangiectasia on the bladder neck which would be most likely secondary to radiation therapy.  Certainly I see no evidence of malignancy in the bladder.  We will need to arrange for CT scan of the abdomen and pelvis to complete her investigations.  I will then have further discussions with the patient to finalize our investigations and summarize the findings and any plans for further investigation as may be necessary.  I did discuss the entire situation with the patient in detail today.  I answered all his  questions    Plan: CT scan abdomen and pelvis with and without contrast and urine cytology and see me after    Time: New patient considerable review of records required due to lengthy prior history of prostate cancer in addition to cystoscopy    Herman Snider MD

## 2018-02-20 NOTE — LETTER
2/20/2018      RE: Héctor PEREZ Jose Enrique  [address] [place] [address]       History: It is a pleasure to see this very pleasant 67-year-old gentleman in initial consultation today at the request of Dr Ward.  He had a recent episode of gross painless hematuria.  He's had no other major related symptoms.  He has a satisfactory urine stream without evidence of dysuria or incontinence and has no recent history of urinary tract infection.  We should note that in 2010 was diagnosed with prostate cancer after his PSA had risen to 5.94 biopsies of the prostate reported positive biopsies on the right side with a [place] score 6 with one from the right apex with a Kang score 7.  Subsequently he was treated by brachytherapy with external beam boost.  Apparently his PSA has been negligible ever since that he recently did develop gross hematuria.  General health is otherwise outlined below.      Past Medical History:   Diagnosis Date     Coronary artery disease involving native coronary artery of native heart without angina pectoris 1/6/2016     Essential hypertension 1/6/2016     Type 2 diabetes mellitus with diabetic nephropathy (H) 1/6/2016       Past Surgical History:   Procedure Laterality Date     CYSTOSCOPY       ENT SURGERY       prostate       PROSTATE SURGERY       STENT, CORONARY, LUIS M      h/o coronary stent x 2       Family History   Problem Relation Age of Onset     Alzheimer Disease Mother      CANCER Father      DIABETES No family hx of      Coronary Artery Disease No family hx of      Hypertension No family hx of      Hyperlipidemia No family hx of      CEREBROVASCULAR DISEASE No family hx of      Breast Cancer No family hx of      Colon Cancer No family hx of      Prostate Cancer No family hx of      Other Cancer No family hx of      Depression No family hx of      Anxiety Disorder No family hx of      MENTAL ILLNESS No family hx of      Substance Abuse No family hx of      Anesthesia Reaction No family  hx of      Asthma No family hx of      OSTEOPOROSIS No family hx of      Genetic Disorder No family hx of      Thyroid Disease No family hx of      Obesity No family hx of      Unknown/Adopted No family hx of        Social History     Social History     Marital status:      Spouse name: N/A     Number of children: N/A     Years of education: N/A     Occupational History     Not on file.     Social History Main Topics     Smoking status: Former Smoker     Types: Cigarettes     Quit date: 8/2/2002     Smokeless tobacco: Never Used     Alcohol use 1.8 oz/week     3 Cans of beer per week     Drug use: No     Sexual activity: Yes     Partners: Female     Other Topics Concern     Parent/Sibling W/ Cabg, Mi Or Angioplasty Before 65f 55m? No     Social History Narrative       Current Outpatient Prescriptions   Medication Sig Dispense Refill     SITagliptin Phosphate (JANUVIA PO) Take 100 mg by mouth daily       docusate sodium (COLACE) 100 MG capsule Take 100 mg by mouth daily as needed for constipation       pioglitazone (ACTOS) 15 MG tablet Take 1 tablet (15 mg) by mouth daily 90 tablet 1     atorvastatin (LIPITOR) 20 MG tablet Take 1 tablet (20 mg) by mouth daily 90 tablet 3     lisinopril (PRINIVIL/ZESTRIL) 10 MG tablet Take 1 tablet (10 mg) by mouth daily 90 tablet 3     metFORMIN (GLUCOPHAGE) 1000 MG tablet Take 1 tablet (1,000 mg) by mouth 2 times daily (with meals) 180 tablet 1     metoprolol succinate (TOPROL-XL) 25 MG 24 hr tablet Take 0.5 tablets (12.5 mg) by mouth daily 45 tablet 3     tamsulosin (FLOMAX) 0.4 MG capsule TAKE 1 CAPSULE EVERY DAY 90 capsule 3     nitroglycerin (NITROSTAT) 0.4 MG SL tablet Place 1 tablet (0.4 mg) under the tongue every 5 minutes as needed for chest pain 30 tablet 6     aspirin 81 MG tablet Take 1 tablet (81 mg) by mouth daily 90 tablet 11     order for DME Accu-check glucometer or per insurance coverage 1 Units 0     order for DME Accu-check glucometer test lancets, or per  "insurance coverage  Testing once daily 3 Month 3     order for DME Accu-check glucometer test strips or per insurance coverage  Testing once daily 3 Month 3     omeprazole 20 MG tablet Take 20 mg by mouth daily.       ciprofloxacin (CIPRO) 500 MG tablet Take 1 tablet (500 mg) by mouth once for 1 dose 1 tablet 0       Review Of Systems:  Skin: negative  Eyes: negative  Ears/Nose/Throat: negative  Respiratory: No shortness of breath, dyspnea on exertion, cough, or hemoptysis  Cardiovascular: Essential hypertension  Gastrointestinal: negative  Genitourinary: hematuria.  History of prostate cancer  Musculoskeletal: neck pain  Neurologic: negative  Psychiatric: negative  Hematologic/Lymphatic/Immunologic: negative  Endocrine: diabetes    Exam:  /68 (BP Location: Right arm, Patient Position: Chair, Cuff Size: Adult Regular)  Pulse 80  Ht 1.753 m (5' 9\")  Wt 113.4 kg (250 lb)  BMI 36.92 kg/m2    General Impression: Very pleasant gentleman in no acute distress, well-oriented in time place and person.    Mental status.  Normal    HEENT: There is no clinical evidence of jaundice.  Mucous membranes are normal    Skin: Skin is otherwise normal to examination    Lymph Nodes: There is no inguinal lymphadenopathy    Respiratory System: The respiratory cycle is normal    Cardiovascular: Not examined    Abdominal: Not examined    Extremities: There is no significant peripheral edema    Back and Flank: Not examined    Genital: Uncircumcised,.  He does have a distal hypospadias.  There are no other remarkable features and the genitalia    Rectal: Not examined    Neurologic:  There are no focal abnormal clinical neurologic signs and several, or peripheral nervous systems    Impression:    Procedure.  Cystoscopy.   Surgeon.    Tylor  Anesthesia.  Local anesthesia.  Description.  With the patient in the supine position, with the genital area prepped and draped in the customary fashion, with local anesthetic and the urethra, " the flexible cystoscope was Inserted.  The penile urethra apart from a distal hypospadias was otherwise unremarkable.  The external sphincter is intact.  The prostate urethra is open with some mild telangiectasia on the bladder neck to.  The interior the bladder is carefully inspected.  There is no significant trabeculation.  There is no evidence of neoplasm or stone in the bladder.  There are no other remarkable features.    The findings would indicate so far that the most likely cause for gross hematuria would be these 2 telangiectasia on the bladder neck which would be most likely secondary to radiation therapy.  Certainly I see no evidence of malignancy in the bladder.  We will need to arrange for CT scan of the abdomen and pelvis to complete her investigations.  I will then have further discussions with the patient to finalize our investigations and summarize the findings and any plans for further investigation as may be necessary.  I did discuss the entire situation with the patient in detail today.  I answered all his  questions    Plan: CT scan abdomen and pelvis with and without contrast and urine cytology and see me after    Time: New patient considerable review of records required due to lengthy prior history of prostate cancer in addition to cystoscopy  Herman Snider MD

## 2018-02-22 ENCOUNTER — HOSPITAL ENCOUNTER (OUTPATIENT)
Dept: CT IMAGING | Facility: CLINIC | Age: 68
Discharge: HOME | End: 2018-02-22
Attending: UROLOGY | Admitting: UROLOGY
Payer: MEDICARE

## 2018-02-22 DIAGNOSIS — R31.0 GROSS HEMATURIA: ICD-10-CM

## 2018-02-22 LAB
CREAT BLD-MCNC: 1 MG/DL (ref 0.66–1.25)
GFR SERPL CREATININE-BSD FRML MDRD: 74 ML/MIN/1.7M2

## 2018-02-22 PROCEDURE — 25000128 H RX IP 250 OP 636: Performed by: RADIOLOGY

## 2018-02-22 PROCEDURE — 82565 ASSAY OF CREATININE: CPT

## 2018-02-22 PROCEDURE — 74178 CT ABD&PLV WO CNTR FLWD CNTR: CPT

## 2018-02-22 RX ORDER — IOPAMIDOL 755 MG/ML
500 INJECTION, SOLUTION INTRAVASCULAR ONCE
Status: COMPLETED | OUTPATIENT
Start: 2018-02-22 | End: 2018-02-22

## 2018-02-22 RX ADMIN — IOPAMIDOL 100 ML: 755 INJECTION, SOLUTION INTRAVENOUS at 08:18

## 2018-02-22 RX ADMIN — SODIUM CHLORIDE 65 ML: 9 INJECTION, SOLUTION INTRAVENOUS at 08:18

## 2018-02-23 ENCOUNTER — APPOINTMENT (OUTPATIENT)
Dept: LAB | Facility: CLINIC | Age: 68
End: 2018-02-23
Payer: COMMERCIAL

## 2018-03-01 ENCOUNTER — OFFICE VISIT (OUTPATIENT)
Dept: PHARMACY | Facility: CLINIC | Age: 68
End: 2018-03-01
Payer: COMMERCIAL

## 2018-03-01 VITALS
HEART RATE: 78 BPM | SYSTOLIC BLOOD PRESSURE: 120 MMHG | DIASTOLIC BLOOD PRESSURE: 58 MMHG | BODY MASS INDEX: 36.39 KG/M2 | WEIGHT: 246.4 LBS

## 2018-03-01 DIAGNOSIS — Z23 ENCOUNTER FOR IMMUNIZATION: ICD-10-CM

## 2018-03-01 DIAGNOSIS — E11.65 TYPE 2 DIABETES MELLITUS WITH HYPERGLYCEMIA, WITHOUT LONG-TERM CURRENT USE OF INSULIN (H): Primary | ICD-10-CM

## 2018-03-01 DIAGNOSIS — I10 ESSENTIAL HYPERTENSION WITH GOAL BLOOD PRESSURE LESS THAN 140/90: ICD-10-CM

## 2018-03-01 PROCEDURE — 99605 MTMS BY PHARM NP 15 MIN: CPT | Performed by: PHARMACIST

## 2018-03-01 PROCEDURE — 99607 MTMS BY PHARM ADDL 15 MIN: CPT | Performed by: PHARMACIST

## 2018-03-01 NOTE — PROGRESS NOTES
SUBJECTIVE/OBJECTIVE:                Héctor Quispe is a 67 year old male coming in for a follow-up visit for Medication Therapy Management.  He was referred to me from Dr. Cherry.     Chief Complaint: Follow up from our visit on 1/18/18.  Diabetes. Is leaving to go down to [place] with wife next week for 1 month.     Tobacco: No tobacco use  Alcohol: none during weekdays, will drink     Medication Adherence/Access: Patient has no concerns at this time.     Diabetes:  Pt currently taking metformin 1000 mg BID, Januvia 100 mg daily, pioglitazone 15 mg daily. Glipizide stopped last encounter - has not been having lows anymore. Noticing that hunger is less. Feels like he has lost some weight since discontinuing the glipizide and lowering pioglitazone. Pt is not experiencing side effects.  SMBG: sometimes Ranges (patient reported):   AM fasting 140-150 at highest  2 hours post prandial: 170-180  Patient is not experiencing hypoglycemia  Recent symptoms of high blood sugar? none  Eye exam: up to date  Foot exam: up to date  Microalbumin is not < 30 mg/g. Pt is taking an ACEi/ARB.  Aspirin: Taking 81mg daily and denies side effects  Diet: Next goal is to lose weight - he has been eating healthier but thinks he can do better. Since wife has lost 100+ lb, she is a good support system.   Exercise: Has been working out more recently, has been playing pickle ball with wife. Been getting on the treadmill every day.     Lab Results   Component Value Date    A1C 6.3 01/16/2018    A1C 6.1 07/21/2017    A1C 6.3 02/28/2017    A1C 6.2 09/13/2016    A1C 6.2 01/06/2016     Wt Readings from Last 4 Encounters:   03/01/18 246 lb 6.4 oz (111.8 kg)   02/20/18 250 lb (113.4 kg)   02/15/18 251 lb (113.9 kg)   01/18/18 253 lb 3.2 oz (114.9 kg)     Hypertension: Current medications include lisinopril 10 mg daily, metoprolol XL 12.5 mg once daily.  Patient does not self-monitor BP.  Patient reports no current medication side  effects.    Immunizations: Patient has gotten influenza vaccination. Completed Zostavax 12/4/12.     Current labs include:  Today's Vitals: /58 (BP Location: Right arm, Patient Position: Right side)  Pulse 78  Wt 246 lb 6.4 oz (111.8 kg)  BMI 36.39 kg/m2  BP Readings from Last 3 Encounters:   02/20/18 120/68   02/15/18 118/60   01/18/18 144/62     Lab Results   Component Value Date    A1C 6.3 01/16/2018   .  Lab Results   Component Value Date    CHOL 128 01/16/2018     Lab Results   Component Value Date    TRIG 83 01/16/2018     Lab Results   Component Value Date    HDL 78 01/16/2018     Lab Results   Component Value Date    LDL 33 01/16/2018       Liver Function Studies -   Recent Labs   Lab Test  01/16/18   0907   PROTTOTAL  6.9   ALBUMIN  4.0   BILITOTAL  0.3   ALKPHOS  54   AST  17   ALT  19       Lab Results   Component Value Date    UCRR 143 02/28/2017    MICROL 266 02/28/2017    UMALCR 186.01 (H) 02/28/2017       Last Basic Metabolic Panel:  Lab Results   Component Value Date     01/16/2018      Lab Results   Component Value Date    POTASSIUM 4.5 01/16/2018     Lab Results   Component Value Date    CHLORIDE 106 01/16/2018     Lab Results   Component Value Date    BUN 19 01/16/2018     Lab Results   Component Value Date    CR 1.12 01/16/2018     GFR Estimate   Date Value Ref Range Status   02/22/2018 74 >60 mL/min/1.7m2 Final   01/16/2018 65 >60 mL/min/1.7m2 Final     Comment:     Non  GFR Calc   02/28/2017 84 >60 mL/min/1.7m2 Final     Comment:     Non  GFR Calc     GFR Estimate If Black   Date Value Ref Range Status   02/22/2018 90 >60 mL/min/1.7m2 Final   01/16/2018 79 >60 mL/min/1.7m2 Final     Comment:      GFR Calc   02/28/2017 >90   GFR Calc   >60 mL/min/1.7m2 Final     TSH   Date Value Ref Range Status   09/13/2016 2.13 0.40 - 4.00 mU/L Final   ]    Most Recent Immunizations   Administered Date(s) Administered     Influenza  (High Dose) 3 valent vaccine 09/13/2016     Influenza (IIV3) PF 12/04/2012     Pneumo Conj 13-V (2010&after) 09/13/2016     Pneumococcal 23 valent 12/04/2012     TD (ADULT, 7+) 10/28/2005     TDAP Vaccine (Adacel) 12/16/2013     Zoster vaccine, live 01/04/2013       ASSESSMENT:              Current medications were reviewed today as discussed above.      Medication Adherence: good, no issues identified    Diabetes: Stable. Patient is meeting A1c goal of < 7%. A1c will be due at next appt with Dr. Cherry. Patient would benefit from continuing to test blood sugar in morning before breakfast and sometimes 2 hours after starting dinner and shoot for 60 grams of carbohydrates for meals and 15-30 grams of carbohydrates with snacks. Encouraged his goal of weight loss and managing diet better, patient to continue exercise.     Hypertension: Stable.     Immunizations: Needs improvement. Patient would benefit from receiving Shingrix vaccine once it has come into pharmacy.      PLAN:                  1. Continue testing blood sugar in morning before breakfast and sometimes 2 hours after starting dinner.     2. Shoot for 60 grams of carbohydrates for meals and 15-30 grams of carbohydrates with snacks.     2. Can get Shingrix vaccine.     I spent 30 minutes with this patient today. I offer these suggestions for consideration by the PCP. A copy of the visit note was provided to the patient's primary care provider.     Will follow up in 3 months. Patient is following up with Dr. Cherry in 1 month.     The patient was given a summary of these recommendations as an after visit summary.    Kaci Salomon PharmD  Pharmaceutical Care Resident   Pager: [phone]    I agree with the resident note and plan of care.      Jeannette Smith PharmD [facility]  Medication Therapy Management Provider  Pager #[phone]

## 2018-03-01 NOTE — Clinical Note
Hi Dr. Cherry,   Please see note as FYI regarding MTM visit with Andrew. Thank you!   Kaci Salomon, PharmD Pharmaceutical Care Resident  Pager: [phone]

## 2018-03-01 NOTE — MR AVS SNAPSHOT
After Visit Summary   3/1/2018    Héctor Quispe    MRN: [id number]           Patient Information     Date Of Birth          1950        Visit Information        Provider Department      3/1/2018 10:00 AM Jeannetet Smith, [facility]        Care Instructions    Recommendations from today's MTM visit:                                                        1. Continue testing blood sugar in morning before breakfast and sometimes 2 hours after starting dinner.     2. Shoot for 60 grams of carbohydrates for meals and 15-30 grams of carbohydrates with snacks.     2. Can get the shingrix vaccination at your local pharmacy.     4. Will plan on getting A1c when you get back from trip.     Next MTM visit: 3 months     To schedule another MTM appointment, please call the clinic directly or you may call the MTM scheduling line at [phone] or toll-free at [phone].     My Clinical Pharmacist's contact information:                                                      It was a pleasure seeing you today!  Please feel free to contact me with any questions or concerns you have.      Kaci Salomon, PharmD  Pharmaceutical Care Resident   Pager: [phone]    You may receive a survey about the MTM services you received.  I would appreciate your feedback to help me serve you better in the future. Please fill it out and return it when you can. Your comments will be anonymous.                    Follow-ups after your visit        Your next 10 appointments already scheduled     Apr 11, 2018  9:00 AM CDT   [facility]    [address]  [address]  [facility] [address] [phone]            Apr 16, 2018 10:20 AM CDT   Return Visit with Herman Snider MD   [facility] ([facility])    [address]  [address]  [facility] [address]    [phone]            Jun 07, 2018 10:00 AM CDT   SHORT with Jeannette Smith [facility] [place] ([facility])    [address]  [address]  [place] [address] [phone]              Who to contact     If you have questions or need follow up information about today's clinic visit or your schedule please contact [facility] directly at [phone].  Normal or non-critical lab and imaging results will be communicated to you by PurpleTealhart, letter or phone within 4 business days after the clinic has received the results. If you do not hear from us within 7 days, please contact the clinic through PurpleTealhart or phone. If you have a critical or abnormal lab result, we will notify you by phone as soon as possible.  Submit refill requests through [facility] or call your pharmacy and they will forward the refill request to us. Please allow 3 business days for your refill to be completed.          Additional Information About Your Visit        PurpleTealhart Information     [facility] gives you secure access to your electronic health record. If you see a primary care provider, you can also send messages to your care team and make appointments. If you have questions, please call your primary care clinic.  If you do not have a primary care provider, please call [phone] and they will assist you.        Care EveryWhere ID     This is your Care EveryWhere ID. This could be used by other organizations to access your [place] medical records  [id number]        Your Vitals Were     Pulse BMI (Body Mass Index)                78 36.39 kg/m2           Blood Pressure from Last 3 Encounters:   03/01/18 120/58   02/20/18 120/68   02/15/18 118/60    Weight from Last 3 Encounters:   03/01/18 246 lb 6.4 oz (111.8 kg)   02/20/18 250 lb (113.4 kg)   02/15/18 251 lb (113.9 kg)              Today, you had the following     No orders found for display       Primary Care Provider Office  Phone # Fax #    Jesus Cherry [phone] [address] [place] [address]        Equal Access to Services     KRAIG VOSS : Hadii ileana groves joo Soronnali, wamarlenda luqadaha, qaybta kaalmada anyi, lit garcia lapennybea ranjan. So [facility] [phone].    ATENCIÓN: Si habla español, tiene a zapata disposición servicios gratuitos de asistencia lingüística. Llame al [phone].    We comply with applicable federal civil rights laws and [place] laws. We do not discriminate on the basis of race, color, national origin, age, disability, sex, sexual orientation, or gender identity.            Thank you!     Thank you for choosing [facility]  for your care. Our goal is always to provide you with excellent care. Hearing back from our patients is one way we can continue to improve our services. Please take a few minutes to complete the written survey that you may receive in the mail after your visit with us. Thank you!             Your Updated Medication List - Protect others around you: Learn how to safely use, store and throw away your medicines at www.disposemymeds.org.          This list is accurate as of 3/1/18 10:26 AM.  Always use your most recent med list.                   Brand Name Dispense Instructions for use Diagnosis    aspirin 81 MG tablet     90 tablet    Take 1 tablet (81 mg) by mouth daily    Type 2 diabetes mellitus with diabetic nephropathy (H)       atorvastatin 20 MG tablet    LIPITOR    90 tablet    Take 1 tablet (20 mg) by mouth daily    Hyperlipidemia with target LDL less than 70       docusate sodium 100 MG capsule    COLACE     Take 100 mg by mouth daily as needed for constipation        JANUVIA PO      Take 100 mg by mouth daily        lisinopril 10 MG tablet    PRINIVIL/ZESTRIL    90 tablet    Take 1 tablet (10 mg) by mouth daily    Essential hypertension with goal blood pressure less than 140/90       metFORMIN 1000 MG  tablet    GLUCOPHAGE    180 tablet    Take 1 tablet (1,000 mg) by mouth 2 times daily (with meals)    Type 2 diabetes mellitus with hyperglycemia, without long-term current use of insulin (H)       metoprolol succinate 25 MG 24 hr tablet    TOPROL-XL    45 tablet    Take 0.5 tablets (12.5 mg) by mouth daily    Coronary artery disease involving native coronary artery of native heart without angina pectoris       nitroGLYcerin 0.4 MG sublingual tablet    NITROSTAT    30 tablet    Place 1 tablet (0.4 mg) under the tongue every 5 minutes as needed for chest pain    Coronary artery disease involving native coronary artery of native heart without angina pectoris       omeprazole 20 MG tablet      Take 20 mg by mouth daily.        * order for DME     1 Units    Accu-check glucometer or per insurance coverage    Type 2 diabetes mellitus without complication (H)       * order for DME     3 Month    Accu-check glucometer test lancets, or per insurance coverage Testing once daily    Type 2 diabetes mellitus without complication (H)       * order for DME     3 Month    Accu-check glucometer test strips or per insurance coverage Testing once daily    Type 2 diabetes mellitus without complication (H)       pioglitazone 15 MG tablet    ACTOS    90 tablet    Take 1 tablet (15 mg) by mouth daily    Type 2 diabetes mellitus with hyperglycemia, without long-term current use of insulin (H)       tamsulosin 0.4 MG capsule    FLOMAX    90 capsule    TAKE 1 CAPSULE EVERY DAY    Prostate cancer (H)       * Notice:  This list has 3 medication(s) that are the same as other medications prescribed for you. Read the directions carefully, and ask your doctor or other care provider to review them with you.

## 2018-03-01 NOTE — PATIENT INSTRUCTIONS
Recommendations from today's MTM visit:                                                        1. Continue testing blood sugar in morning before breakfast and sometimes 2 hours after starting dinner.     2. Shoot for 60 grams of carbohydrates for meals and 15-30 grams of carbohydrates with snacks.     2. Can get the shingrix vaccination at your local pharmacy.     4. Will plan on getting A1c when you get back from trip.     Next MTM visit: 3 months     To schedule another MTM appointment, please call the clinic directly or you may call the MTM scheduling line at [phone] or toll-free at [phone].     My Clinical Pharmacist's contact information:                                                      It was a pleasure seeing you today!  Please feel free to contact me with any questions or concerns you have.      Kaci Salomon, PharmD  Pharmaceutical Care Resident   Pager: [phone]    You may receive a survey about the MTM services you received.  I would appreciate your feedback to help me serve you better in the future. Please fill it out and return it when you can. Your comments will be anonymous.

## 2018-04-12 ENCOUNTER — OFFICE VISIT (OUTPATIENT)
Dept: PEDIATRICS | Facility: CLINIC | Age: 68
End: 2018-04-12
Payer: COMMERCIAL

## 2018-04-12 VITALS
SYSTOLIC BLOOD PRESSURE: 132 MMHG | BODY MASS INDEX: 36.33 KG/M2 | TEMPERATURE: 97.7 F | DIASTOLIC BLOOD PRESSURE: 64 MMHG | WEIGHT: 246 LBS | HEART RATE: 66 BPM

## 2018-04-12 DIAGNOSIS — E78.5 HYPERLIPIDEMIA WITH TARGET LDL LESS THAN 70: ICD-10-CM

## 2018-04-12 DIAGNOSIS — I10 ESSENTIAL HYPERTENSION WITH GOAL BLOOD PRESSURE LESS THAN 140/90: ICD-10-CM

## 2018-04-12 DIAGNOSIS — I25.10 CORONARY ARTERY DISEASE INVOLVING NATIVE CORONARY ARTERY OF NATIVE HEART WITHOUT ANGINA PECTORIS: ICD-10-CM

## 2018-04-12 DIAGNOSIS — E11.65 TYPE 2 DIABETES MELLITUS WITH HYPERGLYCEMIA, WITHOUT LONG-TERM CURRENT USE OF INSULIN (H): Primary | ICD-10-CM

## 2018-04-12 LAB — HBA1C MFR BLD: 8 % (ref 0–6.4)

## 2018-04-12 PROCEDURE — 36415 COLL VENOUS BLD VENIPUNCTURE: CPT | Performed by: INTERNAL MEDICINE

## 2018-04-12 PROCEDURE — 99214 OFFICE O/P EST MOD 30 MIN: CPT | Performed by: INTERNAL MEDICINE

## 2018-04-12 PROCEDURE — 83036 HEMOGLOBIN GLYCOSYLATED A1C: CPT | Performed by: INTERNAL MEDICINE

## 2018-04-12 NOTE — MR AVS SNAPSHOT
After Visit Summary   4/12/2018    Héctor Quispe    MRN: [id number]           Patient Information     Date Of Birth          1950        Visit Information        Provider Department      4/12/2018 1:20 PM Jesus Cherry MD [facility]        Today's Diagnoses     Type 2 diabetes mellitus with hyperglycemia, without long-term current use of insulin (H)    -  1    Essential hypertension with goal blood pressure less than 140/90        Hyperlipidemia with target LDL less than 70          Care Instructions    INSTRUCTIONS FOR TODAY:     next follow-up 6 months     Dr Cherry            Follow-ups after your visit        Follow-up notes from your care team     Return in about 6 months (around 10/12/2018).      Your next 10 appointments already scheduled     Apr 16, 2018 10:20 AM CDT   Return Visit with Herman Snider MD   [facility] ([facility])    [address] [place] [address]  [address]  [facility] [address]   [phone]            Jun 07, 2018 10:00 AM CDT   SHORT with Jeannette Smith [facility] ([facility])    [address]  [address]  [facility] [address]   [phone]              Who to contact     If you have questions or need follow up information about today's clinic visit or your schedule please contact [facility] directly at [phone].  Normal or non-critical lab and imaging results will be communicated to you by MyChart, letter or phone within 4 business days after the clinic has received the results. If you do not hear from us within 7 days, please contact the clinic through MyChart or phone. If you have a critical or abnormal lab result, we will notify you by phone as soon as possible.  Submit refill requests through [facility] or call your pharmacy and they will forward the refill request to us. Please allow 3 business days for your refill to be  completed.          Additional Information About Your Visit        BlueRoninhart Information     [facility] gives you secure access to your electronic health record. If you see a primary care provider, you can also send messages to your care team and make appointments. If you have questions, please call your primary care clinic.  If you do not have a primary care provider, please call [phone] and they will assist you.        Care EveryWhere ID     This is your Care EveryWhere ID. This could be used by other organizations to access your [place] medical records  [id number]        Your Vitals Were     Pulse Temperature BMI (Body Mass Index)             66 97.7  F (36.5  C) (Oral) 36.33 kg/m2          Blood Pressure from Last 3 Encounters:   04/12/18 132/64   03/01/18 120/58   02/20/18 120/68    Weight from Last 3 Encounters:   04/12/18 246 lb (111.6 kg)   03/01/18 246 lb 6.4 oz (111.8 kg)   02/20/18 250 lb (113.4 kg)              We Performed the Following     Hemoglobin A1c        Primary Care Provider Office Phone # Fax #    Jesus Cherry [phone] [address] [place] [address]        Equal Access to Services     [facility]: Hadii ileana osorioo Soronnali, waaxda luqadaha, qaybta kaalmada adeegyada, lit woodruff. So [facility] [phone].    ATENCIÓN: Si habla español, tiene a zapata disposición servicios gratuitos de asistencia lingüística. Llame al [phone].    We comply with applicable federal civil rights laws and [place] laws. We do not discriminate on the basis of race, color, national origin, age, disability, sex, sexual orientation, or gender identity.            Thank you!     Thank you for choosing [facility]  for your care. Our goal is always to provide you with excellent care. Hearing back from our patients is one way we can continue to improve our services. Please take a few minutes to complete the written survey that  you may receive in the mail after your visit with us. Thank you!             Your Updated Medication List - Protect others around you: Learn how to safely use, store and throw away your medicines at www.disposemymeds.org.          This list is accurate as of 4/12/18  1:48 PM.  Always use your most recent med list.                   Brand Name Dispense Instructions for use Diagnosis    aspirin 81 MG tablet     90 tablet    Take 1 tablet (81 mg) by mouth daily    Type 2 diabetes mellitus with diabetic nephropathy (H)       atorvastatin 20 MG tablet    LIPITOR    90 tablet    Take 1 tablet (20 mg) by mouth daily    Hyperlipidemia with target LDL less than 70       docusate sodium 100 MG capsule    COLACE     Take 100 mg by mouth daily as needed for constipation        JANUVIA PO      Take 100 mg by mouth daily        lisinopril 10 MG tablet    PRINIVIL/ZESTRIL    90 tablet    Take 1 tablet (10 mg) by mouth daily    Essential hypertension with goal blood pressure less than 140/90       metFORMIN 1000 MG tablet    GLUCOPHAGE    180 tablet    Take 1 tablet (1,000 mg) by mouth 2 times daily (with meals)    Type 2 diabetes mellitus with hyperglycemia, without long-term current use of insulin (H)       metoprolol succinate 25 MG 24 hr tablet    TOPROL-XL    45 tablet    Take 0.5 tablets (12.5 mg) by mouth daily    Coronary artery disease involving native coronary artery of native heart without angina pectoris       nitroGLYcerin 0.4 MG sublingual tablet    NITROSTAT    30 tablet    Place 1 tablet (0.4 mg) under the tongue every 5 minutes as needed for chest pain    Coronary artery disease involving native coronary artery of native heart without angina pectoris       omeprazole 20 MG tablet      Take 20 mg by mouth daily.        * order for DME     1 Units    Accu-check glucometer or per insurance coverage    Type 2 diabetes mellitus without complication (H)       * order for DME     3 Month    Accu-check glucometer test  lancets, or per insurance coverage Testing once daily    Type 2 diabetes mellitus without complication (H)       * order for DME     3 Month    Accu-check glucometer test strips or per insurance coverage Testing once daily    Type 2 diabetes mellitus without complication (H)       pioglitazone 15 MG tablet    ACTOS    90 tablet    Take 1 tablet (15 mg) by mouth daily    Type 2 diabetes mellitus with hyperglycemia, without long-term current use of insulin (H)       tamsulosin 0.4 MG capsule    FLOMAX    90 capsule    TAKE 1 CAPSULE EVERY DAY    Prostate cancer (H)       * Notice:  This list has 3 medication(s) that are the same as other medications prescribed for you. Read the directions carefully, and ask your doctor or other care provider to review them with you.

## 2018-04-12 NOTE — PROGRESS NOTES
SUBJECTIVE:   Héctor Quispe is a 67 year old male who presents to clinic today for the following health issues:    Diabetes Follow-up      Patient is checking blood sugars: not regularly.  Diabetic concerns: None     Symptoms of hypoglycemia (low blood sugar): none     Paresthesias (numbness or burning in feet) or sores: No    Vascular Disease Follow-up:  Coronary Artery Disease (CAD)      Chest pain or pressure, left side neck or arm pain: No    Shortness of breath/increased sweats/nausea with exertion: No    Pain in calves walking 1-2 blocks: No    Worsened or new symptoms since last visit: No    Nitroglycerin use: no    Daily aspirin use: Yes      Hyperlipidemia Follow-Up      Rate your low fat/cholesterol diet?: good    Taking statin?  Yes, no muscle aches from statin    Other lipid medications/supplements?:  none    Hypertension Follow-up      Outpatient blood pressures are not being checked.    Low Salt Diet: no added salt    BP Readings from Last 2 Encounters:   04/12/18 132/64   03/01/18 120/58     Hemoglobin A1C (%)   Date Value       01/16/2018 6.3 (H)     LDL Cholesterol Calculated (mg/dL)   Date Value   01/16/2018 33   02/28/2017 49     LDL Cholesterol Direct (mg/dL)   Date Value   07/21/2017 52       Amount of exercise or physical activity: 2-3 days/week for an average of 30-45 minutes    Problems taking medications regularly: No    Medication side effects: none    Diet: low salt and low fat/cholesterol      Patient Active Problem List   Diagnosis     Colon polyps     Chronic right shoulder pain- recurrent since 2013     Cervical spinal stenosis     Type 2 diabetes mellitus with hyperglycemia, without long-term current use of insulin (H)     Essential hypertension with goal blood pressure less than 140/90     Hyperlipidemia with target LDL less than 70     Coronary artery disease involving native coronary artery of native heart without angina pectoris     Past Surgical History:   Procedure  Laterality Date     CYSTOSCOPY       ENT SURGERY       prostate       PROSTATE SURGERY       STENT, CORONARY, LUIS M      h/o coronary stent x 2       Social History   Substance Use Topics     Smoking status: Former Smoker     Types: Cigarettes     Quit date: 8/2/2002     Smokeless tobacco: Never Used     Alcohol use 1.8 oz/week     3 Cans of beer per week     Family History   Problem Relation Age of Onset     Alzheimer Disease Mother      CANCER Father      DIABETES No family hx of      Coronary Artery Disease No family hx of      Hypertension No family hx of      Hyperlipidemia No family hx of      CEREBROVASCULAR DISEASE No family hx of      Breast Cancer No family hx of      Colon Cancer No family hx of      Prostate Cancer No family hx of      Other Cancer No family hx of      Depression No family hx of      Anxiety Disorder No family hx of      MENTAL ILLNESS No family hx of      Substance Abuse No family hx of      Anesthesia Reaction No family hx of      Asthma No family hx of      OSTEOPOROSIS No family hx of      Genetic Disorder No family hx of      Thyroid Disease No family hx of      Obesity No family hx of      Unknown/Adopted No family hx of          Current Outpatient Prescriptions   Medication Sig Dispense Refill     SITagliptin Phosphate (JANUVIA PO) Take 100 mg by mouth daily       docusate sodium (COLACE) 100 MG capsule Take 100 mg by mouth daily as needed for constipation       pioglitazone (ACTOS) 15 MG tablet Take 1 tablet (15 mg) by mouth daily 90 tablet 1     atorvastatin (LIPITOR) 20 MG tablet Take 1 tablet (20 mg) by mouth daily 90 tablet 3     lisinopril (PRINIVIL/ZESTRIL) 10 MG tablet Take 1 tablet (10 mg) by mouth daily 90 tablet 3     metFORMIN (GLUCOPHAGE) 1000 MG tablet Take 1 tablet (1,000 mg) by mouth 2 times daily (with meals) 180 tablet 1     metoprolol succinate (TOPROL-XL) 25 MG 24 hr tablet Take 0.5 tablets (12.5 mg) by mouth daily 45 tablet 3     tamsulosin (FLOMAX) 0.4 MG  capsule TAKE 1 CAPSULE EVERY DAY 90 capsule 3     nitroglycerin (NITROSTAT) 0.4 MG SL tablet Place 1 tablet (0.4 mg) under the tongue every 5 minutes as needed for chest pain 30 tablet 6     aspirin 81 MG tablet Take 1 tablet (81 mg) by mouth daily 90 tablet 11     order for DME Accu-check glucometer or per insurance coverage 1 Units 0     order for DME Accu-check glucometer test lancets, or per insurance coverage  Testing once daily 3 Month 3     order for DME Accu-check glucometer test strips or per insurance coverage  Testing once daily 3 Month 3     omeprazole 20 MG tablet Take 20 mg by mouth daily.         ROS: The following systems have been completely reviewed and are negative except as noted in the HPI: CONSTITUTIONAL,  CARDIOVASCULAR, PULMONARY    OBJECTIVE:                                                    /64 (Cuff Size: Adult Large)  Pulse 66  Temp 97.7  F (36.5  C) (Oral)  Wt 246 lb (111.6 kg)  BMI 36.33 kg/m2 Body mass index is 36.33 kg/(m^2).  GENERAL:  alert,  no distress  NECK: no tenderness, no adenopathy  RESP: lungs clear to auscultation - no rales, no rhonchi, no wheezes  CV: regular rates and rhythm, normal S1 S2, no S3 or S4 and no murmur, no click or rub -  ABDOMEN: soft, no tenderness, no  hepatosplenomegaly, no masses, normal bowel sounds  MS: extremities- no edema     ASSESSMENT/PLAN:                                                        ICD-10-CM    1. Type 2 diabetes mellitus with hyperglycemia, without long-term current use of insulin (H) E11.65 Hemoglobin A1c      Recent visit with MTM reviewed.   Since Jan, glipizide stopped.  Actos was reduced from 45 to 15mg daily,  Continues januvia, metformin.   (could not start victoza due to cost)  Check A1C     2. Essential hypertension with goal blood pressure less than 140/90 I10 Adequate control.  Continue current regimen without changes.      3. Coronary artery disease involving native coronary artery of native heart without  angina pectoris I25.10 Stable on current regimen         4. Hyperlipidemia with target LDL less than 70 E78.5 Well controlled, continue lipitor        Jesus Cherry MD  [facility]

## 2018-04-14 PROBLEM — I25.10 CORONARY ARTERY DISEASE INVOLVING NATIVE CORONARY ARTERY OF NATIVE HEART WITHOUT ANGINA PECTORIS: Status: ACTIVE | Noted: 2018-04-14

## 2018-04-15 ENCOUNTER — TELEPHONE (OUTPATIENT)
Dept: PEDIATRICS | Facility: CLINIC | Age: 68
End: 2018-04-15

## 2018-04-15 DIAGNOSIS — E11.65 TYPE 2 DIABETES MELLITUS WITH HYPERGLYCEMIA, WITHOUT LONG-TERM CURRENT USE OF INSULIN (H): Primary | ICD-10-CM

## 2018-04-15 RX ORDER — GLIPIZIDE 5 MG/1
5 TABLET, FILM COATED, EXTENDED RELEASE ORAL DAILY
Qty: 90 TABLET | Refills: 1 | Status: SHIPPED | OUTPATIENT
Start: 2018-04-15 | End: 2018-06-07

## 2018-04-16 ENCOUNTER — OFFICE VISIT (OUTPATIENT)
Dept: UROLOGY | Facility: CLINIC | Age: 68
End: 2018-04-16
Payer: COMMERCIAL

## 2018-04-16 VITALS
WEIGHT: 243 LBS | OXYGEN SATURATION: 92 % | DIASTOLIC BLOOD PRESSURE: 74 MMHG | BODY MASS INDEX: 35.99 KG/M2 | HEIGHT: 69 IN | HEART RATE: 74 BPM | SYSTOLIC BLOOD PRESSURE: 132 MMHG

## 2018-04-16 DIAGNOSIS — Z85.46 PERSONAL HISTORY OF MALIGNANT NEOPLASM OF PROSTATE: ICD-10-CM

## 2018-04-16 DIAGNOSIS — R31.0 GROSS HEMATURIA: Primary | ICD-10-CM

## 2018-04-16 PROCEDURE — 99213 OFFICE O/P EST LOW 20 MIN: CPT | Performed by: UROLOGY

## 2018-04-16 ASSESSMENT — PAIN SCALES - GENERAL: PAINLEVEL: NO PAIN (0)

## 2018-04-16 NOTE — NURSING NOTE
Chief Complaint   Patient presents with     Hematuria     Pt here to discuss CT results     Kate Ross CMA

## 2018-04-16 NOTE — LETTER
4/16/2018       RE: Héctor Quispe  [address] [place] [address]     Dear Colleague,    Thank you for referring your patient, Héctor Quispe, to the [facility] [place] [facility]. Please see a copy of my visit note below.    History: It is a great pleasure to see this very pleasant 67-year-old gentleman in follow-up consultation today.  As we recall, he has had a recent episode of gross painless hematuria, and has had brachytherapy for [place] 7 adenocarcinoma the prostate in 2010.  His PSA has been very low and very stable since then.  His urine stream otherwise is been satisfactory.  Cystoscopy showed no sinister findings in the bladder but did show 2 small telangiectasia which were certainly attributable to the effects of radiation.  He returns today to review the CT scan and discuss the results.    CT UROGRAM   2/22/2018 8:36 AM      HISTORY: Gross hematuria.     COMPARISON: 7/11/2011.     TECHNIQUE: Prior to the administration of intravenous contrast,  helical sections were acquired from the top of the kidneys through the  pubic symphysis. Following the uneventful administration of 100 mL  Isovue-370 intravenous contrast using a split bolus, helical sections  were acquired from the top of the diaphragm through the pubic  symphysis during renal excretory phase. Coronal and sagittal  reconstructions were generated. Radiation dose for this scan was  reduced using automated exposure control, adjustment of the mA and/or  kV according to the patient's size, or iterative reconstruction  technique.     FINDINGS:   Right urinary tract: No renal or ureteral calculi. No dilatation of  the intrarenal collecting system or ureter. Two subcentimeter  low-attenuation lesions in the inferior pole of the kidney, too small  to characterize. No urothelial thickening or filling defects in the  intrarenal collecting system or ureter.     Left urinary  tract: No renal or ureteral calculi. No dilatation of the  intrarenal collecting system or ureter. 0.5 cm exophytic lesion in the  upper pole of the kidney, too small to characterize. No urothelial  thickening or filling defects in the intrarenal collecting system or  ureter.     Urinary bladder: Unremarkable to the limits of CT. Numerous radiation  therapy seeds are present in the region of the prostate gland.     Remainder of the abdomen and pelvis: The liver, spleen, and pancreas  are unremarkable. Mild low-attenuation nodularity of bilateral adrenal  glands, likely relating to adenomas. A 1 cm fat attenuation lesion in  the left adrenal gland, likely a myelolipoma. The gallbladder is  present. The small and large bowel are normal in caliber. The appendix  is unremarkable. No bowel wall thickening, pneumatosis or free  intraperitoneal gas. No enlarged lymph nodes or free fluid in the  abdomen or pelvis. Atherosclerotic calcification in the abdominal  aorta.     Scan through the lower chest is significant for coronary artery  calcification.          IMPRESSION: No cause of hematuria identified.     DENNY NUNO MD             Past Medical History:   Diagnosis Date     Coronary artery disease involving native coronary artery of native heart without angina pectoris 1/6/2016     Essential hypertension 1/6/2016     Type 2 diabetes mellitus with diabetic nephropathy (H) 1/6/2016       Social History     Social History     Marital status:      Spouse name: N/A     Number of children: N/A     Years of education: N/A     Social History Main Topics     Smoking status: Former Smoker     Types: Cigarettes     Quit date: 8/2/2002     Smokeless tobacco: Never Used     Alcohol use 1.8 oz/week     3 Cans of beer per week     Drug use: No     Sexual activity: Yes     Partners: Female     Other Topics Concern     Parent/Sibling W/ Cabg, Mi Or Angioplasty Before 65f 55m? No     Social History Narrative       Past Surgical  History:   Procedure Laterality Date     CYSTOSCOPY       ENT SURGERY       prostate       PROSTATE SURGERY       STENT, CORONARY, LUIS M      h/o coronary stent x 2       Family History   Problem Relation Age of Onset     Alzheimer Disease Mother      CANCER Father      DIABETES No family hx of      Coronary Artery Disease No family hx of      Hypertension No family hx of      Hyperlipidemia No family hx of      CEREBROVASCULAR DISEASE No family hx of      Breast Cancer No family hx of      Colon Cancer No family hx of      Prostate Cancer No family hx of      Other Cancer No family hx of      Depression No family hx of      Anxiety Disorder No family hx of      MENTAL ILLNESS No family hx of      Substance Abuse No family hx of      Anesthesia Reaction No family hx of      Asthma No family hx of      OSTEOPOROSIS No family hx of      Genetic Disorder No family hx of      Thyroid Disease No family hx of      Obesity No family hx of      Unknown/Adopted No family hx of          Current Outpatient Prescriptions:      SITagliptin Phosphate (JANUVIA PO), Take 100 mg by mouth daily, Disp: , Rfl:      docusate sodium (COLACE) 100 MG capsule, Take 100 mg by mouth daily as needed for constipation, Disp: , Rfl:      pioglitazone (ACTOS) 15 MG tablet, Take 1 tablet (15 mg) by mouth daily, Disp: 90 tablet, Rfl: 1     atorvastatin (LIPITOR) 20 MG tablet, Take 1 tablet (20 mg) by mouth daily, Disp: 90 tablet, Rfl: 3     lisinopril (PRINIVIL/ZESTRIL) 10 MG tablet, Take 1 tablet (10 mg) by mouth daily, Disp: 90 tablet, Rfl: 3     metFORMIN (GLUCOPHAGE) 1000 MG tablet, Take 1 tablet (1,000 mg) by mouth 2 times daily (with meals), Disp: 180 tablet, Rfl: 1     metoprolol succinate (TOPROL-XL) 25 MG 24 hr tablet, Take 0.5 tablets (12.5 mg) by mouth daily, Disp: 45 tablet, Rfl: 3     tamsulosin (FLOMAX) 0.4 MG capsule, TAKE 1 CAPSULE EVERY DAY, Disp: 90 capsule, Rfl: 3     nitroglycerin (NITROSTAT) 0.4 MG SL tablet, Place 1 tablet (0.4  "mg) under the tongue every 5 minutes as needed for chest pain, Disp: 30 tablet, Rfl: 6     aspirin 81 MG tablet, Take 1 tablet (81 mg) by mouth daily, Disp: 90 tablet, Rfl: 11     order for DME, Accu-check glucometer or per insurance coverage, Disp: 1 Units, Rfl: 0     order for DME, Accu-check glucometer test lancets, or per insurance coverage Testing once daily, Disp: 3 Month, Rfl: 3     order for DME, Accu-check glucometer test strips or per insurance coverage Testing once daily, Disp: 3 Month, Rfl: 3     omeprazole 20 MG tablet, Take 20 mg by mouth daily., Disp: , Rfl:      glipiZIDE (GLIPIZIDE XL) 5 MG 24 hr tablet, Take 1 tablet (5 mg) by mouth daily (Patient not taking: Reported on 4/16/2018), Disp: 90 tablet, Rfl: 1    10 point ROS of systems including Constitutional, Eyes, Respiratory, Cardiovascular, Gastroenterology, Genitourinary, Integumentary, Muscularskeletal, Psychiatric were all negative except for pertinent positives noted in my HPI.    Examination:   /74 (BP Location: Left arm, Patient Position: Sitting, Cuff Size: Adult Regular)  Pulse 74  Ht 1.753 m (5' 9\")  Wt 110.2 kg (243 lb)  SpO2 92%  BMI 35.88 kg/m2  General Impression: Very pleasant gentleman in no acute distress, well oriented in time place and person  Mental Status: Normal.  HEENT.  There is no clinical evidence of jaundice in the mucous membranes are normal  Skin: Skin is otherwise normal to examination  Respiratory System: The respiratory cycle is normal  Lymph Nodes: Not examined  Back/Flank Tenderness: Not examined  Cardiovascular System: Not examined  Abdominal Examination: Not examined  Extremities: Not examined  Genitial: Not examined  Rectal Examination: Not examined  Neurologic System: There are no focal clinical abnormal neurological signs and central, or peripheral nervous systems    Impression: Careful review of the CT scan shows no evidence of any sinister findings in the urinary tract.  There is some significant " "vascular calcification, but no other alarming features have been identified.  I did go over these results carefully with the patient today.  I did discuss the importance of continued surveillance for following the PSA following brachytherapy for prostate cancer and also that he should report to me promptly should gross hematuria be observed again.    I did discuss the entire situation with the patient in detail today.  I answered all his questions    Plan: 1 year for PSA and clinical examination    Time: 15 minutes with greater than 50% in discussion and consultation    \"This dictation was performed with voice recognition software and may contain errors,  omissions and inadvertent word substitution.\"        Again, thank you for allowing me to participate in the care of your patient.      Sincerely,    Herman Snider MD      "

## 2018-04-16 NOTE — PROGRESS NOTES
History: It is a great pleasure to see this very pleasant 67-year-old gentleman in follow-up consultation today.  As we recall, he has had a recent episode of gross painless hematuria, and has had brachytherapy for [place] 7 adenocarcinoma the prostate in 2010.  His PSA has been very low and very stable since then.  His urine stream otherwise is been satisfactory.  Cystoscopy showed no sinister findings in the bladder but did show 2 small telangiectasia which were certainly attributable to the effects of radiation.  He returns today to review the CT scan and discuss the results.    CT UROGRAM   2/22/2018 8:36 AM      HISTORY: Gross hematuria.     COMPARISON: 7/11/2011.     TECHNIQUE: Prior to the administration of intravenous contrast,  helical sections were acquired from the top of the kidneys through the  pubic symphysis. Following the uneventful administration of 100 mL  Isovue-370 intravenous contrast using a split bolus, helical sections  were acquired from the top of the diaphragm through the pubic  symphysis during renal excretory phase. Coronal and sagittal  reconstructions were generated. Radiation dose for this scan was  reduced using automated exposure control, adjustment of the mA and/or  kV according to the patient's size, or iterative reconstruction  technique.     FINDINGS:   Right urinary tract: No renal or ureteral calculi. No dilatation of  the intrarenal collecting system or ureter. Two subcentimeter  low-attenuation lesions in the inferior pole of the kidney, too small  to characterize. No urothelial thickening or filling defects in the  intrarenal collecting system or ureter.     Left urinary tract: No renal or ureteral calculi. No dilatation of the  intrarenal collecting system or ureter. 0.5 cm exophytic lesion in the  upper pole of the kidney, too small to characterize. No urothelial  thickening or filling defects in the intrarenal collecting system or  ureter.     Urinary bladder:  Unremarkable to the limits of CT. Numerous radiation  therapy seeds are present in the region of the prostate gland.     Remainder of the abdomen and pelvis: The liver, spleen, and pancreas  are unremarkable. Mild low-attenuation nodularity of bilateral adrenal  glands, likely relating to adenomas. A 1 cm fat attenuation lesion in  the left adrenal gland, likely a myelolipoma. The gallbladder is  present. The small and large bowel are normal in caliber. The appendix  is unremarkable. No bowel wall thickening, pneumatosis or free  intraperitoneal gas. No enlarged lymph nodes or free fluid in the  abdomen or pelvis. Atherosclerotic calcification in the abdominal  aorta.     Scan through the lower chest is significant for coronary artery  calcification.          IMPRESSION: No cause of hematuria identified.     DENNY NUNO MD             Past Medical History:   Diagnosis Date     Coronary artery disease involving native coronary artery of native heart without angina pectoris 1/6/2016     Essential hypertension 1/6/2016     Type 2 diabetes mellitus with diabetic nephropathy (H) 1/6/2016       Social History     Social History     Marital status:      Spouse name: N/A     Number of children: N/A     Years of education: N/A     Social History Main Topics     Smoking status: Former Smoker     Types: Cigarettes     Quit date: 8/2/2002     Smokeless tobacco: Never Used     Alcohol use 1.8 oz/week     3 Cans of beer per week     Drug use: No     Sexual activity: Yes     Partners: Female     Other Topics Concern     Parent/Sibling W/ Cabg, Mi Or Angioplasty Before 65f 55m? No     Social History Narrative       Past Surgical History:   Procedure Laterality Date     CYSTOSCOPY       ENT SURGERY       prostate       PROSTATE SURGERY       STENT, CORONARY, LUIS M      h/o coronary stent x 2       Family History   Problem Relation Age of Onset     Alzheimer Disease Mother      CANCER Father      DIABETES No family hx of       Coronary Artery Disease No family hx of      Hypertension No family hx of      Hyperlipidemia No family hx of      CEREBROVASCULAR DISEASE No family hx of      Breast Cancer No family hx of      Colon Cancer No family hx of      Prostate Cancer No family hx of      Other Cancer No family hx of      Depression No family hx of      Anxiety Disorder No family hx of      MENTAL ILLNESS No family hx of      Substance Abuse No family hx of      Anesthesia Reaction No family hx of      Asthma No family hx of      OSTEOPOROSIS No family hx of      Genetic Disorder No family hx of      Thyroid Disease No family hx of      Obesity No family hx of      Unknown/Adopted No family hx of          Current Outpatient Prescriptions:      SITagliptin Phosphate (JANUVIA PO), Take 100 mg by mouth daily, Disp: , Rfl:      docusate sodium (COLACE) 100 MG capsule, Take 100 mg by mouth daily as needed for constipation, Disp: , Rfl:      pioglitazone (ACTOS) 15 MG tablet, Take 1 tablet (15 mg) by mouth daily, Disp: 90 tablet, Rfl: 1     atorvastatin (LIPITOR) 20 MG tablet, Take 1 tablet (20 mg) by mouth daily, Disp: 90 tablet, Rfl: 3     lisinopril (PRINIVIL/ZESTRIL) 10 MG tablet, Take 1 tablet (10 mg) by mouth daily, Disp: 90 tablet, Rfl: 3     metFORMIN (GLUCOPHAGE) 1000 MG tablet, Take 1 tablet (1,000 mg) by mouth 2 times daily (with meals), Disp: 180 tablet, Rfl: 1     metoprolol succinate (TOPROL-XL) 25 MG 24 hr tablet, Take 0.5 tablets (12.5 mg) by mouth daily, Disp: 45 tablet, Rfl: 3     tamsulosin (FLOMAX) 0.4 MG capsule, TAKE 1 CAPSULE EVERY DAY, Disp: 90 capsule, Rfl: 3     nitroglycerin (NITROSTAT) 0.4 MG SL tablet, Place 1 tablet (0.4 mg) under the tongue every 5 minutes as needed for chest pain, Disp: 30 tablet, Rfl: 6     aspirin 81 MG tablet, Take 1 tablet (81 mg) by mouth daily, Disp: 90 tablet, Rfl: 11     order for DME, Accu-check glucometer or per insurance coverage, Disp: 1 Units, Rfl: 0     order for DME, Accu-check  "glucometer test lancets, or per insurance coverage Testing once daily, Disp: 3 Month, Rfl: 3     order for DME, Accu-check glucometer test strips or per insurance coverage Testing once daily, Disp: 3 Month, Rfl: 3     omeprazole 20 MG tablet, Take 20 mg by mouth daily., Disp: , Rfl:      glipiZIDE (GLIPIZIDE XL) 5 MG 24 hr tablet, Take 1 tablet (5 mg) by mouth daily (Patient not taking: Reported on 4/16/2018), Disp: 90 tablet, Rfl: 1    10 point ROS of systems including Constitutional, Eyes, Respiratory, Cardiovascular, Gastroenterology, Genitourinary, Integumentary, Muscularskeletal, Psychiatric were all negative except for pertinent positives noted in my HPI.    Examination:   /74 (BP Location: Left arm, Patient Position: Sitting, Cuff Size: Adult Regular)  Pulse 74  Ht 1.753 m (5' 9\")  Wt 110.2 kg (243 lb)  SpO2 92%  BMI 35.88 kg/m2  General Impression: Very pleasant gentleman in no acute distress, well oriented in time place and person  Mental Status: Normal.  HEENT.  There is no clinical evidence of jaundice in the mucous membranes are normal  Skin: Skin is otherwise normal to examination  Respiratory System: The respiratory cycle is normal  Lymph Nodes: Not examined  Back/Flank Tenderness: Not examined  Cardiovascular System: Not examined  Abdominal Examination: Not examined  Extremities: Not examined  Genitial: Not examined  Rectal Examination: Not examined  Neurologic System: There are no focal clinical abnormal neurological signs and central, or peripheral nervous systems    Impression: Careful review of the CT scan shows no evidence of any sinister findings in the urinary tract.  There is some significant vascular calcification, but no other alarming features have been identified.  I did go over these results carefully with the patient today.  I did discuss the importance of continued surveillance for following the PSA following brachytherapy for prostate cancer and also that he should report to " "me promptly should gross hematuria be observed again.    I did discuss the entire situation with the patient in detail today.  I answered all his questions    Plan: 1 year for PSA and clinical examination    Time: 15 minutes with greater than 50% in discussion and consultation    \"This dictation was performed with voice recognition software and may contain errors,  omissions and inadvertent word substitution.\"      "

## 2018-04-16 NOTE — MR AVS SNAPSHOT
After Visit Summary   4/16/2018    Héctor Quispe    MRN: [id number]           Patient Information     Date Of Birth          1950        Visit Information        Provider Department      4/16/2018 10:20 AM Herman Snider MD [facility] [place]        Today's Diagnoses     Gross hematuria    -  1    Personal history of malignant neoplasm of prostate           Follow-ups after your visit        Follow-up notes from your care team     Return in about 1 year (around 4/16/2019) for Physical Exam, PSA.      Your next 10 appointments already scheduled     Jun 07, 2018 10:00 AM CDT   SHORT with [facility] ([facility]    [address]  [address]  [place] [address] [phone]              Who to contact     If you have questions or need follow up information about today's clinic visit or your schedule please contact [facility] [place] directly at [phone].  Normal or non-critical lab and imaging results will be communicated to you by [employer], letter or phone within 4 business days after the clinic has received the results. If you do not hear from us within 7 days, please contact the clinic through [employer] or phone. If you have a critical or abnormal lab result, we will notify you by phone as soon as possible.  Submit refill requests through [employer] or call your pharmacy and they will forward the refill request to us. Please allow 3 business days for your refill to be completed.          Additional Information About Your Visit        MyChart Information     [employer] gives you secure access to your electronic health record. If you see a primary care provider, you can also send messages to your care team and make appointments. If you have questions, please call your primary care clinic.  If you do not have a primary care provider, please call  "[phone] and they will assist you.        Care EveryWhere ID     This is your Care EveryWhere ID. This could be used by other organizations to access your [place] medical records  [id number]        Your Vitals Were     Pulse Height Pulse Oximetry BMI (Body Mass Index)          74 1.753 m (5' 9\") 92% 35.88 kg/m2         Blood Pressure from Last 3 Encounters:   04/16/18 132/74   04/12/18 132/64   03/01/18 120/58    Weight from Last 3 Encounters:   04/16/18 110.2 kg (243 lb)   04/12/18 111.6 kg (246 lb)   03/01/18 111.8 kg (246 lb 6.4 oz)              Today, you had the following     No orders found for display       Primary Care Provider Office Phone # Fax #    Jesus Cherry [phone] [address] [place] [address]        Equal Access to Services     LATONIA VOSS AH: Hadii aad ku hadasho Soomaali, waaxda luqadaha, qaybta kaalmada adeegyada, waxay rebekahin hayanan eh hsu . So [facility] [phone].    ATENCIÓN: Si noah abrams, tiene a zapata disposición servicios gratuitos de asistencia lingüística. Llame al [phone].    We comply with applicable federal civil rights laws and [place] laws. We do not discriminate on the basis of race, color, national origin, age, disability, sex, sexual orientation, or gender identity.            Thank you!     Thank you for choosing [facility] [place]  for your care. Our goal is always to provide you with excellent care. Hearing back from our patients is one way we can continue to improve our services. Please take a few minutes to complete the written survey that you may receive in the mail after your visit with us. Thank you!             Your Updated Medication List - Protect others around you: Learn how to safely use, store and throw away your medicines at www.disposemymeds.org.          This list is accurate as of 4/16/18 11:00 AM.  Always use your most recent med list.                   " Brand Name Dispense Instructions for use Diagnosis    aspirin 81 MG tablet     90 tablet    Take 1 tablet (81 mg) by mouth daily    Type 2 diabetes mellitus with diabetic nephropathy (H)       atorvastatin 20 MG tablet    LIPITOR    90 tablet    Take 1 tablet (20 mg) by mouth daily    Hyperlipidemia with target LDL less than 70       docusate sodium 100 MG capsule    COLACE     Take 100 mg by mouth daily as needed for constipation        glipiZIDE 5 MG 24 hr tablet    glipiZIDE XL    90 tablet    Take 1 tablet (5 mg) by mouth daily    Type 2 diabetes mellitus with hyperglycemia, without long-term current use of insulin (H)       JANUVIA PO      Take 100 mg by mouth daily        lisinopril 10 MG tablet    PRINIVIL/ZESTRIL    90 tablet    Take 1 tablet (10 mg) by mouth daily    Essential hypertension with goal blood pressure less than 140/90       metFORMIN 1000 MG tablet    GLUCOPHAGE    180 tablet    Take 1 tablet (1,000 mg) by mouth 2 times daily (with meals)    Type 2 diabetes mellitus with hyperglycemia, without long-term current use of insulin (H)       metoprolol succinate 25 MG 24 hr tablet    TOPROL-XL    45 tablet    Take 0.5 tablets (12.5 mg) by mouth daily    Coronary artery disease involving native coronary artery of native heart without angina pectoris       nitroGLYcerin 0.4 MG sublingual tablet    NITROSTAT    30 tablet    Place 1 tablet (0.4 mg) under the tongue every 5 minutes as needed for chest pain    Coronary artery disease involving native coronary artery of native heart without angina pectoris       omeprazole 20 MG tablet      Take 20 mg by mouth daily.        * order for DME     1 Units    Accu-check glucometer or per insurance coverage    Type 2 diabetes mellitus without complication (H)       * order for DME     3 Month    Accu-check glucometer test lancets, or per insurance coverage Testing once daily    Type 2 diabetes mellitus without complication (H)       * order for DME     3 Month     Accu-check glucometer test strips or per insurance coverage Testing once daily    Type 2 diabetes mellitus without complication (H)       pioglitazone 15 MG tablet    ACTOS    90 tablet    Take 1 tablet (15 mg) by mouth daily    Type 2 diabetes mellitus with hyperglycemia, without long-term current use of insulin (H)       tamsulosin 0.4 MG capsule    FLOMAX    90 capsule    TAKE 1 CAPSULE EVERY DAY    Prostate cancer (H)       * Notice:  This list has 3 medication(s) that are the same as other medications prescribed for you. Read the directions carefully, and ask your doctor or other care provider to review them with you.

## 2018-04-16 NOTE — TELEPHONE ENCOUNTER
Please call Andrew.    Lab Results   Component Value Date    A1C 8.0 04/12/2018     Recent A1C much worse than last check  Recommend he resume Glipizide each morning  I have sent the script  Please have Andrew return for a diabetes visit with me in 3 months rather than 6

## 2018-06-07 ENCOUNTER — OFFICE VISIT (OUTPATIENT)
Dept: PHARMACY | Facility: CLINIC | Age: 68
End: 2018-06-07
Payer: COMMERCIAL

## 2018-06-07 VITALS
SYSTOLIC BLOOD PRESSURE: 136 MMHG | HEART RATE: 80 BPM | WEIGHT: 246.4 LBS | BODY MASS INDEX: 36.39 KG/M2 | DIASTOLIC BLOOD PRESSURE: 60 MMHG

## 2018-06-07 DIAGNOSIS — E11.65 TYPE 2 DIABETES MELLITUS WITH HYPERGLYCEMIA, WITHOUT LONG-TERM CURRENT USE OF INSULIN (H): Primary | ICD-10-CM

## 2018-06-07 DIAGNOSIS — E78.5 HYPERLIPIDEMIA WITH TARGET LDL LESS THAN 70: ICD-10-CM

## 2018-06-07 PROCEDURE — 99607 MTMS BY PHARM ADDL 15 MIN: CPT | Performed by: PHARMACIST

## 2018-06-07 PROCEDURE — 99606 MTMS BY PHARM EST 15 MIN: CPT | Performed by: PHARMACIST

## 2018-06-07 RX ORDER — MULTIVITAMIN,THERAPEUTIC
1 TABLET ORAL DAILY
COMMUNITY

## 2018-06-07 NOTE — PROGRESS NOTES
SUBJECTIVE/OBJECTIVE:                Héctor Quispe is a 67 year old male coming in for a follow-up visit for Medication Therapy Management.  He was referred to me from Dr. Cherry.     Chief Complaint: Follow up from our visit on 3/1/18 .Diabetes.   Personal Health Goal: Wants to try to get back into shape, but it is difficult to get motivated.    Tobacco: No tobacco use  Alcohol: 1-3 beverages / week    Medication Adherence/Access:  No concerns at this time.    Diabetes:  Pt currently taking metformin 1000 mg BID, Januvia 100 mg daily, pioglitazone 15 mg daily, restarted glipizide XR 5 mg daily (restarted about a month ago per Dr. Cherry due to A1c elevation). Patient reports cost of Januvia is $260/3 months from [facility], otherwise would be a lot more expensive for him. Patient is willing to pay this for Januvia. Patient is unwilling to make any changes to regimen at this time, but is willing to look into alternative options for the future. No current side effects.  SMBG: sometimes.  Ranges (patient reported):   AM fasting: 160, 120s  Patient is not experiencing hypoglycemia, 70 one morning   Recent symptoms of high blood sugar? none  Eye exam: up to date  Foot exam: up to date  Microalbumin is not < 30 mg/g. Pt is taking an ACEi/ARB.  Aspirin: Taking 81mg daily.  Diet/Exercise: Doing more exercise- 3-4 days/week on treadmill and started weightlifting this week. If not at the gym, he goes to walk his dog. Went to [place] in March where he ate more, drank more, and was less active- thinks this may have contributed to A1c elevation.   Lab Results   Component Value Date    A1C 8.0 04/12/2018    A1C 6.3 01/16/2018    A1C 6.1 07/21/2017    A1C 6.3 02/28/2017    A1C 6.2 09/13/2016     Hyperlipidemia: Current therapy includes atorvastatin 20mg once daily.  Pt reports no significant myalgias or other side effects. Gemfibrozil was stopped last MTM encounter. Patient is not fasting today.     Wt Readings from Last 10 Encounters:    06/07/18 246 lb 6.4 oz (111.8 kg)   04/16/18 243 lb (110.2 kg)   04/12/18 246 lb (111.6 kg)   03/01/18 246 lb 6.4 oz (111.8 kg)   02/20/18 250 lb (113.4 kg)   02/15/18 251 lb (113.9 kg)   01/18/18 253 lb 3.2 oz (114.9 kg)   01/16/18 251 lb (113.9 kg)   07/21/17 247 lb (112 kg)   07/05/17 250 lb (113.4 kg)     Current labs include:  Today's Vitals: /60 (BP Location: Right arm, Patient Position: Sitting, Cuff Size: Adult Regular)  Pulse 80  Wt 246 lb 6.4 oz (111.8 kg)  BMI 36.39 kg/m2  BP Readings from Last 3 Encounters:   06/07/18 136/60   04/16/18 132/74   04/12/18 132/64     Lab Results   Component Value Date    A1C 8.0 04/12/2018   .  Lab Results   Component Value Date    CHOL 128 01/16/2018     Lab Results   Component Value Date    TRIG 83 01/16/2018     Lab Results   Component Value Date    HDL 78 01/16/2018     Lab Results   Component Value Date    LDL 33 01/16/2018       Liver Function Studies -   Recent Labs   Lab Test  01/16/18   0907   PROTTOTAL  6.9   ALBUMIN  4.0   BILITOTAL  0.3   ALKPHOS  54   AST  17   ALT  19       Lab Results   Component Value Date    UCRR 143 02/28/2017    MICROL 266 02/28/2017    UMALCR 186.01 (H) 02/28/2017       Last Basic Metabolic Panel:  Lab Results   Component Value Date     01/16/2018      Lab Results   Component Value Date    POTASSIUM 4.5 01/16/2018     Lab Results   Component Value Date    CHLORIDE 106 01/16/2018     Lab Results   Component Value Date    BUN 19 01/16/2018     Lab Results   Component Value Date    CR 1.12 01/16/2018     GFR Estimate   Date Value Ref Range Status   02/22/2018 74 >60 mL/min/1.7m2 Final   01/16/2018 65 >60 mL/min/1.7m2 Final     Comment:     Non  GFR Calc   02/28/2017 84 >60 mL/min/1.7m2 Final     Comment:     Non  GFR Calc     Most Recent Immunizations   Administered Date(s) Administered     Influenza (High Dose) 3 valent vaccine 09/13/2016     Influenza (IIV3) PF 12/04/2012     Pneumo Conj  13-V (2010&after) 09/13/2016     Pneumococcal 23 valent 12/04/2012     TD (ADULT, 7+) 10/28/2005     TDAP Vaccine (Adacel) 12/16/2013     Zoster vaccine, live 01/04/2013       ASSESSMENT:              Current medications were reviewed today as discussed above.      Medication Adherence: good, no issues identified    Diabetes: Needs Improvement. Patient is not meeting A1c goal of < 7%. Since patient was recently restarted on glipizide, he would benefit from no changes currently until next A1c labs. Patient may benefit in the future from initiation of Victoza to help with weight loss and A1c lowering, depending on the cost. Will be due for A1c, and albumin urine.     Hyperlipidemia: Needs Improvement. Patient would benefit from re-evaluation of lipid panel when A1c is repeated to assess due to discontinuation of gemfibrozil previously.      PLAN:                  1. Patient to continue on current regimen for now.    2. Patient to get A1c, lipid and urine albumin in July when seeing Dr. Cherry.     3. Due to recent restart of glipizide, will hold off on making changes for now, could consider Victoza for the future to assist with weight loss an A1c lowering.     Future considerations: Pneumococcal vaccination, Shingrix    I spent 30 minutes with this patient today. All changes were made via collaborative practice agreement with Jesus Cherry. A copy of the visit note was provided to the patient's primary care provider.     Will follow up in 3 months, seeing Dr. Cherry in 1 month. Patient to make lab appointment prior to appt with Dr. Cherry.     The patient was given a summary of these recommendations as an after visit summary.    Keila Land PharmD IV Student    Kaci Salomon PharmD  Pharmaceutical Care Resident   Pager: [phone]    I agree with the resident note and plan of care.      Jeannette Smith PharmD [facility]  Medication Therapy Management Provider  Pager #[phone]

## 2018-06-07 NOTE — PATIENT INSTRUCTIONS
Recommendations from today's MTM visit:                                                        1. Continue on current regimen for now. Great job with exercise and diet!     2. MTM pharmacist will look into cost of Victoza.    3. Make sure to make a lab appointment prior to appointment with Dr. Cherry. Make sure you come fasting.     Next MTM visit: follow up with  in 1 month and can follow up with MTM in 3 months.     To schedule another MTM appointment, please call the clinic directly or you may call the MTM scheduling line at [phone] or toll-free at [phone].     My Clinical Pharmacist's contact information:                                                      It was a pleasure seeing you today!  Please feel free to contact me with any questions or concerns you have.     Kaci Salomon, PharmD  Pharmaceutical Care Resident   Pager: [phone]      You may receive a survey about the MTM services you received.  I would appreciate your feedback to help me serve you better in the future. Please fill it out and return it when you can. Your comments will be anonymous.

## 2018-06-07 NOTE — MR AVS SNAPSHOT
After Visit Summary   6/7/2018    Héctor Quispe    MRN: [id number]           Patient Information     Date Of Birth          1950        Visit Information        Provider Department      6/7/2018 10:00 AM Jeannette Smith, [facility]        Care Instructions    Recommendations from today's MTM visit:                                                        1. Continue on current regimen for now. Great job with exercise and diet!     2. MTM pharmacist will look into cost of Victoza.    3. Get A1c and lipid labs in July.    Next MTM visit: follow up with  in 1 month and can follow up with MTM in 3 months.     To schedule another MTM appointment, please call the clinic directly or you may call the MTM scheduling line at [phone] or toll-free at [phone].     My Clinical Pharmacist's contact information:                                                      It was a pleasure seeing you today!  Please feel free to contact me with any questions or concerns you have.     Kaci Salomon, PharmD  Pharmaceutical Care Resident   Pager: [phone]      You may receive a survey about the MTM services you received.  I would appreciate your feedback to help me serve you better in the future. Please fill it out and return it when you can. Your comments will be anonymous.                  Follow-ups after your visit        Who to contact     If you have questions or need follow up information about today's clinic visit or your schedule please contact [facility] directly at [phone].  Normal or non-critical lab and imaging results will be communicated to you by MyChart, letter or phone within 4 business days after the clinic has received the results. If you do not hear from us within 7 days, please contact the clinic through MyChart or phone. If you have a critical or abnormal lab result, we will notify you by phone as soon as possible.  Submit refill  requests through [employer] or call your pharmacy and they will forward the refill request to us. Please allow 3 business days for your refill to be completed.          Additional Information About Your Visit        [employer] Information     [employer] gives you secure access to your electronic health record. If you see a primary care provider, you can also send messages to your care team and make appointments. If you have questions, please call your primary care clinic.  If you do not have a primary care provider, please call [phone] and they will assist you.        Care EveryWhere ID     This is your Care EveryWhere ID. This could be used by other organizations to access your [facility] medical records  [id number]        Your Vitals Were     Pulse BMI (Body Mass Index)                80 36.39 kg/m2           Blood Pressure from Last 3 Encounters:   06/07/18 136/60   04/16/18 132/74   04/12/18 132/64    Weight from Last 3 Encounters:   06/07/18 246 lb 6.4 oz (111.8 kg)   04/16/18 243 lb (110.2 kg)   04/12/18 246 lb (111.6 kg)              Today, you had the following     No orders found for display       Primary Care Provider Office Phone # Fax #    Jesus Cherry [phone]       [address] [place] [address]        Equal Access to Services     [facility] : Hadii aad ku hadasho Soomaali, waaxda luqadaha, qaybta kaalmada adeegyada, lit woodruff. So [facility] [phone].    ATENCIÓN: Si habla español, tiene a zapata disposición servicios gratuitos de asistencia lingüística. ame al [phone].    We comply with applicable federal civil rights laws and [place] laws. We do not discriminate on the basis of race, color, national origin, age, disability, sex, sexual orientation, or gender identity.            Thank you!     Thank you for choosing [facility]  for your care. Our goal is always to provide you with excellent care. Hearing back  from our patients is one way we can continue to improve our services. Please take a few minutes to complete the written survey that you may receive in the mail after your visit with us. Thank you!             Your Updated Medication List - Protect others around you: Learn how to safely use, store and throw away your medicines at www.disposemymeds.org.          This list is accurate as of 6/7/18 10:28 AM.  Always use your most recent med list.                   Brand Name Dispense Instructions for use Diagnosis    aspirin 81 MG tablet     90 tablet    Take 1 tablet (81 mg) by mouth daily    Type 2 diabetes mellitus with diabetic nephropathy (H)       atorvastatin 20 MG tablet    LIPITOR    90 tablet    Take 1 tablet (20 mg) by mouth daily    Hyperlipidemia with target LDL less than 70       docusate sodium 100 MG capsule    COLACE     Take 100 mg by mouth daily as needed for constipation        GLIPIZIDE XL PO      Take 5 mg by mouth daily (with breakfast)        JANUVIA PO      Take 100 mg by mouth daily        lisinopril 10 MG tablet    PRINIVIL/ZESTRIL    90 tablet    Take 1 tablet (10 mg) by mouth daily    Essential hypertension with goal blood pressure less than 140/90       metFORMIN 1000 MG tablet    GLUCOPHAGE    180 tablet    Take 1 tablet (1,000 mg) by mouth 2 times daily (with meals)    Type 2 diabetes mellitus with hyperglycemia, without long-term current use of insulin (H)       metoprolol succinate 25 MG 24 hr tablet    TOPROL-XL    45 tablet    Take 0.5 tablets (12.5 mg) by mouth daily    Coronary artery disease involving native coronary artery of native heart without angina pectoris       multivitamin, therapeutic Tabs tablet      Take 1 tablet by mouth daily        nitroGLYcerin 0.4 MG sublingual tablet    NITROSTAT    30 tablet    Place 1 tablet (0.4 mg) under the tongue every 5 minutes as needed for chest pain    Coronary artery disease involving native coronary artery of native heart without  angina pectoris       omeprazole 20 MG tablet      Take 20 mg by mouth daily.        * order for DME     1 Units    Accu-check glucometer or per insurance coverage    Type 2 diabetes mellitus without complication (H)       * order for DME     3 Month    Accu-check glucometer test lancets, or per insurance coverage Testing once daily    Type 2 diabetes mellitus without complication (H)       * order for DME     3 Month    Accu-check glucometer test strips or per insurance coverage Testing once daily    Type 2 diabetes mellitus without complication (H)       pioglitazone 15 MG tablet    ACTOS    90 tablet    Take 1 tablet (15 mg) by mouth daily    Type 2 diabetes mellitus with hyperglycemia, without long-term current use of insulin (H)       tamsulosin 0.4 MG capsule    FLOMAX    90 capsule    TAKE 1 CAPSULE EVERY DAY    Prostate cancer (H)       * Notice:  This list has 3 medication(s) that are the same as other medications prescribed for you. Read the directions carefully, and ask your doctor or other care provider to review them with you.

## 2018-06-13 DIAGNOSIS — E11.65 TYPE 2 DIABETES MELLITUS WITH HYPERGLYCEMIA, WITHOUT LONG-TERM CURRENT USE OF INSULIN (H): Primary | ICD-10-CM

## 2018-06-13 NOTE — TELEPHONE ENCOUNTER
Patient had appointment on 06/07/18 with Pharm D, but Januvia was not refilled.  Per office appointment note-Diabetes:  Pt currently taking metformin 1000 mg BID, Januvia 100 mg daily, pioglitazone 15 mg daily, restarted glipizide XR 5 mg daily (restarted about a month ago per Dr. Cherry due to A1c elevation). Patient reports cost of Januvia is $260/3 months from [facility], otherwise would be a lot more expensive for him. Patient is willing to pay this for Januvia. Patient is unwilling to make any changes to regimen at this time, but is willing to look into alternative options for the future. No current side effects.    Routing refill request to provider for review/approval because:  Medication is reported/historical  Pharmacy is in [facility].  Order pended, please sign if in agreement.  RALPH Rodriguez RN

## 2018-07-23 DIAGNOSIS — E11.65 TYPE 2 DIABETES MELLITUS WITH HYPERGLYCEMIA, WITHOUT LONG-TERM CURRENT USE OF INSULIN (H): ICD-10-CM

## 2018-07-23 NOTE — TELEPHONE ENCOUNTER
"Requested Prescriptions   Pending Prescriptions Disp Refills     pioglitazone (ACTOS) 15 MG tablet [Pharmacy Med Name: PIOGLITAZONE HCL 15 MG Tablet]    Last Written Prescription Date:  1/18/2018  Last Fill Quantity: 90,  # refills: 1   Last office visit: 4/12/2018 with prescribing provider:  Jesus Cherry     Future Office Visit:     90 tablet 1     Sig: TAKE 1 TABLET EVERY DAY    Thiazolidinedione Agents (TZDs)  Failed    7/23/2018 11:22 AM       Failed - Patient has had a Microalbumin in the past 12 mos.    Recent Labs   Lab Test  02/28/17   0832   MICROL  266   UMALCR  186.01*            Failed - Patient has documented A1c within the specified period of time.    If HgbA1C is 8 or greater, it needs to be on file within the past 3 months.  If less than 8, must be on file within the past 6 months.     Recent Labs   Lab Test  04/12/18   1351   A1C  8.0*            Passed - Blood pressure less than 140/90 in past 6 months    BP Readings from Last 3 Encounters:   06/07/18 136/60   04/16/18 132/74   04/12/18 132/64                Passed - Patient has documented LDL within the past 12 mos.    Recent Labs   Lab Test  01/16/18   0907   LDL  33            Passed - Patient has a normal ALT within the past 12 mos.    Recent Labs   Lab Test  01/16/18   0907   ALT  19            Passed - Patient has a normal AST within the past 12 mos.     Recent Labs   Lab Test  01/16/18   0907   AST  17            Passed - Diagnosis not CHF       Passed - Patient is age 18 or older       Passed - Patient has a normal serum Creatinine in the past 12 months    Recent Labs   Lab Test  01/16/18   0907   CR  1.12            Passed - Recent (6 mo) or future (30 days) visit within the authorizing provider's specialty    Patient had office visit in the last 6 months or has a visit in the next 30 days with authorizing provider or within the authorizing provider's specialty.  See \"Patient Info\" tab in inbasket, or \"Choose Columns\" in Meds & " Orders section of the refill encounter.

## 2018-07-24 RX ORDER — PIOGLITAZONEHYDROCHLORIDE 15 MG/1
TABLET ORAL
Qty: 90 TABLET | Refills: 1 | Status: SHIPPED | OUTPATIENT
Start: 2018-07-24 | End: 2018-12-24

## 2018-09-26 ENCOUNTER — TRANSFERRED RECORDS (OUTPATIENT)
Dept: HEALTH INFORMATION MANAGEMENT | Facility: CLINIC | Age: 68
End: 2018-09-26

## 2018-10-16 DIAGNOSIS — C61 PROSTATE CANCER (H): ICD-10-CM

## 2018-10-16 NOTE — TELEPHONE ENCOUNTER
"Requested Prescriptions   Pending Prescriptions Disp Refills     tamsulosin (FLOMAX) 0.4 MG capsule [Pharmacy Med Name: TAMSULOSIN HCL 0.4 MG Capsule]    Last Written Prescription Date:  10/5/2017  Last Fill Quantity: 90,  # refills: 3   Last office visit: 4/12/2018 with prescribing provider:  Jesus Cherry     Future Office Visit:     90 capsule 3     Sig: TAKE 1 CAPSULE EVERY DAY    Alpha Blockers Passed    10/16/2018  1:45 PM       Passed - Blood pressure under 140/90 in past 12 months    BP Readings from Last 3 Encounters:   06/07/18 136/60   04/16/18 132/74   04/12/18 132/64                Passed - Recent (12 mo) or future (30 days) visit within the authorizing provider's specialty    Patient had office visit in the last 12 months or has a visit in the next 30 days with authorizing provider or within the authorizing provider's specialty.  See \"Patient Info\" tab in inbasket, or \"Choose Columns\" in Meds & Orders section of the refill encounter.           Passed - Patient does not have Tadalafil, Vardenafil, or Sildenafil on their medication list       Passed - Patient is 18 years of age or older          "

## 2018-10-18 RX ORDER — TAMSULOSIN HYDROCHLORIDE 0.4 MG/1
CAPSULE ORAL
Qty: 90 CAPSULE | Refills: 1 | Status: SHIPPED | OUTPATIENT
Start: 2018-10-18 | End: 2019-01-29

## 2018-10-18 NOTE — TELEPHONE ENCOUNTER
Prescription approved per [facility] Refill Protocol.  Dayna Guajardo RN

## 2018-11-01 ENCOUNTER — TELEPHONE (OUTPATIENT)
Dept: PHARMACY | Facility: CLINIC | Age: 68
End: 2018-11-01

## 2018-11-01 NOTE — TELEPHONE ENCOUNTER
Called patient and he scheduled a dm visit with Dr Cherry on 11/26 and he is coming in for fasting labs on 11/13.

## 2018-11-01 NOTE — TELEPHONE ENCOUNTER
Please call patient to schedule diabetic follow-up and if possible see MTM before Dr. Cherry visit.  It would also be helpful for him to get fasting labs (orders already placed) several days prior to these visits.

## 2018-11-13 DIAGNOSIS — E11.65 TYPE 2 DIABETES MELLITUS WITH HYPERGLYCEMIA, WITHOUT LONG-TERM CURRENT USE OF INSULIN (H): ICD-10-CM

## 2018-11-13 LAB
CHOLEST SERPL-MCNC: 110 MG/DL
CREAT UR-MCNC: 139 MG/DL
HBA1C MFR BLD: 7.2 % (ref 0–5.6)
HDLC SERPL-MCNC: 50 MG/DL
LDLC SERPL CALC-MCNC: 27 MG/DL
MICROALBUMIN UR-MCNC: 235 MG/L
MICROALBUMIN/CREAT UR: 169.06 MG/G CR (ref 0–17)
NONHDLC SERPL-MCNC: 60 MG/DL
TRIGL SERPL-MCNC: 165 MG/DL

## 2018-11-13 PROCEDURE — 36415 COLL VENOUS BLD VENIPUNCTURE: CPT | Performed by: INTERNAL MEDICINE

## 2018-11-13 PROCEDURE — 82043 UR ALBUMIN QUANTITATIVE: CPT | Performed by: INTERNAL MEDICINE

## 2018-11-13 PROCEDURE — 80061 LIPID PANEL: CPT | Performed by: INTERNAL MEDICINE

## 2018-11-13 PROCEDURE — 83036 HEMOGLOBIN GLYCOSYLATED A1C: CPT | Performed by: INTERNAL MEDICINE

## 2018-11-19 NOTE — PROGRESS NOTES
"  SUBJECTIVE:   Héctor Quispe is a 68 year old male who presents to clinic today for the following health issues:    Genitourinary - Male  Onset: ***    Description:   Dysuria (painful urination): { :481010}  Hematuria (blood in urine): { :309955}  Frequency: { :749637}  Are you urinating at night : { :607680}  Hesitancy (delay in urine): { :698860}  Retention (unable to empty): { :276502}  Decrease in urinary flow: { :745121}  Incontinence: { :470986}    Progression of Symptoms:  {.:506412}    Accompanying Signs & Symptoms:  Fever: { :400397}  Back/Flank pain: { :703853}  Urethral discharge: { :026061}  Testicle lumps/masses/pain: { :465113}  Nausea and/or vomiting: { :165009}  Abdominal pain: { :628147}    History:   History of frequent UTI's: { :851249}  History of kidney stones: { :504100}  History of hernias: { :864543}  Personal or Family history of Prostate problems: {.:082941::\"no\"}  Sexually active: { :770577}    Precipitating factors:   ***    Alleviating factors:  ***      {additional problems for provider to add:867572}    Problem list and histories reviewed & adjusted, as indicated.  Additional history: {NONE - AS DOCUMENTED:183607::\"as documented\"}    {HIST REVIEW/ LINKS 2:126607}    Reviewed and updated as needed this visit by clinical staff       Reviewed and updated as needed this visit by Provider         {PROVIDER CHARTING PREFERENCE:337542}  "

## 2018-11-20 ENCOUNTER — OFFICE VISIT (OUTPATIENT)
Dept: PEDIATRICS | Facility: CLINIC | Age: 68
End: 2018-11-20
Payer: COMMERCIAL

## 2018-11-20 VITALS
TEMPERATURE: 98 F | SYSTOLIC BLOOD PRESSURE: 138 MMHG | DIASTOLIC BLOOD PRESSURE: 62 MMHG | BODY MASS INDEX: 36.96 KG/M2 | WEIGHT: 250.3 LBS | OXYGEN SATURATION: 95 % | HEART RATE: 85 BPM

## 2018-11-20 DIAGNOSIS — N39.0 URINARY TRACT INFECTION WITH HEMATURIA, SITE UNSPECIFIED: ICD-10-CM

## 2018-11-20 DIAGNOSIS — R31.9 URINARY TRACT INFECTION WITH HEMATURIA, SITE UNSPECIFIED: ICD-10-CM

## 2018-11-20 DIAGNOSIS — R30.0 DYSURIA: ICD-10-CM

## 2018-11-20 DIAGNOSIS — E11.65 TYPE 2 DIABETES MELLITUS WITH HYPERGLYCEMIA, WITHOUT LONG-TERM CURRENT USE OF INSULIN (H): Primary | ICD-10-CM

## 2018-11-20 LAB
ALBUMIN UR-MCNC: 30 MG/DL
APPEARANCE UR: ABNORMAL
BACTERIA #/AREA URNS HPF: ABNORMAL /HPF
BILIRUB UR QL STRIP: NEGATIVE
COLOR UR AUTO: YELLOW
GLUCOSE UR STRIP-MCNC: 500 MG/DL
HGB UR QL STRIP: ABNORMAL
KETONES UR STRIP-MCNC: NEGATIVE MG/DL
LEUKOCYTE ESTERASE UR QL STRIP: ABNORMAL
NITRATE UR QL: NEGATIVE
PH UR STRIP: 6 PH (ref 5–7)
RBC #/AREA URNS AUTO: ABNORMAL /HPF
SOURCE: ABNORMAL
SP GR UR STRIP: 1.01 (ref 1–1.03)
UROBILINOGEN UR STRIP-ACNC: 0.2 EU/DL (ref 0.2–1)
WBC #/AREA URNS AUTO: >100 /HPF

## 2018-11-20 PROCEDURE — 99214 OFFICE O/P EST MOD 30 MIN: CPT | Performed by: FAMILY MEDICINE

## 2018-11-20 PROCEDURE — 87088 URINE BACTERIA CULTURE: CPT | Performed by: FAMILY MEDICINE

## 2018-11-20 PROCEDURE — 87186 SC STD MICRODIL/AGAR DIL: CPT | Performed by: FAMILY MEDICINE

## 2018-11-20 PROCEDURE — 87086 URINE CULTURE/COLONY COUNT: CPT | Performed by: FAMILY MEDICINE

## 2018-11-20 PROCEDURE — 81001 URINALYSIS AUTO W/SCOPE: CPT | Performed by: FAMILY MEDICINE

## 2018-11-20 RX ORDER — NITROFURANTOIN 25; 75 MG/1; MG/1
100 CAPSULE ORAL 2 TIMES DAILY
Qty: 10 CAPSULE | Refills: 0 | Status: SHIPPED | OUTPATIENT
Start: 2018-11-20 | End: 2018-11-25

## 2018-11-20 NOTE — PROGRESS NOTES
Subjective:   Héctor Quispe is a 68 year old male who presents for   Chief Complaint   Patient presents with     UTI     x2 days dysuria/frequency/retension. tx: none     Maybe has seen some blood in urine today. 2 days of pain with urination. Has increased frequency.   Had cystoscopy done a year ago which showed residual effects of radiation such as vessels emphasized  Was out deer hunting this past weekend and said he was holding in urine as part of the hunting process didn't want to void as to not scare off the deer.   Had prostate  Treatment 8 years ago.     Denies fevers/chills/nausea/flank discomfort    T2DM -   Metformin and Januvia has been taking chronically and was recently put back on Actos in July he reports as A1c levels had increased. He checks blood sugars every couple of days and states they have been in the 150 range.     Lab Results   Component Value Date    A1C 7.2 11/13/2018    A1C 8.0 04/12/2018    A1C 6.3 01/16/2018    A1C 6.1 07/21/2017    A1C 6.3 02/28/2017       Patient Active Problem List    Diagnosis Date Noted     Coronary artery disease involving native coronary artery of native heart without angina pectoris 04/14/2018     Priority: Medium     Cervical spinal stenosis 01/16/2018     Priority: Medium     Type 2 diabetes mellitus with hyperglycemia, without long-term current use of insulin (H) 01/16/2018     Priority: Medium     Essential hypertension with goal blood pressure less than 140/90 01/16/2018     Priority: Medium     Hyperlipidemia with target LDL less than 70 01/16/2018     Priority: Medium     Chronic right shoulder pain- recurrent since 2013 07/21/2017     Priority: Medium     Colon polyps 07/01/2013     Priority: Medium       Current Outpatient Prescriptions   Medication     aspirin 81 MG tablet     atorvastatin (LIPITOR) 20 MG tablet     GLIPIZIDE XL PO     lisinopril (PRINIVIL/ZESTRIL) 10 MG tablet     metFORMIN (GLUCOPHAGE) 1000 MG tablet     metoprolol succinate  (TOPROL-XL) 25 MG 24 hr tablet     multivitamin, therapeutic (THERA-VIT) TABS tablet     nitroFURantoin, macrocrystal-monohydrate, (MACROBID) 100 MG capsule     nitroglycerin (NITROSTAT) 0.4 MG SL tablet     omeprazole 20 MG tablet     order for DME     order for DME     order for DME     pioglitazone (ACTOS) 15 MG tablet     sitagliptin (JANUVIA) 100 MG tablet     SITagliptin Phosphate (JANUVIA PO)     tamsulosin (FLOMAX) 0.4 MG capsule     docusate sodium (COLACE) 100 MG capsule     No current facility-administered medications for this visit.      ROS:  As above per HPI    Objective:   /62  Pulse 85  Temp 98  F (36.7  C) (Oral)  Wt 250 lb 4.8 oz (113.5 kg)  SpO2 95%  BMI 36.96 kg/m2, Body mass index is 36.96 kg/(m^2).  Gen:  NAD, well-nourished, sitting in chair comfortably  HEENT: EOMI, sclera anicteric, Head normocephalic, ; nares patent; moist mucous membranes  Neck: trachea midline, no thyromegaly  CV:  Hemodynamically stable  Pulm:  no increased work of breathing   Back: no CVA discomfort  Extrem: no cyanosis, edema or clubbing  Skin: no obvious rashes or abnormalities  Psych: Euthymic, linear thoughts, normal rate of speech    Results for orders placed or performed in visit on 11/20/18   UA reflex to Microscopic and Culture   Result Value Ref Range    Color Urine Yellow     Appearance Urine Cloudy     Glucose Urine 500 (A) NEG^Negative mg/dL    Bilirubin Urine Negative NEG^Negative    Ketones Urine Negative NEG^Negative mg/dL    Specific Gravity Urine 1.010 1.003 - 1.035    Blood Urine Large (A) NEG^Negative    pH Urine 6.0 5.0 - 7.0 pH    Protein Albumin Urine 30 (A) NEG^Negative mg/dL    Urobilinogen Urine 0.2 0.2 - 1.0 EU/dL    Nitrite Urine Negative NEG^Negative    Leukocyte Esterase Urine Large (A) NEG^Negative    Source Midstream Urine    Urine Microscopic   Result Value Ref Range    WBC Urine >100 (A) OTO5^0 - 5 /HPF    RBC Urine 10-25 (A) OTO2^O - 2 /HPF    Bacteria Urine Moderate (A)  NEG^Negative /HPF       Assessment & Plan:   Héctor Quispe, 68 year old male who presents with:    Urinary tract infection with hematuria, site unspecified  Will treat with standard therapy twice daily macrobid - discussed should have improvement by day 3 otherwise may need to change therapy. No signs of pyelonephritis. Encouraged good oral hydration.   - nitroFURantoin, macrocrystal-monohydrate, (MACROBID) 100 MG capsule  Dispense: 10 capsule; Refill: 0    Type 2 diabetes mellitus with hyperglycemia, without long-term current use of insulin (H)  Discussed more routine checks of his blood sugar. Large amount in urine today which I explained can increase his chances of UTI. Appears he is adherent to this current therapy, he does have f/u with provider in the next 2 weeks to possibly adjust his medications.     Dysuria  - UA reflex to Microscopic and Culture  - Urine Microscopic  - Urine Culture Aerobic Bacterial      Raji Gaviria MD   [facility]     Options for treatment and/or follow-up care were reviewed with the patient. Héctor Quispe and/or legal guardian was engaged and actively involved in the decision making process. Patient/guardian verbalized understanding of the options discussed and was satisfied with the final plan.

## 2018-11-20 NOTE — PATIENT INSTRUCTIONS
Take medication macrobid twice a day for 5 days    Symptoms should improve within the next 2-3 days. If no improvement, call or return for recommendation    Drink at least 6 glasses of water a day    If you develop flank pain, fevers, nausea/vomiting call immediately for assistance    --    Follow-up appointment with your Doctor in 2 weeks for your diabetes

## 2018-11-20 NOTE — MR AVS SNAPSHOT
After Visit Summary   11/20/2018    Héctor Quispe    MRN: [id number]           Patient Information     Date Of Birth          1950        Visit Information        Provider Department      11/20/2018 9:10 AM Raji Gaviria MD [facility]        Today's Diagnoses     Type 2 diabetes mellitus with hyperglycemia, without long-term current use of insulin (H)    -  1    Urinary tract infection with hematuria, site unspecified        Dysuria          Care Instructions    Take medication macrobid twice a day for 5 days    Symptoms should improve within the next 2-3 days. If no improvement, call or return for recommendation    Drink at least 6 glasses of water a day    If you develop flank pain, fevers, nausea/vomiting call immediately for assistance    --    Follow-up appointment with your Doctor in 2 weeks for your diabetes              Follow-ups after your visit        Your next 10 appointments already scheduled     Nov 26, 2018  8:00 AM CST   Office Visit with Jesus Cherry MD   [facility] ([facility])    [address] [place] [id number] [phone]           Bring a current list of meds and any records pertaining to this visit. For Physicals, please bring immunization records and any forms needing to be filled out. Please arrive 10 minutes early to complete paperwork.              Who to contact     If you have questions or need follow up information about today's clinic visit or your schedule please contact [facility] directly at [phone].  Normal or non-critical lab and imaging results will be communicated to you by MyChart, letter or phone within 4 business days after the clinic has received the results. If you do not hear from us within 7 days, please contact the clinic through MyChart or phone. If you have a critical or abnormal lab result, we will notify you by phone as soon as possible.  Submit  refill requests through [facility] or call your pharmacy and they will forward the refill request to us. Please allow 3 business days for your refill to be completed.          Additional Information About Your Visit        [facility] Information     [employer] gives you secure access to your electronic health record. If you see a primary care provider, you can also send messages to your care team and make appointments. If you have questions, please call your primary care clinic.  If you do not have a primary care provider, please call [phone] and they will assist you.        Care EveryWhere ID     This is your Care EveryWhere ID. This could be used by other organizations to access your [facility] medical records  [id number]        Your Vitals Were     Pulse Temperature Pulse Oximetry BMI (Body Mass Index)          85 98  F (36.7  C) (Oral) 95% 36.96 kg/m2         Blood Pressure from Last 3 Encounters:   11/20/18 138/62   06/07/18 136/60   04/16/18 132/74    Weight from Last 3 Encounters:   11/20/18 250 lb 4.8 oz (113.5 kg)   06/07/18 246 lb 6.4 oz (111.8 kg)   04/16/18 243 lb (110.2 kg)              We Performed the Following     UA reflex to Microscopic and Culture     Urine Culture Aerobic Bacterial     Urine Microscopic          Today's Medication Changes          These changes are accurate as of 11/20/18  9:34 AM.  If you have any questions, ask your nurse or doctor.               Start taking these medicines.        Dose/Directions    nitroFURantoin (macrocrystal-monohydrate) 100 MG capsule   Commonly known as:  MACROBID   Used for:  Urinary tract infection with hematuria, site unspecified   Started by:  Raji Gaviria MD        Dose:  100 mg   Take 1 capsule (100 mg) by mouth 2 times daily for 5 days   Quantity:  10 capsule   Refills:  0            Where to get your medicines      These medications were sent to [id number]  [place] [address]  [address]  [address]     Phone:  [phone]     nitroFURantoin (macrocrystal-monohydrate) 100 MG capsule                Primary Care Provider Office Phone # Fax #    Jesus Cherry [phone]       [facility] [place] [address]        Equal Access to Services     [facility]: Hadii aad ku hadasho Soomaali, waaxda luqadaha, qaybta kaalmada adeegyada, waxay rebekahin hayanan adelucretia jose shadi . So [facility] [phone].    ATENCIÓN: Si habla español, tiene a zapata disposición servicios gratuitos de asistencia lingüística. [facility] [phone].    We comply with applicable federal civil rights laws and [place] laws. We do not discriminate on the basis of race, color, national origin, age, disability, sex, sexual orientation, or gender identity.            Thank you!     Thank you for choosing [facility]  for your care. Our goal is always to provide you with excellent care. Hearing back from our patients is one way we can continue to improve our services. Please take a few minutes to complete the written survey that you may receive in the mail after your visit with us. Thank you!             Your Updated Medication List - Protect others around you: Learn how to safely use, store and throw away your medicines at www.disposemymeds.org.          This list is accurate as of 11/20/18  9:34 AM.  Always use your most recent med list.                   Brand Name Dispense Instructions for use Diagnosis    aspirin 81 MG tablet     90 tablet    Take 1 tablet (81 mg) by mouth daily    Type 2 diabetes mellitus with diabetic nephropathy (H)       atorvastatin 20 MG tablet    LIPITOR    90 tablet    Take 1 tablet (20 mg) by mouth daily    Hyperlipidemia with target LDL less than 70       docusate sodium 100 MG capsule    COLACE     Take 100 mg by mouth daily as needed for constipation        GLIPIZIDE XL PO      Take 5 mg by mouth daily (with breakfast)        * JANUVIA PO      Take 100 mg by mouth  daily        * sitagliptin 100 MG tablet    JANUVIA    90 tablet    Take 1 tablet (100 mg) by mouth daily Reference #[id number]    Type 2 diabetes mellitus with hyperglycemia, without long-term current use of insulin (H)       lisinopril 10 MG tablet    PRINIVIL/ZESTRIL    90 tablet    Take 1 tablet (10 mg) by mouth daily    Essential hypertension with goal blood pressure less than 140/90       metFORMIN 1000 MG tablet    GLUCOPHAGE    180 tablet    Take 1 tablet (1,000 mg) by mouth 2 times daily (with meals)    Type 2 diabetes mellitus with hyperglycemia, without long-term current use of insulin (H)       metoprolol succinate 25 MG 24 hr tablet    TOPROL-XL    45 tablet    Take 0.5 tablets (12.5 mg) by mouth daily    Coronary artery disease involving native coronary artery of native heart without angina pectoris       multivitamin, therapeutic Tabs tablet      Take 1 tablet by mouth daily        nitroFURantoin (macrocrystal-monohydrate) 100 MG capsule    MACROBID    10 capsule    Take 1 capsule (100 mg) by mouth 2 times daily for 5 days    Urinary tract infection with hematuria, site unspecified       nitroGLYcerin 0.4 MG sublingual tablet    NITROSTAT    30 tablet    Place 1 tablet (0.4 mg) under the tongue every 5 minutes as needed for chest pain    Coronary artery disease involving native coronary artery of native heart without angina pectoris       omeprazole 20 MG tablet      Take 20 mg by mouth daily.        * order for DME     1 Units    Accu-check glucometer or per insurance coverage    Type 2 diabetes mellitus without complication (H)       * order for DME     3 Month    Accu-check glucometer test lancets, or per insurance coverage Testing once daily    Type 2 diabetes mellitus without complication (H)       * order for DME     3 Month    Accu-check glucometer test strips or per insurance coverage Testing once daily    Type 2 diabetes mellitus without complication (H)       pioglitazone 15 MG tablet    ACTOS     90 tablet    TAKE 1 TABLET EVERY DAY    Type 2 diabetes mellitus with hyperglycemia, without long-term current use of insulin (H)       tamsulosin 0.4 MG capsule    FLOMAX    90 capsule    TAKE 1 CAPSULE EVERY DAY    Prostate cancer (H)       * Notice:  This list has 5 medication(s) that are the same as other medications prescribed for you. Read the directions carefully, and ask your doctor or other care provider to review them with you.

## 2018-11-22 LAB
BACTERIA SPEC CULT: ABNORMAL
SPECIMEN SOURCE: ABNORMAL

## 2018-11-26 ENCOUNTER — OFFICE VISIT (OUTPATIENT)
Dept: PEDIATRICS | Facility: CLINIC | Age: 68
End: 2018-11-26
Payer: COMMERCIAL

## 2018-11-26 VITALS
HEIGHT: 69 IN | HEART RATE: 68 BPM | SYSTOLIC BLOOD PRESSURE: 128 MMHG | TEMPERATURE: 97.7 F | DIASTOLIC BLOOD PRESSURE: 70 MMHG | BODY MASS INDEX: 36.88 KG/M2 | WEIGHT: 249 LBS

## 2018-11-26 DIAGNOSIS — E78.5 HYPERLIPIDEMIA WITH TARGET LDL LESS THAN 70: ICD-10-CM

## 2018-11-26 DIAGNOSIS — E11.65 TYPE 2 DIABETES MELLITUS WITH HYPERGLYCEMIA, WITHOUT LONG-TERM CURRENT USE OF INSULIN (H): Primary | ICD-10-CM

## 2018-11-26 DIAGNOSIS — Z23 NEED FOR PNEUMOCOCCAL VACCINATION: ICD-10-CM

## 2018-11-26 DIAGNOSIS — I25.10 CORONARY ARTERY DISEASE INVOLVING NATIVE CORONARY ARTERY OF NATIVE HEART WITHOUT ANGINA PECTORIS: ICD-10-CM

## 2018-11-26 DIAGNOSIS — I10 ESSENTIAL HYPERTENSION WITH GOAL BLOOD PRESSURE LESS THAN 140/90: ICD-10-CM

## 2018-11-26 LAB
ALBUMIN UR-MCNC: 100 MG/DL
APPEARANCE UR: CLEAR
BILIRUB UR QL STRIP: ABNORMAL
COLOR UR AUTO: ABNORMAL
GLUCOSE UR STRIP-MCNC: NEGATIVE MG/DL
HGB UR QL STRIP: NEGATIVE
KETONES UR STRIP-MCNC: ABNORMAL MG/DL
LEUKOCYTE ESTERASE UR QL STRIP: NEGATIVE
NITRATE UR QL: NEGATIVE
PH UR STRIP: 6.5 PH (ref 5–7)
RBC #/AREA URNS AUTO: NORMAL /HPF
SOURCE: ABNORMAL
SP GR UR STRIP: 1.02 (ref 1–1.03)
UROBILINOGEN UR STRIP-ACNC: 0.2 EU/DL (ref 0.2–1)
WBC #/AREA URNS AUTO: NORMAL /HPF

## 2018-11-26 PROCEDURE — 99207 C FOOT EXAM  NO CHARGE: CPT | Mod: 25 | Performed by: INTERNAL MEDICINE

## 2018-11-26 PROCEDURE — 99214 OFFICE O/P EST MOD 30 MIN: CPT | Mod: 25 | Performed by: INTERNAL MEDICINE

## 2018-11-26 PROCEDURE — G0009 ADMIN PNEUMOCOCCAL VACCINE: HCPCS | Performed by: INTERNAL MEDICINE

## 2018-11-26 PROCEDURE — 81001 URINALYSIS AUTO W/SCOPE: CPT | Performed by: INTERNAL MEDICINE

## 2018-11-26 PROCEDURE — 90732 PPSV23 VACC 2 YRS+ SUBQ/IM: CPT | Performed by: INTERNAL MEDICINE

## 2018-11-26 RX ORDER — GLIPIZIDE 5 MG/1
5 TABLET, FILM COATED, EXTENDED RELEASE ORAL
Qty: 90 TABLET | Refills: 1 | Status: SHIPPED | OUTPATIENT
Start: 2018-11-26 | End: 2019-05-23

## 2018-11-26 NOTE — NURSING NOTE
Immunization History   Administered Date(s) Administered     Influenza (High Dose) 3 valent vaccine 11/27/2013, 11/25/2014, 09/13/2016, 11/15/2018     Influenza (IIV3) PF 12/04/2012     Pneumo Conj 13-V (2010&after) 09/13/2016     Pneumococcal 23 valent 12/04/2012, 11/26/2018     TD (ADULT, 7+) 10/28/2005     TDAP Vaccine (Adacel) 12/16/2013     Zoster vaccine, live 01/04/2013     Screening Questionnaire for Adult Immunization    Are you sick today?   No   Do you have allergies to medications, food, a vaccine component or latex?   No   Have you ever had a serious reaction after receiving a vaccination?   No   Do you have a long-term health problem with heart disease, lung disease, asthma, kidney disease, metabolic disease (e.g. diabetes), anemia, or other blood disorder?   No   Do you have cancer, leukemia, HIV/AIDS, or any other immune system problem?   No   In the past 3 months, have you taken medications that affect  your immune system, such as prednisone, other steroids, or anticancer drugs; drugs for the treatment of rheumatoid arthritis, Crohn s disease, or psoriasis; or have you had radiation treatments?   No   Have you had a seizure, or a brain or other nervous system problem?   No   During the past year, have you received a transfusion of blood or blood     products, or been given immune (gamma) globulin or antiviral drug?   No   For women: Are you pregnant or is there a chance you could become        pregnant during the next month?   No   Have you received any vaccinations in the past 4 weeks?   No     Immunization questionnaire answers were all negative.           Screening performed by Zuleyma Devlin on 11/26/2018 at 8:59 AM.

## 2018-11-26 NOTE — PROGRESS NOTES
SUBJECTIVE:   Héctor Quispe is a 68 year old male who presents to clinic today for the following health issues:    Diabetes Follow-up      Patient is checking blood sugars: rarely.      Diabetic concerns: None     Symptoms of hypoglycemia (low blood sugar): none     Paresthesias (numbness or burning in feet) or sores: No    Vascular Disease Follow-up:  Coronary Artery Disease (CAD)      Chest pain or pressure, left side neck or arm pain: No    Shortness of breath/increased sweats/nausea with exertion: No    Pain in calves walking 1-2 blocks: No    Worsened or new symptoms since last visit: No    Nitroglycerin use: no    Daily aspirin use: Yes    Hyperlipidemia Follow-Up      Rate your low fat/cholesterol diet?: good    Taking statin?  Yes, no muscle aches from statin    Other lipid medications/supplements?:  None  Recent Labs   Lab Test  11/13/18   0757  01/16/18   0907   05/06/15   0908   01/11/13   0744   CHOL  110  128   < >  131   --   134   HDL  50  78   < >  71   --   68   LDL  27  33   < >  50   < >  52   TRIG  165*  83   < >  50   --   68   CHOLHDLRATIO   --    --    --   1.8   --   2.0    < > = values in this interval not displayed.       Hypertension Follow-up      Outpatient blood pressures are not being checked.    Low Salt Diet: no added salt    BP Readings from Last 2 Encounters:   11/26/18 128/70   11/20/18 138/62     Hemoglobin A1C (%)   Date Value   11/13/2018 7.2 (H)   04/12/2018 8.0 (H)     LDL Cholesterol Calculated (mg/dL)   Date Value   11/13/2018 27   01/16/2018 33       Amount of exercise or physical activity: 2-3 days/week for an average of 45-60 minutes    Problems taking medications regularly: No    Medication side effects: none    Diet: low salt and low fat/cholesterol      Patient Active Problem List   Diagnosis     Colon polyps     Chronic right shoulder pain- recurrent since 2013     Cervical spinal stenosis     Type 2 diabetes mellitus with hyperglycemia, without long-term current  use of insulin (H)     Essential hypertension with goal blood pressure less than 140/90     Hyperlipidemia with target LDL less than 70     Coronary artery disease involving native coronary artery of native heart without angina pectoris     Past Surgical History:   Procedure Laterality Date     CYSTOSCOPY       ENT SURGERY       prostate       PROSTATE SURGERY       STENT, CORONARY, LUIS M      h/o coronary stent x 2       Social History   Substance Use Topics     Smoking status: Former Smoker     Types: Cigarettes     Quit date: 8/2/2002     Smokeless tobacco: Never Used     Alcohol use 1.8 oz/week     3 Cans of beer per week     Family History   Problem Relation Age of Onset     Alzheimer Disease Mother      Cancer Father      Diabetes No family hx of      Coronary Artery Disease No family hx of      Hypertension No family hx of      Hyperlipidemia No family hx of      Cerebrovascular Disease No family hx of      Breast Cancer No family hx of      Colon Cancer No family hx of      Prostate Cancer No family hx of      Other Cancer No family hx of      Depression No family hx of      Anxiety Disorder No family hx of      Mental Illness No family hx of      Substance Abuse No family hx of      Anesthesia Reaction No family hx of      Asthma No family hx of      Osteoporosis No family hx of      Genetic Disorder No family hx of      Thyroid Disease No family hx of      Obesity No family hx of      Unknown/Adopted No family hx of          Current Outpatient Prescriptions   Medication Sig Dispense Refill     aspirin 81 MG tablet Take 1 tablet (81 mg) by mouth daily 90 tablet 11     atorvastatin (LIPITOR) 20 MG tablet Take 1 tablet (20 mg) by mouth daily 90 tablet 3     docusate sodium (COLACE) 100 MG capsule Take 100 mg by mouth daily as needed for constipation       glipiZIDE (GLIPIZIDE XL) 5 MG 24 hr tablet Take 1 tablet (5 mg) by mouth daily (with breakfast) 90 tablet 1     lisinopril (PRINIVIL/ZESTRIL) 10 MG tablet  "Take 1 tablet (10 mg) by mouth daily 90 tablet 3     metFORMIN (GLUCOPHAGE) 1000 MG tablet Take 1 tablet (1,000 mg) by mouth 2 times daily (with meals) 180 tablet 1     metoprolol succinate (TOPROL-XL) 25 MG 24 hr tablet Take 0.5 tablets (12.5 mg) by mouth daily 45 tablet 3     multivitamin, therapeutic (THERA-VIT) TABS tablet Take 1 tablet by mouth daily       nitroglycerin (NITROSTAT) 0.4 MG SL tablet Place 1 tablet (0.4 mg) under the tongue every 5 minutes as needed for chest pain 30 tablet 6     omeprazole 20 MG tablet Take 20 mg by mouth daily.       order for DME Accu-check glucometer or per insurance coverage 1 Units 0     order for DME Accu-check glucometer test lancets, or per insurance coverage  Testing once daily 3 Month 3     order for DME Accu-check glucometer test strips or per insurance coverage  Testing once daily 3 Month 3     pioglitazone (ACTOS) 15 MG tablet TAKE 1 TABLET EVERY DAY 90 tablet 1     sitagliptin (JANUVIA) 100 MG tablet Take 1 tablet (100 mg) by mouth daily Reference #[id number] 90 tablet 1     tamsulosin (FLOMAX) 0.4 MG capsule TAKE 1 CAPSULE EVERY DAY 90 capsule 1     ROS: The following systems have been completely reviewed and are negative except as noted in the HPI: CONSTITUTIONAL, CARDIOVASCULAR, PULMONARY    OBJECTIVE:                                                    /70 (Cuff Size: Adult Large)  Pulse 68  Temp 97.7  F (36.5  C) (Oral)  Ht 5' 9\" (1.753 m)  Wt 249 lb (112.9 kg)  BMI 36.77 kg/m2 Body mass index is 36.77 kg/(m^2).  GENERAL:  alert,  no distress  NECK: no tenderness, no adenopathy  RESP: lungs clear to auscultation - no rales, no rhonchi, no wheezes  CV: regular rates and rhythm, normal S1 S2, no S3 or S4 and no murmur, no click or rub   MS: extremities- no edema  Diabetic foot exam: without skin lesions.  Monofilament testing: normal sensation      ASSESSMENT/PLAN:                                                        ICD-10-CM    1. Type 2 diabetes " mellitus with hyperglycemia, without long-term current use of insulin (H) E11.65 metFORMIN (GLUCOPHAGE) 1000 MG tablet     FOOT EXAM     UA reflex to Microscopic     sitagliptin (JANUVIA) 100 MG tablet     glipiZIDE (GLIPIZIDE XL) 5 MG 24 hr tablet     Urine Microscopic         Current regimen:metformin 2000mg every day, glipizide 5, actos 15, januvia 100.      A1C slightly better than last check.   Discussed [id number] states victoza was $900 per month.  Prefers no changes to current rx/has found less expensive januvia through [facility]      rt 3 months for recheck     2. Coronary artery disease involving native coronary artery of native heart without angina pectoris I25.10 Stable coronary disease, continue current rx         3. Essential hypertension with goal blood pressure less than 140/90 I10 Adequate control.  Continue current regimen without changes.      4. Hyperlipidemia with target LDL less than 70 E78.5 Well controlled.  Triglycerides minimally elevated off gemfibrozil (no need to resume)     Continue lipitor     5. Need for pneumococcal vaccination Z23 Pneumococcal vaccine 23 valent PPSV23  (Pneumovax) [[id number]]     ADMIN: Vaccine, Initial ([id number])        Jesus Cherry MD  [facility]

## 2018-11-26 NOTE — MR AVS SNAPSHOT
After Visit Summary   11/26/2018    Héctor Quispe    MRN: [id number]           Patient Information     Date Of Birth          1950        Visit Information        Provider Department      11/26/2018 8:00 AM Jesus Cherry MD [facility]        Today's Diagnoses     Type 2 diabetes mellitus with hyperglycemia, without long-term current use of insulin (H)    -  1    Coronary artery disease involving native coronary artery of native heart without angina pectoris        Essential hypertension with goal blood pressure less than 140/90        Hyperlipidemia with target LDL less than 70          Care Instructions    INSTRUCTIONS FOR TODAY:     follow-up visit in 3 months     Dr Cherry            Follow-ups after your visit        Who to contact     If you have questions or need follow up information about today's clinic visit or your schedule please contact [facility] directly at [phone].  Normal or non-critical lab and imaging results will be communicated to you by [employer], letter or phone within 4 business days after the clinic has received the results. If you do not hear from us within 7 days, please contact the clinic through [employer] or phone. If you have a critical or abnormal lab result, we will notify you by phone as soon as possible.  Submit refill requests through [employer] or call your pharmacy and they will forward the refill request to us. Please allow 3 business days for your refill to be completed.          Additional Information About Your Visit        MyChart Information     [employer] gives you secure access to your electronic health record. If you see a primary care provider, you can also send messages to your care team and make appointments. If you have questions, please call your primary care clinic.  If you do not have a primary care provider, please call [phone] and they will assist you.        Care EveryWhere ID     This is your Care EveryWhere ID.  "This could be used by other organizations to access your [place] medical records  [id number]        Your Vitals Were     Pulse Temperature Height BMI (Body Mass Index)          68 97.7  F (36.5  C) (Oral) 5' 9\" (1.753 m) 36.77 kg/m2         Blood Pressure from Last 3 Encounters:   11/26/18 128/70   11/20/18 138/62   06/07/18 136/60    Weight from Last 3 Encounters:   11/26/18 249 lb (112.9 kg)   11/20/18 250 lb 4.8 oz (113.5 kg)   06/07/18 246 lb 6.4 oz (111.8 kg)              We Performed the Following     FOOT EXAM     UA reflex to Microscopic          Today's Medication Changes          These changes are accurate as of 11/26/18  8:28 AM.  If you have any questions, ask your nurse or doctor.               These medicines have changed or have updated prescriptions.        Dose/Directions    glipiZIDE 5 MG 24 hr tablet   Commonly known as:  glipiZIDE XL   This may have changed:  medication strength   Used for:  Type 2 diabetes mellitus with hyperglycemia, without long-term current use of insulin (H)   Changed by:  Jesus Cherry MD        Dose:  5 mg   Take 1 tablet (5 mg) by mouth daily (with breakfast)   Quantity:  90 tablet   Refills:  1       sitagliptin 100 MG tablet   Commonly known as:  JANUVIA   This may have changed:    - medication strength  - additional instructions   Used for:  Type 2 diabetes mellitus with hyperglycemia, without long-term current use of insulin (H)   Changed by:  Jesus Cherry MD        Dose:  100 mg   Take 1 tablet (100 mg) by mouth daily Reference #[id number]   Quantity:  90 tablet   Refills:  1            Where to get your medicines      These medications were sent to [facility] - [place], [place] - [address]  [address], [facility] [address]     Phone:  [phone]     glipiZIDE 5 MG 24 hr tablet    metFORMIN 1000 MG tablet         Some of these will need a paper prescription and others can be bought over the counter.  Ask your nurse " if you have questions.     Bring a paper prescription for each of these medications     sitagliptin 100 MG tablet                Primary Care Provider Office Phone # Fax #    Jesus Cherry [phone] [address] [place] [address]        Equal Access to Services     [facility]: Hadii aad ku hadjuliuso Soomaali, waaxda luqadaha, qaybta kaalmada adeegyada, waxay idiin hayanan adeeg jose lapennybea woodruff. So [facility] [phone].    ATENCIÓN: Si habla español, tiene a zapata disposición servicios gratuitos de asistencia lingüística. Llame al [phone].    We comply with applicable federal civil rights laws and [place] laws. We do not discriminate on the basis of race, color, national origin, age, disability, sex, sexual orientation, or gender identity.            Thank you!     Thank you for choosing [facility]  for your care. Our goal is always to provide you with excellent care. Hearing back from our patients is one way we can continue to improve our services. Please take a few minutes to complete the written survey that you may receive in the mail after your visit with us. Thank you!             Your Updated Medication List - Protect others around you: Learn how to safely use, store and throw away your medicines at www.disposemymeds.org.          This list is accurate as of 11/26/18  8:28 AM.  Always use your most recent med list.                   Brand Name Dispense Instructions for use Diagnosis    aspirin 81 MG tablet    ASA    90 tablet    Take 1 tablet (81 mg) by mouth daily    Type 2 diabetes mellitus with diabetic nephropathy (H)       atorvastatin 20 MG tablet    LIPITOR    90 tablet    Take 1 tablet (20 mg) by mouth daily    Hyperlipidemia with target LDL less than 70       docusate sodium 100 MG capsule    COLACE     Take 100 mg by mouth daily as needed for constipation        glipiZIDE 5 MG 24 hr tablet    glipiZIDE XL    90 tablet    Take 1 tablet (5 mg)  by mouth daily (with breakfast)    Type 2 diabetes mellitus with hyperglycemia, without long-term current use of insulin (H)       lisinopril 10 MG tablet    PRINIVIL/ZESTRIL    90 tablet    Take 1 tablet (10 mg) by mouth daily    Essential hypertension with goal blood pressure less than 140/90       metFORMIN 1000 MG tablet    GLUCOPHAGE    180 tablet    Take 1 tablet (1,000 mg) by mouth 2 times daily (with meals)    Type 2 diabetes mellitus with hyperglycemia, without long-term current use of insulin (H)       metoprolol succinate 25 MG 24 hr tablet    TOPROL-XL    45 tablet    Take 0.5 tablets (12.5 mg) by mouth daily    Coronary artery disease involving native coronary artery of native heart without angina pectoris       multivitamin, therapeutic Tabs tablet      Take 1 tablet by mouth daily        nitroGLYcerin 0.4 MG sublingual tablet    NITROSTAT    30 tablet    Place 1 tablet (0.4 mg) under the tongue every 5 minutes as needed for chest pain    Coronary artery disease involving native coronary artery of native heart without angina pectoris       omeprazole 20 MG tablet      Take 20 mg by mouth daily.        * order for DME     1 Units    Accu-check glucometer or per insurance coverage    Type 2 diabetes mellitus without complication (H)       * order for DME     3 Month    Accu-check glucometer test lancets, or per insurance coverage Testing once daily    Type 2 diabetes mellitus without complication (H)       * order for DME     3 Month    Accu-check glucometer test strips or per insurance coverage Testing once daily    Type 2 diabetes mellitus without complication (H)       pioglitazone 15 MG tablet    ACTOS    90 tablet    TAKE 1 TABLET EVERY DAY    Type 2 diabetes mellitus with hyperglycemia, without long-term current use of insulin (H)       sitagliptin 100 MG tablet    JANUVIA    90 tablet    Take 1 tablet (100 mg) by mouth daily Reference #[id number]    Type 2 diabetes mellitus with hyperglycemia,  without long-term current use of insulin (H)       tamsulosin 0.4 MG capsule    FLOMAX    90 capsule    TAKE 1 CAPSULE EVERY DAY    Prostate cancer (H)       * Notice:  This list has 3 medication(s) that are the same as other medications prescribed for you. Read the directions carefully, and ask your doctor or other care provider to review them with you.

## 2018-11-26 NOTE — TELEPHONE ENCOUNTER
We have attempted to contact this patient multiple times to set up a MTM follow up appointment and were unsuccessful. We will no longer continue to contact this patient to schedule a visit at this time. Please refer back to MTM if you believe this patient would continue to benefit from our services.     Thank you!    Jeannette Smith, PharmD [facility]  Medication Therapy Management Practitioner   #[phone]

## 2018-11-27 ENCOUNTER — MYC MEDICAL ADVICE (OUTPATIENT)
Dept: PEDIATRICS | Facility: CLINIC | Age: 68
End: 2018-11-27

## 2018-11-27 DIAGNOSIS — N30.00 ACUTE CYSTITIS WITHOUT HEMATURIA: Primary | ICD-10-CM

## 2018-11-28 RX ORDER — CEFDINIR 300 MG/1
300 CAPSULE ORAL 2 TIMES DAILY
Qty: 20 CAPSULE | Refills: 0 | Status: SHIPPED | OUTPATIENT
Start: 2018-11-28 | End: 2019-05-06

## 2018-11-28 NOTE — TELEPHONE ENCOUNTER
Patient was treated with 5 day course of Macrobid on 11/20.  Last office appointment 11/26 for diabetic check.  Symptoms have now recurred.  Would you like patient to have another visit/E-visit?  RALPH Rodriguez RN

## 2018-11-28 NOTE — TELEPHONE ENCOUNTER
Patient called he wants a call back as soon as possible and would like a refill on the antibiotic if possible as symptoms are back

## 2018-11-28 NOTE — TELEPHONE ENCOUNTER
Pt took the Abx until Saturday when the script was up. And the symptoms came back yesterday and today is a little worse. Please call    Home Phone [phone]   Mobile [phone]

## 2018-11-28 NOTE — TELEPHONE ENCOUNTER
Discussed with pt  ucx reviewed: pos citrobacter  Recommended 10 day course of cefdinir  Pt to call in next 48hrs if no improvement or if sx recur after abx course

## 2018-12-24 DIAGNOSIS — E11.65 TYPE 2 DIABETES MELLITUS WITH HYPERGLYCEMIA, WITHOUT LONG-TERM CURRENT USE OF INSULIN (H): ICD-10-CM

## 2018-12-25 NOTE — TELEPHONE ENCOUNTER
"Requested Prescriptions   Pending Prescriptions Disp Refills     pioglitazone (ACTOS) 15 MG tablet [Pharmacy Med Name: PIOGLITAZONE HCL 15 MG Tablet] 90 tablet 1    Last Written Prescription Date:  07/24/2018  Last Fill Quantity: 90 TABLET,  # refills: 1   Last office visit: 11/26/2018 with prescribing provider:  11/26/2018   Future Office Visit:     Sig: TAKE 1 TABLET EVERY DAY    Thiazolidinedione Agents (TZDs)  Passed - 12/24/2018  2:49 PM       Passed - Blood pressure less than 140/90 in past 6 months    BP Readings from Last 3 Encounters:   11/26/18 128/70   11/20/18 138/62   06/07/18 136/60                Passed - Patient has documented LDL within the past 12 mos.    Recent Labs   Lab Test 11/13/18  0757   LDL 27            Passed - Patient has a normal ALT within the past 12 mos.    Recent Labs   Lab Test 01/16/18  0907   ALT 19            Passed - Patient has a normal AST within the past 12 mos.     Recent Labs   Lab Test 01/16/18  0907   AST 17            Passed - Patient has had a Microalbumin in the past 12 mos.    Recent Labs   Lab Test 11/13/18  0758   MICROL 235   UMALCR 169.06*            Passed - Patient has documented A1c within the specified period of time.    If HgbA1C is 8 or greater, it needs to be on file within the past 3 months.  If less than 8, must be on file within the past 6 months.     Recent Labs   Lab Test 11/13/18  0757   A1C 7.2*            Passed - Diagnosis not CHF       Passed - Patient is age 18 or older       Passed - Patient has a normal serum Creatinine in the past 12 months    Recent Labs   Lab Test 02/22/18  0806 01/16/18  0907   CR  --  1.12   CREAT 1.0  --             Passed - Recent (6 mo) or future (30 days) visit within the authorizing provider's specialty    Patient had office visit in the last 6 months or has a visit in the next 30 days with authorizing provider or within the authorizing provider's specialty.  See \"Patient Info\" tab in inbasket, or \"Choose Columns\" in " Meds & Orders section of the refill encounter.            Joey JIMENEZT

## 2018-12-26 RX ORDER — PIOGLITAZONEHYDROCHLORIDE 15 MG/1
TABLET ORAL
Qty: 90 TABLET | Refills: 0 | Status: SHIPPED | OUTPATIENT
Start: 2018-12-26 | End: 2019-04-25

## 2018-12-26 NOTE — TELEPHONE ENCOUNTER
Return in about 3 months (around 2/26/2019) for Diabetes follow-up, Patient wishes to call to schedule.    Prescription approved per [facility] Refill Protocol.  Ailyn Ventura RN  Message handled by Nurse Triage.

## 2019-01-29 ENCOUNTER — TELEPHONE (OUTPATIENT)
Dept: PEDIATRICS | Facility: CLINIC | Age: 69
End: 2019-01-29

## 2019-01-29 DIAGNOSIS — C61 PROSTATE CANCER (H): ICD-10-CM

## 2019-01-29 RX ORDER — TAMSULOSIN HYDROCHLORIDE 0.4 MG/1
0.4 CAPSULE ORAL DAILY
Qty: 90 CAPSULE | Refills: 0 | Status: SHIPPED | OUTPATIENT
Start: 2019-01-29 | End: 2019-05-06

## 2019-01-29 NOTE — TELEPHONE ENCOUNTER
I called patient and the only RX he needs filled now is the Tamsulosin.  Tamsulosin 0.4 mg  Last Written Prescription Date:  10/18/18  Last Fill Quantity: 90,  # refills: 1   Last office visit: 11/26/2018 with prescribing provider:  11/26/18   Future Office Visit:    Patient has one refill left on this RX.  Remaining refills sent to [facility] per patient request.  He will call me later as he needs refills on other medications.  I advised that patient give us about a two week notice for the mail order RXs.  No further questions.  Will call back if any other questions or concerns.  RALPH Rodriguez RN

## 2019-01-29 NOTE — TELEPHONE ENCOUNTER
Reason for Call:  Other pharmacy change    Detailed comments: pt is calling and needs a list of his medications sent to his new pharmacy. He now has [employer] insurance and they use [facility] mail order.    [facility] [place] [place] - [address] [facility] [address] [facility] [address]    Phone Number Patient can be reached at: Home number on file [phone] (home)    Best Time: any    Can we leave a detailed message on this number? YES    Call taken on 1/29/2019 at 2:29 PM by Twyla James

## 2019-02-12 DIAGNOSIS — I10 ESSENTIAL HYPERTENSION WITH GOAL BLOOD PRESSURE LESS THAN 140/90: ICD-10-CM

## 2019-02-12 RX ORDER — LISINOPRIL 10 MG/1
10 TABLET ORAL DAILY
Qty: 90 TABLET | Refills: 3 | Status: SHIPPED | OUTPATIENT
Start: 2019-02-12 | End: 2019-10-31

## 2019-02-12 NOTE — TELEPHONE ENCOUNTER
Patient called back and asked to speak to Cynthia. Please call him back regarding his medications.

## 2019-02-12 NOTE — TELEPHONE ENCOUNTER
Patient calling for refills of medication.  Please see refill encounter from today.  RALPH Rodriguez RN

## 2019-02-12 NOTE — TELEPHONE ENCOUNTER
"Lisinopril 10 mg  Routing refill request to provider for review/approval because:  Labs not current:  BMP  Patient uses mail order pharmacy and needs a 3 month supply-unable to do this per protocol.    Last Written Prescription Date:  01/16/18  Last Fill Quantity: 90,  # refills: 3   Last office visit: 11/26/2018 with prescribing provider:  11/26/18   Future Office Visit:      Requested Prescriptions   Pending Prescriptions Disp Refills     lisinopril (PRINIVIL/ZESTRIL) 10 MG tablet 90 tablet 3     Sig: Take 1 tablet (10 mg) by mouth daily    ACE Inhibitors (Including Combos) Protocol Failed - 2/12/2019  3:36 PM       Failed - Normal serum potassium on file in past 12 months    Recent Labs   Lab Test 01/16/18  0907   POTASSIUM 4.5            Passed - Blood pressure under 140/90 in past 12 months    BP Readings from Last 3 Encounters:   11/26/18 128/70   11/20/18 138/62   06/07/18 136/60                Passed - Recent (12 mo) or future (30 days) visit within the authorizing provider's specialty    Patient had office visit in the last 12 months or has a visit in the next 30 days with authorizing provider or within the authorizing provider's specialty.  See \"Patient Info\" tab in inbasket, or \"Choose Columns\" in Meds & Orders section of the refill encounter.             Passed - Medication is active on med list       Passed - Patient is age 18 or older       Passed - Normal serum creatinine on file in past 12 months    Recent Labs   Lab Test 02/22/18  0806 01/16/18  0907   CR  --  1.12   CREAT 1.0  --            RALPH Rodriguez RN  "

## 2019-02-20 ENCOUNTER — TRANSFERRED RECORDS (OUTPATIENT)
Dept: HEALTH INFORMATION MANAGEMENT | Facility: CLINIC | Age: 69
End: 2019-02-20

## 2019-02-25 DIAGNOSIS — I25.10 CORONARY ARTERY DISEASE INVOLVING NATIVE CORONARY ARTERY OF NATIVE HEART WITHOUT ANGINA PECTORIS: ICD-10-CM

## 2019-02-25 DIAGNOSIS — E78.5 HYPERLIPIDEMIA WITH TARGET LDL LESS THAN 70: ICD-10-CM

## 2019-02-28 RX ORDER — ATORVASTATIN CALCIUM 20 MG/1
20 TABLET, FILM COATED ORAL DAILY
Qty: 90 TABLET | Refills: 2 | Status: SHIPPED | OUTPATIENT
Start: 2019-02-28 | End: 2019-09-11

## 2019-02-28 RX ORDER — METOPROLOL SUCCINATE 25 MG/1
12.5 TABLET, EXTENDED RELEASE ORAL DAILY
Qty: 45 TABLET | Refills: 2 | Status: SHIPPED | OUTPATIENT
Start: 2019-02-28 | End: 2019-09-11

## 2019-02-28 NOTE — TELEPHONE ENCOUNTER
"  Prescription approved per [facility], [facility] or [facility] refill protocol.  Marilu ROSAS RN - [facility]        Requested Prescriptions   Pending Prescriptions Disp Refills     atorvastatin (LIPITOR) 20 MG tablet 90 tablet 3     Sig: Take 1 tablet (20 mg) by mouth daily    Statins Protocol Passed - 2/25/2019 10:49 AM       Passed - LDL on file in past 12 months    Recent Labs   Lab Test 11/13/18  0757   LDL 27            Passed - No abnormal creatine kinase in past 12 months    No lab results found.            Passed - Recent (12 mo) or future (30 days) visit within the authorizing provider's specialty    Patient had office visit in the last 12 months or has a visit in the next 30 days with authorizing provider or within the authorizing provider's specialty.  See \"Patient Info\" tab in inbasket, or \"Choose Columns\" in Meds & Orders section of the refill encounter.             Passed - Medication is active on med list       Passed - Patient is age 18 or older        metoprolol succinate ER (TOPROL-XL) 25 MG 24 hr tablet 45 tablet 3     Sig: Take 0.5 tablets (12.5 mg) by mouth daily    Beta-Blockers Protocol Passed - 2/25/2019 10:49 AM       Passed - Blood pressure under 140/90 in past 12 months    BP Readings from Last 3 Encounters:   11/26/18 128/70   11/20/18 138/62   06/07/18 136/60                Passed - Patient is age 6 or older       Passed - Recent (12 mo) or future (30 days) visit within the authorizing provider's specialty    Patient had office visit in the last 12 months or has a visit in the next 30 days with authorizing provider or within the authorizing provider's specialty.  See \"Patient Info\" tab in inbasket, or \"Choose Columns\" in Meds & Orders section of the refill encounter.             Passed - Medication is active on med list          "

## 2019-04-22 ENCOUNTER — TELEPHONE (OUTPATIENT)
Dept: PEDIATRICS | Facility: CLINIC | Age: 69
End: 2019-04-22

## 2019-04-22 DIAGNOSIS — E11.65 TYPE 2 DIABETES MELLITUS WITH HYPERGLYCEMIA, WITHOUT LONG-TERM CURRENT USE OF INSULIN (H): ICD-10-CM

## 2019-04-22 NOTE — TELEPHONE ENCOUNTER
"Requested Prescriptions   Pending Prescriptions Disp Refills     metFORMIN (GLUCOPHAGE) 1000 MG tablet  Last Written Prescription Date:  11/26/18  Last Fill Quantity: 180,  # refills: 1    Last office visit: 11/26/2018 with prescribing provider:  Jesus Cherry MD        Future Office Visit:     180 tablet 1     Sig: Take 1 tablet (1,000 mg) by mouth 2 times daily (with meals)       Biguanide Agents Failed - 4/22/2019  1:24 PM        Failed - Patient's CR is NOT>1.4 OR Patient's EGFR is NOT<45 within past 12 mos.     Recent Labs   Lab Test 02/22/18  0806   GFRESTIMATED 74   GFRESTBLACK 90       Recent Labs   Lab Test 01/16/18  0907   CR 1.12             Passed - Blood pressure less than 140/90 in past 6 months     BP Readings from Last 3 Encounters:   11/26/18 128/70   11/20/18 138/62   06/07/18 136/60                 Passed - Patient has documented LDL within the past 12 mos.     Recent Labs   Lab Test 11/13/18  0757   LDL 27             Passed - Patient has had a Microalbumin in the past 15 mos.     Recent Labs   Lab Test 11/13/18  0758   MICROL 235   UMALCR 169.06*             Passed - Patient is age 10 or older        Passed - Patient has documented A1c within the specified period of time.     If HgbA1C is 8 or greater, it needs to be on file within the past 3 months.  If less than 8, must be on file within the past 6 months.     Recent Labs   Lab Test 11/13/18  0757   A1C 7.2*             Passed - Patient does NOT have a diagnosis of CHF.        Passed - Medication is active on med list        Passed - Recent (6 mo) or future (30 days) visit within the authorizing provider's specialty     Patient had office visit in the last 6 months or has a visit in the next 30 days with authorizing provider or within the authorizing provider's specialty.  See \"Patient Info\" tab in inbasket, or \"Choose Columns\" in Meds & Orders section of the refill encounter.            pioglitazone (ACTOS) 15 MG tablet  Last Written " "Prescription Date:  12/26/18  Last Fill Quantity: 90,  # refills: 0    Last office visit: 11/26/2018 with prescribing provider:  Jesus Cherry MD        Future Office Visit:     90 tablet 0       Thiazolidinedione Agents (TZDs)  Failed - 4/22/2019  1:24 PM        Failed - Patient has a normal ALT within the past 12 mos.     Recent Labs   Lab Test 01/16/18  0907   ALT 19             Failed - Patient has a normal AST within the past 12 mos.      Recent Labs   Lab Test 01/16/18  0907   AST 17             Failed - Patient has a normal serum Creatinine in the past 12 months     Recent Labs   Lab Test 02/22/18  0806 01/16/18  0907   CR  --  1.12   CREAT 1.0  --              Passed - Blood pressure less than 140/90 in past 6 months     BP Readings from Last 3 Encounters:   11/26/18 128/70   11/20/18 138/62   06/07/18 136/60                 Passed - Patient has documented LDL within the past 12 mos.     Recent Labs   Lab Test 11/13/18  0757   LDL 27             Passed - Patient has had a Microalbumin in the past 12 mos.     Recent Labs   Lab Test 11/13/18  0758   MICROL 235   UMALCR 169.06*             Passed - Patient has documented A1c within the specified period of time.     If HgbA1C is 8 or greater, it needs to be on file within the past 3 months.  If less than 8, must be on file within the past 6 months.     Recent Labs   Lab Test 11/13/18  0757   A1C 7.2*             Passed - Diagnosis not CHF        Passed - Medication is active on med list        Passed - Patient is age 18 or older        Passed - Recent (6 mo) or future (30 days) visit within the authorizing provider's specialty     Patient had office visit in the last 6 months or has a visit in the next 30 days with authorizing provider or within the authorizing provider's specialty.  See \"Patient Info\" tab in inbasket, or \"Choose Columns\" in Meds & Orders section of the refill encounter.              "

## 2019-04-25 RX ORDER — PIOGLITAZONEHYDROCHLORIDE 15 MG/1
TABLET ORAL
Qty: 30 TABLET | Refills: 0 | Status: SHIPPED | OUTPATIENT
Start: 2019-04-25 | End: 2019-05-06

## 2019-04-25 NOTE — TELEPHONE ENCOUNTER
Medication is being filled for a one time refill only as patient is due for DM follow up.  30 day refill given.  Please call and assist with scheduling appointment prior to next refill   Marilu ROSAS RN - Triage  [facility]

## 2019-05-06 ENCOUNTER — OFFICE VISIT (OUTPATIENT)
Dept: PEDIATRICS | Facility: CLINIC | Age: 69
End: 2019-05-06
Payer: COMMERCIAL

## 2019-05-06 VITALS
DIASTOLIC BLOOD PRESSURE: 64 MMHG | HEART RATE: 82 BPM | HEIGHT: 69 IN | SYSTOLIC BLOOD PRESSURE: 144 MMHG | OXYGEN SATURATION: 96 % | BODY MASS INDEX: 37.18 KG/M2 | TEMPERATURE: 98.6 F | WEIGHT: 251 LBS

## 2019-05-06 DIAGNOSIS — E78.5 HYPERLIPIDEMIA WITH TARGET LDL LESS THAN 70: ICD-10-CM

## 2019-05-06 DIAGNOSIS — E11.65 TYPE 2 DIABETES MELLITUS WITH HYPERGLYCEMIA, WITHOUT LONG-TERM CURRENT USE OF INSULIN (H): Primary | ICD-10-CM

## 2019-05-06 DIAGNOSIS — I10 ESSENTIAL HYPERTENSION WITH GOAL BLOOD PRESSURE LESS THAN 140/90: ICD-10-CM

## 2019-05-06 DIAGNOSIS — L57.0 ACTINIC KERATOSIS: ICD-10-CM

## 2019-05-06 DIAGNOSIS — C61 PROSTATE CANCER (H): ICD-10-CM

## 2019-05-06 LAB
ALBUMIN SERPL-MCNC: 4 G/DL (ref 3.4–5)
ALP SERPL-CCNC: 79 U/L (ref 40–150)
ALT SERPL W P-5'-P-CCNC: 43 U/L (ref 0–70)
ANION GAP SERPL CALCULATED.3IONS-SCNC: 9 MMOL/L (ref 3–14)
AST SERPL W P-5'-P-CCNC: 23 U/L (ref 0–45)
BILIRUB SERPL-MCNC: 0.4 MG/DL (ref 0.2–1.3)
BUN SERPL-MCNC: 18 MG/DL (ref 7–30)
CALCIUM SERPL-MCNC: 9.5 MG/DL (ref 8.5–10.1)
CHLORIDE SERPL-SCNC: 103 MMOL/L (ref 94–109)
CHOLEST SERPL-MCNC: 145 MG/DL
CO2 SERPL-SCNC: 24 MMOL/L (ref 20–32)
CREAT SERPL-MCNC: 0.89 MG/DL (ref 0.66–1.25)
GFR SERPL CREATININE-BSD FRML MDRD: 86 ML/MIN/{1.73_M2}
GLUCOSE SERPL-MCNC: 184 MG/DL (ref 70–99)
HBA1C MFR BLD: 7.5 % (ref 0–5.6)
HDLC SERPL-MCNC: 53 MG/DL
LDLC SERPL CALC-MCNC: 56 MG/DL
NONHDLC SERPL-MCNC: 92 MG/DL
POTASSIUM SERPL-SCNC: 4.5 MMOL/L (ref 3.4–5.3)
PROT SERPL-MCNC: 7.6 G/DL (ref 6.8–8.8)
SODIUM SERPL-SCNC: 136 MMOL/L (ref 133–144)
TRIGL SERPL-MCNC: 182 MG/DL
TSH SERPL DL<=0.005 MIU/L-ACNC: 2.03 MU/L (ref 0.4–4)

## 2019-05-06 PROCEDURE — 83036 HEMOGLOBIN GLYCOSYLATED A1C: CPT | Performed by: INTERNAL MEDICINE

## 2019-05-06 PROCEDURE — 99214 OFFICE O/P EST MOD 30 MIN: CPT | Mod: 25 | Performed by: INTERNAL MEDICINE

## 2019-05-06 PROCEDURE — 17000 DESTRUCT PREMALG LESION: CPT | Performed by: INTERNAL MEDICINE

## 2019-05-06 PROCEDURE — 80053 COMPREHEN METABOLIC PANEL: CPT | Performed by: INTERNAL MEDICINE

## 2019-05-06 PROCEDURE — 80061 LIPID PANEL: CPT | Performed by: INTERNAL MEDICINE

## 2019-05-06 PROCEDURE — 84443 ASSAY THYROID STIM HORMONE: CPT | Performed by: INTERNAL MEDICINE

## 2019-05-06 PROCEDURE — 36415 COLL VENOUS BLD VENIPUNCTURE: CPT | Performed by: INTERNAL MEDICINE

## 2019-05-06 RX ORDER — GLIPIZIDE 5 MG/1
5 TABLET, FILM COATED, EXTENDED RELEASE ORAL
Qty: 90 TABLET | Refills: 1 | Status: CANCELLED | OUTPATIENT
Start: 2019-05-06

## 2019-05-06 RX ORDER — TAMSULOSIN HYDROCHLORIDE 0.4 MG/1
0.4 CAPSULE ORAL DAILY
Qty: 90 CAPSULE | Refills: 3 | Status: SHIPPED | OUTPATIENT
Start: 2019-05-06 | End: 2019-10-31

## 2019-05-06 RX ORDER — PIOGLITAZONEHYDROCHLORIDE 15 MG/1
TABLET ORAL
Qty: 90 TABLET | Refills: 1 | Status: SHIPPED | OUTPATIENT
Start: 2019-05-06 | End: 2019-10-31

## 2019-05-06 ASSESSMENT — MIFFLIN-ST. JEOR: SCORE: 1888.91

## 2019-05-06 NOTE — PATIENT INSTRUCTIONS
Stop januvia  Stop glipizide  Start Jardiance  Please call in or email home blood sugars after 2 weeks on Jardiance     (prefer both before breakfast and before dinner readings)  Next diabetes visit 3 months

## 2019-05-06 NOTE — PROGRESS NOTES
SUBJECTIVE:   Héctor Quispe is a 68 year old male who presents to clinic today for the following   health issues:    Diabetes Follow-up      Patient is checking blood sugars: rarely, recent readings 180's    Diabetic concerns: None.   Went on vacation/diet has been off        Questions about jardiance     Symptoms of hypoglycemia (low blood sugar): none     Paresthesias (numbness or burning in feet) or sores: No    Diabetes Management Resources    Hyperlipidemia Follow-Up      Rate your low fat/cholesterol diet?: good    Taking statin?  Yes, no muscle aches from statin    Other lipid medications/supplements?:  none    Hypertension Follow-up      Outpatient blood pressures are not being checked.    Low Salt Diet: no added salt    BP Readings from Last 2 Encounters:   19 144/64   18 128/70     Hemoglobin A1C (%)   Date Value   2018 7.2 (H)   2018 8.0 (H)     LDL Cholesterol Calculated (mg/dL)   Date Value   2018 27   2018 33       Amount of exercise or physical activity: None    Problems taking medications regularly: No    Medication side effects: none    Diet: low salt and low fat/cholesterol      Patient Active Problem List   Diagnosis     Colon polyps     Chronic right shoulder pain- recurrent since      Cervical spinal stenosis     Type 2 diabetes mellitus with hyperglycemia, without long-term current use of insulin (H)     Essential hypertension with goal blood pressure less than 140/90     Hyperlipidemia with target LDL less than 70     Coronary artery disease involving native coronary artery of native heart without angina pectoris     Past Surgical History:   Procedure Laterality Date     CYSTOSCOPY       ENT SURGERY       prostate       PROSTATE SURGERY       STENT, CORONARY, LUIS M      h/o coronary stent x 2       Social History     Tobacco Use     Smoking status: Former Smoker     Types: Cigarettes     Last attempt to quit: 2002     Years since quittin.7      Smokeless tobacco: Never Used   Substance Use Topics     Alcohol use: Yes     Alcohol/week: 1.8 oz     Types: 3 Cans of beer per week     Family History   Problem Relation Age of Onset     Alzheimer Disease Mother      Cancer Father      Diabetes No family hx of      Coronary Artery Disease No family hx of      Hypertension No family hx of      Hyperlipidemia No family hx of      Cerebrovascular Disease No family hx of      Breast Cancer No family hx of      Colon Cancer No family hx of      Prostate Cancer No family hx of      Other Cancer No family hx of      Depression No family hx of      Anxiety Disorder No family hx of      Mental Illness No family hx of      Substance Abuse No family hx of      Anesthesia Reaction No family hx of      Asthma No family hx of      Osteoporosis No family hx of      Genetic Disorder No family hx of      Thyroid Disease No family hx of      Obesity No family hx of      Unknown/Adopted No family hx of          Current Outpatient Medications   Medication Sig Dispense Refill     aspirin 81 MG tablet Take 1 tablet (81 mg) by mouth daily 90 tablet 11     atorvastatin (LIPITOR) 20 MG tablet Take 1 tablet (20 mg) by mouth daily 90 tablet 2     blood glucose (NO BRAND SPECIFIED) test strip Accucheck melinda test strips.   Use to test blood sugar 1 times daily or as directed. 100 strip 11     docusate sodium (COLACE) 100 MG capsule Take 100 mg by mouth daily as needed for constipation               glipiZIDE (GLIPIZIDE XL) 5 MG 24 hr tablet Take 1 tablet (5 mg) by mouth daily (with breakfast) 90 tablet 1     lisinopril (PRINIVIL/ZESTRIL) 10 MG tablet Take 1 tablet (10 mg) by mouth daily 90 tablet 3     metFORMIN (GLUCOPHAGE) 1000 MG tablet Take 1 tablet (1,000 mg) by mouth 2 times daily (with meals) 180 tablet 1     metoprolol succinate ER (TOPROL-XL) 25 MG 24 hr tablet Take 0.5 tablets (12.5 mg) by mouth daily 45 tablet 2     multivitamin, therapeutic (THERA-VIT) TABS tablet Take 1 tablet  "by mouth daily       nitroglycerin (NITROSTAT) 0.4 MG SL tablet Place 1 tablet (0.4 mg) under the tongue every 5 minutes as needed for chest pain 30 tablet 6     omeprazole 20 MG tablet Take 20 mg by mouth daily.       order for DME Accu-check glucometer or per insurance coverage 1 Units 0     order for DME Accu-check glucometer test lancets, or per insurance coverage  Testing once daily 3 Month 3     order for DME Accu-check glucometer test strips or per insurance coverage  Testing once daily 3 Month 3     pioglitazone (ACTOS) 15 MG tablet TAKE 1 TABLET EVERY DAY 90 tablet 1     sitagliptin (JANUVIA) 100 MG tablet Take 1 tablet (100 mg) by mouth daily Reference #[id number] 90 tablet 1     tamsulosin (FLOMAX) 0.4 MG capsule Take 1 capsule (0.4 mg) by mouth daily 90 capsule 3       ROS: The following systems have been completely reviewed and are negative except as noted in the HPI: CONSTITUTIONAL, CARDIOVASCULAR, PULMONARY,  DERMATOLOGIC-skin lesion on scalp for few months    OBJECTIVE:                                                    /64 (Cuff Size: Adult Large)   Pulse 82   Temp 98.6  F (37  C) (Oral)   Ht 1.753 m (5' 9\")   Wt 113.9 kg (251 lb)   SpO2 96%   BMI 37.07 kg/m   Body mass index is 37.07 kg/m .  GENERAL:  alert,  no distress  HENT:  oropharynx-normal  NECK: no tenderness, no adenopathy  RESP: lungs clear to auscultation - no rales, no rhonchi, no wheezes  CV: regular rates and rhythm, normal S1 S2, no S3 or S4 and no murmur, no click or rub  MS: extremities- no edema  Derm: 5mm crusted tan plaque on scalp     ASSESSMENT/PLAN:                                                        ICD-10-CM    1. Type 2 diabetes mellitus with hyperglycemia, without long-term current use of insulin (H) E11.65 Hemoglobin A1c     TSH with free T4 reflex     metFORMIN (GLUCOPHAGE) 1000 MG tablet     pioglitazone (ACTOS) 15 MG tablet     blood glucose (NO BRAND SPECIFIED) test strip     empagliflozin (JARDIANCE) 10 " MG TABS tablet      Worsening blood sugar control by history.  Patient requests trial of Jardiance-plans to order from [facility] which has been cheaper.  Recommend discontinue glipizide, discontinue Januvia.  Start trial of Jardiance 10 mg, continue Actos 15, metformin 2000.  Patient will call or email home blood sugar readings after 2 weeks on the new regimen.  Side effects reviewed including risk of recurrent urinary tract infection, urinary symptoms.  Encouraged adequate fluid intake.     2. Essential hypertension with goal blood pressure less than 140/90 I10  systolic pressure slightly above goal, recheck at next follow-up 3 months.     3. Hyperlipidemia with target LDL less than 70 E78.5 Lipid panel reflex to direct LDL Fasting     Comprehensive metabolic panel (BMP + Alb, Alk Phos, ALT, AST, Total. Bili, TP)     Well-controlled.     4. Prostate cancer (H) C61 tamsulosin (FLOMAX) 0.4 MG capsule      History of prostate cancer previously treated with brachytherapy     5. Actinic keratosis L57.0 Dx discussed.  PROCEDURE:  AK treatment TREATMENT-LIQUID NITROGEN  Treatment discussed with patient in detail.  Liquid nitrogen is then applied in 2 freeze/thaw cycles.  Patient tolerated this procedure well.  Wound care instructions discussed.  Follow-up in 2-3 weeks if persistent.        Jesus Cherry MD  [facility]

## 2019-05-17 DIAGNOSIS — E11.65 TYPE 2 DIABETES MELLITUS WITH HYPERGLYCEMIA, WITHOUT LONG-TERM CURRENT USE OF INSULIN (H): ICD-10-CM

## 2019-05-17 NOTE — TELEPHONE ENCOUNTER
"Requested Prescriptions   Pending Prescriptions Disp Refills     glipiZIDE (GLIPIZIDE XL) 5 MG 24 hr tablet  Last Written Prescription Date:  11/26/2018  Last Fill Quantity: 90 tablet,  # refills: 1    Last Office Visit: 5/6/2019 Jesus Cherry MD       Future Office Visit:      90 tablet 1     Sig: Take 1 tablet (5 mg) by mouth daily (with breakfast)       Sulfonylurea Agents Failed - 5/17/2019 11:46 AM        Failed - Blood pressure less than 140/90 in past 6 months     BP Readings from Last 3 Encounters:   05/06/19 144/64   11/26/18 128/70   11/20/18 138/62                 Passed - Patient has documented LDL within the past 12 mos.     Recent Labs   Lab Test 05/06/19  0938   LDL 56             Passed - Patient has had a Microalbumin in the past 15 mos.     Recent Labs   Lab Test 11/13/18  0758   MICROL 235   UMALCR 169.06*             Passed - Patient has documented A1c within the specified period of time.     If HgbA1C is 8 or greater, it needs to be on file within the past 3 months.  If less than 8, must be on file within the past 6 months.     Recent Labs   Lab Test 05/06/19  0938   A1C 7.5*             Passed - Medication is active on med list        Passed - Patient is age 18 or older        Passed - Patient has a recent creatinine (normal) within the past 12 mos.     Recent Labs   Lab Test 05/06/19  0938 02/22/18  0806   CR 0.89  --    CREAT  --  1.0             Passed - Recent (6 mo) or future (30 days) visit within the authorizing provider's specialty     Patient had office visit in the last 6 months or has a visit in the next 30 days with authorizing provider or within the authorizing provider's specialty.  See \"Patient Info\" tab in inbasket, or \"Choose Columns\" in Meds & Orders section of the refill encounter.              "

## 2019-05-19 RX ORDER — GLIPIZIDE 5 MG/1
5 TABLET, FILM COATED, EXTENDED RELEASE ORAL
Qty: 90 TABLET | Refills: 1 | OUTPATIENT
Start: 2019-05-19

## 2019-05-19 NOTE — TELEPHONE ENCOUNTER
Routing refill request to provider for review/approval because:  Discontinued at last office visit?

## 2019-05-20 NOTE — TELEPHONE ENCOUNTER
Please call pt  As per recent  visit we planned to stop glipizide and Januvia.  Pt is planning to start Jardiance.        So glipizide not refilled--need his home blood sugar readings after 2 weeks on the new regimen: jardiance 10mg, Actos 15mg and metformin

## 2019-05-22 NOTE — TELEPHONE ENCOUNTER
Patient called the clinic stating that she is receiving the Jardiance through [facility] and they told him it could be 30 days before he receives his meds. Patient is asking for a 30 day supply of Glipizide to hold him over til he can get his Jardiance, then he will stop the Glipizide. Please call with any questions, [phone].

## 2019-05-23 ENCOUNTER — MYC REFILL (OUTPATIENT)
Dept: PEDIATRICS | Facility: CLINIC | Age: 69
End: 2019-05-23

## 2019-05-23 RX ORDER — GLIPIZIDE 5 MG/1
5 TABLET, FILM COATED, EXTENDED RELEASE ORAL
Qty: 30 TABLET | Refills: 1 | Status: SHIPPED | OUTPATIENT
Start: 2019-05-23 | End: 2019-07-22

## 2019-05-23 NOTE — TELEPHONE ENCOUNTER
Notified patient via [facility] that his prescription will be at Pharmacy and they will call when it is ready to be picked up.     Gita Cazares CMA on 4/23/2019 at 9:32 AM

## 2019-06-06 NOTE — TELEPHONE ENCOUNTER
Sent a MC message to pt requesting him to send us the home BG readings.     Notes from  on 5/19:  Please call pt  As per recent  visit we planned to stop glipizide and Januvia.  Pt is planning to start Jardiance.        So glipizide not refilled--need his home blood sugar readings after 2 weeks on the new regimen: jardiance 10mg, Actos 15mg and metformin    [email or web address], RN  Triage Nurse

## 2019-06-26 NOTE — TELEPHONE ENCOUNTER
Pt haven't read our  message from 6/6. So, called pt at [phone], not available, so LM to call us back.     Will await for pt's response.    Lee, RN  Triage Nurse

## 2019-07-02 NOTE — TELEPHONE ENCOUNTER
Pt haven't been returning our calls or MC messages. Provider wanted to get BG values after changing his diabetic meds(stopped glipizide & januvia, then started jardiance).     Please mail a letter to his home address requesting pt to send us the BG readings. Thanks.     Lee, RN  Triage Nurse

## 2019-07-03 DIAGNOSIS — E11.65 TYPE 2 DIABETES MELLITUS WITH HYPERGLYCEMIA, WITHOUT LONG-TERM CURRENT USE OF INSULIN (H): ICD-10-CM

## 2019-07-03 NOTE — TELEPHONE ENCOUNTER
Notified patient. He said mail order never sent the strips so hasn't been checking his BG. Requested new order sent to [facility]. Then he will send in values.  [facility]

## 2019-07-03 NOTE — TELEPHONE ENCOUNTER
Notified patient. Strips were ordered 5/6/19 but patient never received them from the mail order pharmacy.  Patient requested refill sent to Clary Rowland CMA

## 2019-07-08 ENCOUNTER — TELEPHONE (OUTPATIENT)
Dept: PEDIATRICS | Facility: CLINIC | Age: 69
End: 2019-07-08

## 2019-07-08 DIAGNOSIS — E11.65 TYPE 2 DIABETES MELLITUS WITH HYPERGLYCEMIA, WITHOUT LONG-TERM CURRENT USE OF INSULIN (H): Primary | ICD-10-CM

## 2019-07-08 RX ORDER — BLOOD-GLUCOSE CONTROL, NORMAL
EACH MISCELLANEOUS
Qty: 1 EACH | Refills: 11 | Status: SHIPPED | OUTPATIENT
Start: 2019-07-08 | End: 2023-09-05

## 2019-07-08 RX ORDER — LANCETS
EACH MISCELLANEOUS
Qty: 100 EACH | Refills: 11 | Status: SHIPPED | OUTPATIENT
Start: 2019-07-08 | End: 2023-09-05

## 2019-07-08 NOTE — TELEPHONE ENCOUNTER
[facility] calling they received the refill for the Accucheck strips and meter on 7/3 but patients insurance no longer covers this brand. They are requesting a new prescription be sent to them for Contour Meter, strips and lantus. Please contact pharmacy with questions.

## 2019-07-22 ENCOUNTER — TELEPHONE (OUTPATIENT)
Dept: PEDIATRICS | Facility: CLINIC | Age: 69
End: 2019-07-22

## 2019-07-22 DIAGNOSIS — E11.65 TYPE 2 DIABETES MELLITUS WITH HYPERGLYCEMIA, WITHOUT LONG-TERM CURRENT USE OF INSULIN (H): Primary | ICD-10-CM

## 2019-07-22 NOTE — TELEPHONE ENCOUNTER
Discussed with patient.  Recent blood sugar readings reviewed: Morning readings generally 170-180 and evening readings 180-230.    Current regimen: Metformin 1000 mg twice daily, Actos 15 mg daily, Jardiance 10 mg daily.    Type 2 diabetes, hyperglycemia.  Recommend continue metformin, Actos.  Increase Jardiance from 10 mg to 20 mg daily and patient will email blood sugar readings in the next few weeks.    Patient had questions about resuming glipizide-that may be the next step if no significant improvement after the Jardiance increase

## 2019-08-09 ENCOUNTER — MYC MEDICAL ADVICE (OUTPATIENT)
Dept: PEDIATRICS | Facility: CLINIC | Age: 69
End: 2019-08-09

## 2019-08-09 DIAGNOSIS — E11.65 TYPE 2 DIABETES MELLITUS WITH HYPERGLYCEMIA, WITHOUT LONG-TERM CURRENT USE OF INSULIN (H): ICD-10-CM

## 2019-08-12 NOTE — TELEPHONE ENCOUNTER
See patient's message with recent blood sugar readings. Requesting refill of Jardiance. Pt increased to 20mg on 7/22 (see encounter). Blaine'd up refill with 20mg daily instructions. Pt requesting 25mg dose.    Requested Prescriptions   Pending Prescriptions Disp Refills     empagliflozin (JARDIANCE) 10 MG TABS tablet 180 tablet 1     Sig: Take 2 tablets (20 mg) by mouth daily       Hemoglobin A1C   Date Value Ref Range Status   05/06/2019 7.5 (H) 0 - 5.6 % Final     Comment:     Normal <5.7% Prediabetes 5.7-6.4%  Diabetes 6.5% or higher - adopted from ADA   consensus guidelines.     11/13/2018 7.2 (H) 0 - 5.6 % Final     Comment:     Normal <5.7% Prediabetes 5.7-6.4%  Diabetes 6.5% or higher - adopted from ADA   consensus guidelines.     04/12/2018 8.0 (H) 0 - 6.4 % Final     Comment:     Normal <5.7% Prediabetes 5.7-6.4%  Diabetes 6.5% or higher - adopted from ADA   consensus guidelines.  Results confirmed by repeat test               Routing refill request to provider for review/approval because:  Patient needs to be seen because:  Last OV 5/2019, patient due for DM f/u appointment

## 2019-09-10 ENCOUNTER — MYC MEDICAL ADVICE (OUTPATIENT)
Dept: PEDIATRICS | Facility: CLINIC | Age: 69
End: 2019-09-10

## 2019-09-10 DIAGNOSIS — E78.5 HYPERLIPIDEMIA WITH TARGET LDL LESS THAN 70: ICD-10-CM

## 2019-09-10 DIAGNOSIS — I25.10 CORONARY ARTERY DISEASE INVOLVING NATIVE CORONARY ARTERY OF NATIVE HEART WITHOUT ANGINA PECTORIS: ICD-10-CM

## 2019-09-10 DIAGNOSIS — E11.65 TYPE 2 DIABETES MELLITUS WITH HYPERGLYCEMIA, WITHOUT LONG-TERM CURRENT USE OF INSULIN (H): Primary | ICD-10-CM

## 2019-09-11 RX ORDER — METOPROLOL SUCCINATE 25 MG/1
12.5 TABLET, EXTENDED RELEASE ORAL DAILY
Qty: 45 TABLET | Refills: 1 | Status: SHIPPED | OUTPATIENT
Start: 2019-09-11 | End: 2019-10-31

## 2019-09-11 RX ORDER — ATORVASTATIN CALCIUM 20 MG/1
20 TABLET, FILM COATED ORAL DAILY
Qty: 90 TABLET | Refills: 1 | Status: SHIPPED | OUTPATIENT
Start: 2019-09-11 | End: 2019-10-31

## 2019-09-11 NOTE — TELEPHONE ENCOUNTER
"Dr. Cherry, please note blood sugar results from increasing Jardiance from 10 mg to 25 mg in 8/12/19.     Copied from Caregivers message:  \"Here are my blood readings for five days since I increased my dosage of jardiance.   9/4.           166.            127  9/5.           141.            187  9/6.           148.            142  9/7.           160.            166  9/8.           179.            163\"      Lab Results   Component Value Date    A1C 7.5 05/06/2019    A1C 7.2 11/13/2018    A1C 8.0 04/12/2018    A1C 6.3 01/16/2018    A1C 6.1 07/21/2017         Atorvastatin & Metoprolol approved per [facility] [place] [facility].    LOV: 5/6/19  BP Readings from Last 3 Encounters:   05/06/19 144/64   11/26/18 128/70   11/20/18 138/62     Lab Results   Component Value Date    CHOL 145 05/06/2019     Lab Results   Component Value Date    HDL 53 05/06/2019     Lab Results   Component Value Date    LDL 56 05/06/2019     Lab Results   Component Value Date    TRIG 182 05/06/2019     Lab Results   Component Value Date    CHOLHDLRATIO 1.8 05/06/2015       "

## 2019-09-25 ENCOUNTER — TRANSFERRED RECORDS (OUTPATIENT)
Dept: HEALTH INFORMATION MANAGEMENT | Facility: CLINIC | Age: 69
End: 2019-09-25

## 2019-09-27 ENCOUNTER — HEALTH MAINTENANCE LETTER (OUTPATIENT)
Age: 69
End: 2019-09-27

## 2019-10-31 ENCOUNTER — OFFICE VISIT (OUTPATIENT)
Dept: PEDIATRICS | Facility: CLINIC | Age: 69
End: 2019-10-31
Payer: COMMERCIAL

## 2019-10-31 VITALS
OXYGEN SATURATION: 96 % | BODY MASS INDEX: 33.82 KG/M2 | HEART RATE: 75 BPM | DIASTOLIC BLOOD PRESSURE: 70 MMHG | WEIGHT: 229 LBS | SYSTOLIC BLOOD PRESSURE: 122 MMHG | TEMPERATURE: 97.6 F

## 2019-10-31 DIAGNOSIS — E11.65 TYPE 2 DIABETES MELLITUS WITH HYPERGLYCEMIA, WITHOUT LONG-TERM CURRENT USE OF INSULIN (H): Primary | ICD-10-CM

## 2019-10-31 DIAGNOSIS — I10 ESSENTIAL HYPERTENSION WITH GOAL BLOOD PRESSURE LESS THAN 140/90: ICD-10-CM

## 2019-10-31 DIAGNOSIS — C61 PROSTATE CANCER (H): ICD-10-CM

## 2019-10-31 DIAGNOSIS — I25.10 CORONARY ARTERY DISEASE INVOLVING NATIVE CORONARY ARTERY OF NATIVE HEART WITHOUT ANGINA PECTORIS: ICD-10-CM

## 2019-10-31 DIAGNOSIS — E78.5 HYPERLIPIDEMIA WITH TARGET LDL LESS THAN 70: ICD-10-CM

## 2019-10-31 LAB — HBA1C MFR BLD: 8.4 % (ref 0–5.6)

## 2019-10-31 PROCEDURE — 83036 HEMOGLOBIN GLYCOSYLATED A1C: CPT | Performed by: INTERNAL MEDICINE

## 2019-10-31 PROCEDURE — 36415 COLL VENOUS BLD VENIPUNCTURE: CPT | Performed by: INTERNAL MEDICINE

## 2019-10-31 PROCEDURE — 80061 LIPID PANEL: CPT | Performed by: INTERNAL MEDICINE

## 2019-10-31 PROCEDURE — 82043 UR ALBUMIN QUANTITATIVE: CPT | Performed by: INTERNAL MEDICINE

## 2019-10-31 PROCEDURE — 84153 ASSAY OF PSA TOTAL: CPT | Performed by: INTERNAL MEDICINE

## 2019-10-31 PROCEDURE — 99214 OFFICE O/P EST MOD 30 MIN: CPT | Performed by: INTERNAL MEDICINE

## 2019-10-31 RX ORDER — PIOGLITAZONEHYDROCHLORIDE 15 MG/1
TABLET ORAL
Qty: 90 TABLET | Refills: 1 | Status: SHIPPED | OUTPATIENT
Start: 2019-10-31 | End: 2020-03-31

## 2019-10-31 RX ORDER — LISINOPRIL 10 MG/1
10 TABLET ORAL DAILY
Qty: 90 TABLET | Refills: 3 | Status: SHIPPED | OUTPATIENT
Start: 2019-10-31 | End: 2020-09-28

## 2019-10-31 RX ORDER — ATORVASTATIN CALCIUM 20 MG/1
20 TABLET, FILM COATED ORAL DAILY
Qty: 90 TABLET | Refills: 3 | Status: SHIPPED | OUTPATIENT
Start: 2019-10-31 | End: 2020-09-28

## 2019-10-31 RX ORDER — METOPROLOL SUCCINATE 25 MG/1
12.5 TABLET, EXTENDED RELEASE ORAL DAILY
Qty: 45 TABLET | Refills: 3 | Status: SHIPPED | OUTPATIENT
Start: 2019-10-31 | End: 2020-09-28

## 2019-10-31 RX ORDER — TAMSULOSIN HYDROCHLORIDE 0.4 MG/1
0.4 CAPSULE ORAL DAILY
Qty: 90 CAPSULE | Refills: 3 | Status: SHIPPED | OUTPATIENT
Start: 2019-10-31 | End: 2020-09-28

## 2019-10-31 ASSESSMENT — PATIENT HEALTH QUESTIONNAIRE - PHQ9: SUM OF ALL RESPONSES TO PHQ QUESTIONS 1-9: 0

## 2019-10-31 NOTE — PROGRESS NOTES
Subjective     Héctor Quispe is a 69 year old male who presents to clinic today for the following health issues:    History of Present Illness        Diabetes:   He presents for follow up of diabetes.  He is checking home blood glucose a few times a month. He checks blood glucose before meals.  Blood glucose is sometimes over 200 and never under 70. He is aware of hypoglycemia symptoms including shakiness. He has no concerns regarding his diabetes at this time.  He is not experiencing numbness or burning in feet, excessive thirst, blurry vision, weight changes or redness, sores or blisters on feet. The patient has had a diabetic eye exam in the last 12 months. Eye exam performed on September 26 2019.    Diabetes Management Resources     Lab Results   Component Value Date    A1C 7.5 05/06/2019    A1C 7.2 11/13/2018     Hyperlipidemia Follow-Up      Are you having any of the following symptoms? (Select all that apply)  No complaints of shortness of breath, chest pain or pressure.  No increased sweating or nausea with activity.  No left-sided neck or arm pain.  No complaints of pain in calves when walking 1-2 blocks.    Are you regularly taking any medication or supplement to lower your cholesterol?   Yes, tolerating    Are you having muscle aches or other side effects that you think could be caused by your cholesterol lowering medication?  No    Lab Results   Component Value Date    LDL 56 05/06/2019    LDL 27 11/13/2018     Hypertension Follow-up      Do you check your blood pressure regularly outside of the clinic? No     Are you following a low salt diet? Yes    Are your blood pressures ever more than 140 on the top number (systolic) OR more   than 90 on the bottom number (diastolic), for example 140/90? unk    Vascular Disease Follow-up      Are you having any of the following symptoms? (Select all that apply) No complaints of shortness of breath, chest pain or pressure.  No increased sweating or nausea with  activity.  No left-sided neck or arm pain.  No complaints of pain in calves when walking 1-2 blocks.    How often do you take nitroglycerin? Never    Do you take an aspirin every day? Yes      Patient Active Problem List   Diagnosis     Prostate cancer (H):[place]      Colon polyps     Chronic right shoulder pain- recurrent since      Cervical spinal stenosis     Type 2 diabetes mellitus with hyperglycemia, without long-term current use of insulin (H)     Essential hypertension with goal blood pressure less than 140/90     Hyperlipidemia with target LDL less than 70     Coronary artery disease involving native coronary artery of native heart without angina pectoris     Past Surgical History:   Procedure Laterality Date     BRACHYTHERAPY RADIOELEMENTS      brachytherapy for prostate ca     CYSTOSCOPY       ENT SURGERY       STENT, CORONARY, LUIS M      h/o coronary stent x 2       Social History     Tobacco Use     Smoking status: Former Smoker     Types: Cigarettes     Last attempt to quit: 2002     Years since quittin.2     Smokeless tobacco: Never Used   Substance Use Topics     Alcohol use: Yes     Alcohol/week: 3.0 standard drinks     Types: 3 Cans of beer per week     Family History   Problem Relation Age of Onset     Alzheimer Disease Mother      Cancer Father      Diabetes No family hx of      Coronary Artery Disease No family hx of      Hypertension No family hx of      Hyperlipidemia No family hx of      Cerebrovascular Disease No family hx of      Breast Cancer No family hx of      Colon Cancer No family hx of      Prostate Cancer No family hx of      Other Cancer No family hx of      Depression No family hx of      Anxiety Disorder No family hx of      Mental Illness No family hx of      Substance Abuse No family hx of      Anesthesia Reaction No family hx of      Asthma No family hx of      Osteoporosis No family hx of      Genetic Disorder No family hx of      Thyroid Disease No family hx  of      Obesity No family hx of      Unknown/Adopted No family hx of          Current Outpatient Medications   Medication Sig Dispense Refill     aspirin 81 MG tablet Take 1 tablet (81 mg) by mouth daily 90 tablet 11     atorvastatin (LIPITOR) 20 MG tablet Take 1 tablet (20 mg) by mouth daily 90 tablet 3     blood glucose (NO BRAND SPECIFIED) test strip Use to test blood sugar 1 times daily or as directed. 100 each 11     blood glucose calibration (NO BRAND SPECIFIED) solution Use to calibrate blood glucose monitor as needed as directed. 1 each 11     blood glucose monitoring (NO BRAND SPECIFIED) meter device kit Use to test blood sugar 1 times daily or as directed. 1 kit 0     docusate sodium (COLACE) 100 MG capsule Take 100 mg by mouth daily as needed for constipation       empagliflozin (JARDIANCE) 25 MG TABS tablet Take 1 tablet (25 mg) by mouth daily 90 tablet 1     lisinopril (PRINIVIL/ZESTRIL) 10 MG tablet Take 1 tablet (10 mg) by mouth daily 90 tablet 3     metFORMIN (GLUCOPHAGE) 1000 MG tablet Take 1 tablet (1,000 mg) by mouth 2 times daily (with meals) 180 tablet 1     metoprolol succinate ER (TOPROL-XL) 25 MG 24 hr tablet Take 0.5 tablets (12.5 mg) by mouth daily 45 tablet 3     multivitamin, therapeutic (THERA-VIT) TABS tablet Take 1 tablet by mouth daily       nitroglycerin (NITROSTAT) 0.4 MG SL tablet Place 1 tablet (0.4 mg) under the tongue every 5 minutes as needed for chest pain 30 tablet 6     omeprazole 20 MG tablet Take 20 mg by mouth daily.       pioglitazone (ACTOS) 15 MG tablet TAKE 1 TABLET EVERY DAY 90 tablet 1     tamsulosin (FLOMAX) 0.4 MG capsule Take 1 capsule (0.4 mg) by mouth daily 90 capsule 3     thin (NO BRAND SPECIFIED) lancets Use to test blood sugar 1 times daily or as directed. 100 each 11       ROS: The following systems have been completely reviewed and are negative except as noted in the HPI: CONSTITUTIONAL, CARDIOVASCULAR, PULMONARY    OBJECTIVE:                                                     /70 (Cuff Size: Adult Regular)   Pulse 75   Temp 97.6  F (36.4  C) (Oral)   Wt 103.9 kg (229 lb)   SpO2 96%   BMI 33.82 kg/m   Body mass index is 33.82 kg/m .  GENERAL:  alert,  no distress  NECK: no tenderness, no adenopathy  RESP: lungs clear to auscultation - no rales, no rhonchi, no wheezes  CV: regular rates and rhythm, normal S1 S2, no S3 or S4 and no murmur, no click or rub   MS: extremities- no edema     ASSESSMENT/PLAN:                                                        ICD-10-CM    1. Type 2 diabetes mellitus with hyperglycemia, without long-term current use of insulin (H) E11.65 Hemoglobin A1c     Albumin Random Urine Quantitative with Creat Ratio     metFORMIN (GLUCOPHAGE) 1000 MG tablet     empagliflozin (JARDIANCE) 25 MG TABS tablet     pioglitazone (ACTOS) 15 MG tablet     Current regimen: Metformin 1000 mg twice daily, Jardiance 25 mg daily, pioglitazone 15 mg daily.  Tolerating Jardiance/filling through a [place] pharmacy.  Due for repeat lab work today.     2. Coronary artery disease involving native coronary artery of native heart without angina pectoris I25.10 metoprolol succinate ER (TOPROL-XL) 25 MG 24 hr tablet      Stable coronary disease, continue current regimen.     3. Essential hypertension with goal blood pressure less than 140/90 I10 lisinopril (PRINIVIL/ZESTRIL) 10 MG tablet       Well-controlled on current regimen.     4. Hyperlipidemia with target LDL less than 70 E78.5 atorvastatin (LIPITOR) 20 MG tablet     Lipid panel reflex to direct LDL Fasting       Well-controlled on current regimen.     5. Prostate cancer (H) C61 tamsulosin (FLOMAX) 0.4 MG capsule     PSA, tumor marker       History of prostate cancer previously treated with brachytherapy, due for follow-up PSA.  Some residual BPH symptoms-continue Flomax.        Jesus Cherry MD  [facility]

## 2019-11-01 LAB
CHOLEST SERPL-MCNC: 158 MG/DL
CREAT UR-MCNC: 44 MG/DL
HDLC SERPL-MCNC: 61 MG/DL
LDLC SERPL CALC-MCNC: 59 MG/DL
MICROALBUMIN UR-MCNC: 104 MG/L
MICROALBUMIN/CREAT UR: 239.08 MG/G CR (ref 0–17)
NONHDLC SERPL-MCNC: 92 MG/DL
PSA SERPL-MCNC: <0.01 UG/L (ref 0–4)
TRIGL SERPL-MCNC: 167 MG/DL

## 2019-11-04 ENCOUNTER — TELEPHONE (OUTPATIENT)
Dept: PEDIATRICS | Facility: CLINIC | Age: 69
End: 2019-11-04

## 2019-11-04 DIAGNOSIS — E11.65 TYPE 2 DIABETES MELLITUS WITH HYPERGLYCEMIA, WITHOUT LONG-TERM CURRENT USE OF INSULIN (H): Primary | ICD-10-CM

## 2019-11-04 RX ORDER — GLIPIZIDE 5 MG/1
5 TABLET, FILM COATED, EXTENDED RELEASE ORAL DAILY
Qty: 30 TABLET | Refills: 2 | Status: SHIPPED | OUTPATIENT
Start: 2019-11-04 | End: 2019-11-18

## 2019-11-04 NOTE — TELEPHONE ENCOUNTER
Recent results discussed with pt:     Lab Results   Component Value Date    A1C 8.4 10/31/2019    A1C 7.5 05/06/2019      poorly controlled diabetes.  Current rx: metformin 1000mg BID  actos 15mg daily  jardiance 25    rec adding glp1Ag vs insulin.  Pt declines -victoza was too expensive.  Pt resistant to injections, would like to resume glipizide as a trial    Plan:  Continue metformin, actos, jardiance  Add glipizide 5mg QAM and pt will call or email preBF and pre dinner BG readings in 2 weeks.   reviewd hypoglycemia sx, pt to call if any readings trend below 70

## 2019-11-17 ENCOUNTER — MYC MEDICAL ADVICE (OUTPATIENT)
Dept: PEDIATRICS | Facility: CLINIC | Age: 69
End: 2019-11-17

## 2019-11-17 DIAGNOSIS — E11.65 TYPE 2 DIABETES MELLITUS WITH HYPERGLYCEMIA, WITHOUT LONG-TERM CURRENT USE OF INSULIN (H): ICD-10-CM

## 2019-11-18 RX ORDER — GLIPIZIDE 5 MG/1
5 TABLET, FILM COATED, EXTENDED RELEASE ORAL DAILY
Qty: 90 TABLET | Refills: 11 | Status: SHIPPED | OUTPATIENT
Start: 2019-11-18 | End: 2020-12-30

## 2019-11-18 NOTE — TELEPHONE ENCOUNTER
Patient updating on blood sugars since starting Glipizide 5 every day.   Patient would like 90 day supply to [facility] if continuing.     Also takes:  Jardiance 25 mg every day  Metformin 1,000 bid  Actos 15 mg every day

## 2020-03-15 ENCOUNTER — HEALTH MAINTENANCE LETTER (OUTPATIENT)
Age: 70
End: 2020-03-15

## 2020-03-31 ENCOUNTER — VIRTUAL VISIT (OUTPATIENT)
Dept: PEDIATRICS | Facility: CLINIC | Age: 70
End: 2020-03-31
Payer: COMMERCIAL

## 2020-03-31 DIAGNOSIS — E78.5 HYPERLIPIDEMIA WITH TARGET LDL LESS THAN 70: ICD-10-CM

## 2020-03-31 DIAGNOSIS — I10 ESSENTIAL HYPERTENSION WITH GOAL BLOOD PRESSURE LESS THAN 140/90: ICD-10-CM

## 2020-03-31 DIAGNOSIS — E11.65 TYPE 2 DIABETES MELLITUS WITH HYPERGLYCEMIA, WITHOUT LONG-TERM CURRENT USE OF INSULIN (H): Primary | ICD-10-CM

## 2020-03-31 DIAGNOSIS — I25.10 CORONARY ARTERY DISEASE INVOLVING NATIVE CORONARY ARTERY OF NATIVE HEART WITHOUT ANGINA PECTORIS: ICD-10-CM

## 2020-03-31 PROCEDURE — 99214 OFFICE O/P EST MOD 30 MIN: CPT | Performed by: INTERNAL MEDICINE

## 2020-03-31 RX ORDER — PIOGLITAZONEHYDROCHLORIDE 15 MG/1
TABLET ORAL
Qty: 90 TABLET | Refills: 1 | Status: SHIPPED | OUTPATIENT
Start: 2020-03-31 | End: 2020-09-28

## 2020-03-31 NOTE — PROGRESS NOTES
"Subjective     Héctor Quispe is a 69 year old male who is being evaluated via a billable telephone visit.      The patient has been notified of following:     \"This telephone visit will be conducted via a call between you and your physician/provider. We have found that certain health care needs can be provided without the need for a physical exam.  This service lets us provide the care you need with a short phone conversation.  If a prescription is necessary we can send it directly to your pharmacy.  If lab work is needed we can place an order for that and you can then stop by our lab to have the test done at a later time.    If during the course of the call the physician/provider feels a telephone visit is not appropriate, you will not be charged for this service.\"     Patient has given verbal consent for Telephone visit?  Yes    Héctor Quispe complains of   Chief Complaint   Patient presents with     Diabetes       ALLERGIES  Patient has no known allergies.    Diabetes Follow-up      How often are you checking your blood sugar?   Am-140's   Evenings 140's    What concerns do you have today about your diabetes? None, improved with addition of glipizide     Do you have any of these symptoms? (Select all that apply)  denies    Hyperlipidemia Follow-Up      Are you regularly taking any medication or supplement to lower your cholesterol?   Yes- lipitor    Are you having muscle aches or other side effects that you think could be caused by your cholesterol lowering medication?  No    Hypertension Follow-up      Do you check your blood pressure regularly outside of the clinic? no     Are you following a low salt diet? Yes    Are your blood pressures ever more than 140 on the top number (systolic) OR more   than 90 on the bottom number (diastolic), for example 140/90? No    BP Readings from Last 2 Encounters:   10/31/19 122/70   05/06/19 144/64     Hemoglobin A1C (%)   Date Value   10/31/2019 8.4 (H)   05/06/2019 7.5 " (H)     LDL Cholesterol Calculated (mg/dL)   Date Value   10/31/2019 59   2019 56       Vascular Disease Follow-up      How often do you take nitroglycerin? Never    Do you take an aspirin every day? Yes       Patient Active Problem List   Diagnosis     Prostate cancer (H):[place]      Colon polyps     Chronic right shoulder pain- recurrent since      Cervical spinal stenosis     Type 2 diabetes mellitus with hyperglycemia, without long-term current use of insulin (H)     Essential hypertension with goal blood pressure less than 140/90     Hyperlipidemia with target LDL less than 70     Coronary artery disease involving native coronary artery of native heart without angina pectoris     Past Surgical History:   Procedure Laterality Date     BRACHYTHERAPY RADIOELEMENTS      brachytherapy for prostate ca     CYSTOSCOPY       ENT SURGERY       STENT, CORONARY, LUIS M      h/o coronary stent x 2       Social History     Tobacco Use     Smoking status: Former Smoker     Types: Cigarettes     Last attempt to quit: 2002     Years since quittin.6     Smokeless tobacco: Never Used   Substance Use Topics     Alcohol use: Yes     Alcohol/week: 3.0 standard drinks     Types: 3 Cans of beer per week     Family History   Problem Relation Age of Onset     Alzheimer Disease Mother      Cancer Father      Diabetes No family hx of      Coronary Artery Disease No family hx of      Hypertension No family hx of      Hyperlipidemia No family hx of      Cerebrovascular Disease No family hx of      Breast Cancer No family hx of      Colon Cancer No family hx of      Prostate Cancer No family hx of      Other Cancer No family hx of      Depression No family hx of      Anxiety Disorder No family hx of      Mental Illness No family hx of      Substance Abuse No family hx of      Anesthesia Reaction No family hx of      Asthma No family hx of      Osteoporosis No family hx of      Genetic Disorder No family hx of       Thyroid Disease No family hx of      Obesity No family hx of      Unknown/Adopted No family hx of          Current Outpatient Medications   Medication Sig Dispense Refill     aspirin 81 MG tablet Take 1 tablet (81 mg) by mouth daily 90 tablet 11     atorvastatin (LIPITOR) 20 MG tablet Take 1 tablet (20 mg) by mouth daily 90 tablet 3     blood glucose (NO BRAND SPECIFIED) test strip Use to test blood sugar 1 times daily or as directed. 100 each 11     blood glucose calibration (NO BRAND SPECIFIED) solution Use to calibrate blood glucose monitor as needed as directed. 1 each 11     blood glucose monitoring (NO BRAND SPECIFIED) meter device kit Use to test blood sugar 1 times daily or as directed. 1 kit 0     docusate sodium (COLACE) 100 MG capsule Take 100 mg by mouth daily as needed for constipation       empagliflozin (JARDIANCE) 25 MG TABS tablet Take 1 tablet (25 mg) by mouth daily 90 tablet 1     glipiZIDE (GLUCOTROL XL) 5 MG 24 hr tablet Take 1 tablet (5 mg) by mouth daily 90 tablet 11     lisinopril (PRINIVIL/ZESTRIL) 10 MG tablet Take 1 tablet (10 mg) by mouth daily 90 tablet 3     metFORMIN (GLUCOPHAGE) 1000 MG tablet Take 1 tablet (1,000 mg) by mouth 2 times daily (with meals) 180 tablet 1     metoprolol succinate ER (TOPROL-XL) 25 MG 24 hr tablet Take 0.5 tablets (12.5 mg) by mouth daily 45 tablet 3     multivitamin, therapeutic (THERA-VIT) TABS tablet Take 1 tablet by mouth daily       nitroglycerin (NITROSTAT) 0.4 MG SL tablet Place 1 tablet (0.4 mg) under the tongue every 5 minutes as needed for chest pain 30 tablet 6     omeprazole 20 MG tablet Take 20 mg by mouth daily.       pioglitazone (ACTOS) 15 MG tablet TAKE 1 TABLET EVERY DAY 90 tablet 1     tamsulosin (FLOMAX) 0.4 MG capsule Take 1 capsule (0.4 mg) by mouth daily 90 capsule 3     thin (NO BRAND SPECIFIED) lancets Use to test blood sugar 1 times daily or as directed. 100 each 11        Reviewed and updated as needed this visit by Provider          Review of Systems   ROS COMP: Constitutional, HEENT, cardiovascular, pulmonary, gi and gu systems are negative, except as otherwise noted.       Objective   Reported vitals:  There were no vitals taken for this visit.   Exam deferred            Assessment/Plan:      ICD-10-CM    1. Type 2 diabetes mellitus with hyperglycemia, without long-term current use of insulin (H)  E11.65 Hemoglobin A1c     Albumin Random Urine Quantitative with Creat Ratio     pioglitazone (ACTOS) 15 MG tablet     metFORMIN (GLUCOPHAGE) 1000 MG tablet     empagliflozin (JARDIANCE) 25 MG TABS tablet        Diabetes follow-up.  A1c not at goal morning and evening sugars have improved with the addition of glipizide since last visit.  Reviewed dietary guidelines and discussed increasing activity.  Patient will return for lab only in the next 3 months and office visit follow-up in about 6 months     2. Coronary artery disease involving native coronary artery of native heart without angina pectoris  I25.10 Stable, continue current rx         3. Essential hypertension with goal blood pressure less than 140/90  I10  continue current regimen, recheck at next office follow-up.     4. Hyperlipidemia with target LDL less than 70  E78.5 Comprehensive metabolic panel     Lipid panel reflex to direct LDL Fasting     Adequate control.  Continue current regimen without changes.        Phone call duration:  15 minutes    Jesus Cherry MD, MD

## 2020-04-15 ENCOUNTER — MYC MEDICAL ADVICE (OUTPATIENT)
Dept: PEDIATRICS | Facility: CLINIC | Age: 70
End: 2020-04-15

## 2020-04-15 DIAGNOSIS — E11.65 TYPE 2 DIABETES MELLITUS WITH HYPERGLYCEMIA, WITHOUT LONG-TERM CURRENT USE OF INSULIN (H): ICD-10-CM

## 2020-08-24 DIAGNOSIS — E78.5 HYPERLIPIDEMIA WITH TARGET LDL LESS THAN 70: ICD-10-CM

## 2020-08-24 DIAGNOSIS — E11.65 TYPE 2 DIABETES MELLITUS WITH HYPERGLYCEMIA, WITHOUT LONG-TERM CURRENT USE OF INSULIN (H): ICD-10-CM

## 2020-08-24 LAB
ALBUMIN SERPL-MCNC: 3.8 G/DL (ref 3.4–5)
ALP SERPL-CCNC: 69 U/L (ref 40–150)
ALT SERPL W P-5'-P-CCNC: 30 U/L (ref 0–70)
ANION GAP SERPL CALCULATED.3IONS-SCNC: 7 MMOL/L (ref 3–14)
AST SERPL W P-5'-P-CCNC: 15 U/L (ref 0–45)
BILIRUB SERPL-MCNC: 0.4 MG/DL (ref 0.2–1.3)
BUN SERPL-MCNC: 24 MG/DL (ref 7–30)
CALCIUM SERPL-MCNC: 8.5 MG/DL (ref 8.5–10.1)
CHLORIDE SERPL-SCNC: 105 MMOL/L (ref 94–109)
CHOLEST SERPL-MCNC: 138 MG/DL
CO2 SERPL-SCNC: 26 MMOL/L (ref 20–32)
CREAT SERPL-MCNC: 0.95 MG/DL (ref 0.66–1.25)
GFR SERPL CREATININE-BSD FRML MDRD: 79 ML/MIN/{1.73_M2}
GLUCOSE SERPL-MCNC: 146 MG/DL (ref 70–99)
HBA1C MFR BLD: 7.3 % (ref 0–5.6)
HDLC SERPL-MCNC: 57 MG/DL
LDLC SERPL CALC-MCNC: 43 MG/DL
NONHDLC SERPL-MCNC: 81 MG/DL
POTASSIUM SERPL-SCNC: 4.1 MMOL/L (ref 3.4–5.3)
PROT SERPL-MCNC: 7.4 G/DL (ref 6.8–8.8)
SODIUM SERPL-SCNC: 138 MMOL/L (ref 133–144)
TRIGL SERPL-MCNC: 189 MG/DL

## 2020-08-24 PROCEDURE — 36415 COLL VENOUS BLD VENIPUNCTURE: CPT | Performed by: INTERNAL MEDICINE

## 2020-08-24 PROCEDURE — 82043 UR ALBUMIN QUANTITATIVE: CPT | Performed by: INTERNAL MEDICINE

## 2020-08-24 PROCEDURE — 80061 LIPID PANEL: CPT | Performed by: INTERNAL MEDICINE

## 2020-08-24 PROCEDURE — 83036 HEMOGLOBIN GLYCOSYLATED A1C: CPT | Performed by: INTERNAL MEDICINE

## 2020-08-24 PROCEDURE — 80053 COMPREHEN METABOLIC PANEL: CPT | Performed by: INTERNAL MEDICINE

## 2020-08-25 LAB
CREAT UR-MCNC: 81 MG/DL
MICROALBUMIN UR-MCNC: 109 MG/L
MICROALBUMIN/CREAT UR: 135.07 MG/G CR (ref 0–17)

## 2020-09-28 ENCOUNTER — OFFICE VISIT (OUTPATIENT)
Dept: PEDIATRICS | Facility: CLINIC | Age: 70
End: 2020-09-28
Payer: COMMERCIAL

## 2020-09-28 VITALS
BODY MASS INDEX: 35.25 KG/M2 | DIASTOLIC BLOOD PRESSURE: 60 MMHG | HEIGHT: 69 IN | HEART RATE: 75 BPM | SYSTOLIC BLOOD PRESSURE: 108 MMHG | TEMPERATURE: 97.3 F | OXYGEN SATURATION: 97 % | RESPIRATION RATE: 16 BRPM | WEIGHT: 238 LBS

## 2020-09-28 DIAGNOSIS — E78.5 HYPERLIPIDEMIA WITH TARGET LDL LESS THAN 70: ICD-10-CM

## 2020-09-28 DIAGNOSIS — Z23 NEED FOR PROPHYLACTIC VACCINATION AND INOCULATION AGAINST INFLUENZA: ICD-10-CM

## 2020-09-28 DIAGNOSIS — K57.30 DIVERTICULOSIS OF LARGE INTESTINE WITHOUT HEMORRHAGE: ICD-10-CM

## 2020-09-28 DIAGNOSIS — C61 PROSTATE CANCER (H): ICD-10-CM

## 2020-09-28 DIAGNOSIS — I10 ESSENTIAL HYPERTENSION WITH GOAL BLOOD PRESSURE LESS THAN 140/90: ICD-10-CM

## 2020-09-28 DIAGNOSIS — Z00.00 ENCOUNTER FOR MEDICARE ANNUAL WELLNESS EXAM: Primary | ICD-10-CM

## 2020-09-28 DIAGNOSIS — I25.10 CORONARY ARTERY DISEASE INVOLVING NATIVE CORONARY ARTERY OF NATIVE HEART WITHOUT ANGINA PECTORIS: ICD-10-CM

## 2020-09-28 DIAGNOSIS — E11.65 TYPE 2 DIABETES MELLITUS WITH HYPERGLYCEMIA, WITHOUT LONG-TERM CURRENT USE OF INSULIN (H): ICD-10-CM

## 2020-09-28 PROCEDURE — 99207 C FOOT EXAM  NO CHARGE: CPT | Mod: 25 | Performed by: INTERNAL MEDICINE

## 2020-09-28 PROCEDURE — 99397 PER PM REEVAL EST PAT 65+ YR: CPT | Mod: 25 | Performed by: INTERNAL MEDICINE

## 2020-09-28 PROCEDURE — 90471 IMMUNIZATION ADMIN: CPT | Performed by: INTERNAL MEDICINE

## 2020-09-28 PROCEDURE — 99213 OFFICE O/P EST LOW 20 MIN: CPT | Mod: 25 | Performed by: INTERNAL MEDICINE

## 2020-09-28 PROCEDURE — 90662 IIV NO PRSV INCREASED AG IM: CPT | Performed by: INTERNAL MEDICINE

## 2020-09-28 RX ORDER — PIOGLITAZONEHYDROCHLORIDE 15 MG/1
TABLET ORAL
Qty: 90 TABLET | Refills: 1 | Status: SHIPPED | OUTPATIENT
Start: 2020-09-28 | End: 2021-03-31

## 2020-09-28 RX ORDER — ATORVASTATIN CALCIUM 20 MG/1
20 TABLET, FILM COATED ORAL DAILY
Qty: 90 TABLET | Refills: 3 | Status: SHIPPED | OUTPATIENT
Start: 2020-09-28 | End: 2021-07-19

## 2020-09-28 RX ORDER — TAMSULOSIN HYDROCHLORIDE 0.4 MG/1
0.4 CAPSULE ORAL DAILY
Qty: 90 CAPSULE | Refills: 3 | Status: SHIPPED | OUTPATIENT
Start: 2020-09-28 | End: 2021-07-19

## 2020-09-28 RX ORDER — LISINOPRIL 10 MG/1
10 TABLET ORAL DAILY
Qty: 90 TABLET | Refills: 3 | Status: SHIPPED | OUTPATIENT
Start: 2020-09-28 | End: 2021-07-19

## 2020-09-28 RX ORDER — METOPROLOL SUCCINATE 25 MG/1
12.5 TABLET, EXTENDED RELEASE ORAL DAILY
Qty: 45 TABLET | Refills: 3 | Status: SHIPPED | OUTPATIENT
Start: 2020-09-28 | End: 2020-12-07

## 2020-09-28 ASSESSMENT — MIFFLIN-ST. JEOR: SCORE: 1819.94

## 2020-09-28 ASSESSMENT — ACTIVITIES OF DAILY LIVING (ADL): CURRENT_FUNCTION: NO ASSISTANCE NEEDED

## 2020-09-28 NOTE — PROGRESS NOTES
SUBJECTIVE:   Héctor Quispe is a 70 year old male who presents for Preventive Visit.      Patient has been advised of split billing requirements and indicates understanding: Yes   Are you in the first 12 months of your Medicare coverage?  No    Healthy Habits:     In general, how would you rate your overall health?  Good    Frequency of exercise:  None    Duration of exercise:  Less than 15 minutes    Taking medications regularly:  Yes    Barriers to taking medications:  None    Medication side effects:  None    Ability to successfully perform activities of daily living:  No assistance needed    Home Safety:  No safety concerns identified    Hearing Impairment:  No hearing concerns    In the past 6 months, have you been bothered by leaking of urine?  No    In general, how would you rate your overall mental or emotional health?  Very good      PHQ-2 Total Score: 0    Additional concerns today:  No    Do you feel safe in your environment? Yes    Have you ever done Advance Care Planning? (For example, a Health Directive, POLST, or a discussion with a medical provider or your loved ones about your wishes): No, advance care planning information given to patient to review.  Patient plans to discuss their wishes with loved ones or provider.        Fall risk  Fallen 2 or more times in the past year?: No  Any fall with injury in the past year?: No    Cognitive Screening   1) Repeat 3 items (Leader, Season, Table)    2) Clock draw: NORMAL  3) 3 item recall: Recalls 3 objects  Results: NORMAL clock, 1-2 items recalled: COGNITIVE IMPAIRMENT LESS LIKELY    Mini-CogTM Copyright MARCIO Roman. Licensed by the author for use in [facility]; reprinted with permission (francesco@.[email or web address]). All rights reserved.      Do you have sleep apnea, excessive snoring or daytime drowsiness?: no    Reviewed and updated as needed this visit by clinical staff  Tobacco  Allergies  Med Hx  Surg Hx  Fam Hx  Soc Hx        Reviewed and updated  as needed this visit by Provider        Social History     Tobacco Use     Smoking status: Former Smoker     Types: Cigarettes     Last attempt to quit: 2002     Years since quittin.1     Smokeless tobacco: Never Used   Substance Use Topics     Alcohol use: Yes     Alcohol/week: 3.0 standard drinks     Types: 3 Cans of beer per week     If you drink alcohol do you typically have >3 drinks per day or >7 drinks per week? No    Alcohol Use 2015   Prescreen: >3 drinks/day or >7 drinks/week? The patient does not drink >3 drinks per day nor >7 drinks per week.       Diabetes Follow-up      How often are you checking your blood sugar? Not at all    What concerns do you have today about your diabetes? None     Do you have any of these symptoms? (Select all that apply)  No numbness or tingling in feet.  No redness, sores or blisters on feet.  No complaints of excessive thirst.  No reports of blurry vision.  No significant changes to weight.    Have you had a diabetic eye exam in the last 12 months? No    BP Readings from Last 2 Encounters:   20 108/60   10/31/19 122/70     Hemoglobin A1C (%)   Date Value   2020 7.3 (H)   10/31/2019 8.4 (H)     LDL Cholesterol Calculated (mg/dL)   Date Value   2020 43   10/31/2019 59       Current providers sharing in care for this patient include:   Patient Care Team:  Jesus Cherry MD as PCP - General (Internal Medicine)  Jesus Cherry MD as Assigned PCP    The following health maintenance items are reviewed in Epic and correct as of today:  Health Maintenance   Topic Date Due     ANNUAL REVIEW OF HM ORDERS  1950     ADVANCE CARE PLANNING  1950     HEPATITIS B IMMUNIZATION (1 of 3 - Risk 3-dose series) 1967     ZOSTER IMMUNIZATION (2 of 3) 2013     MEDICARE ANNUAL WELLNESS VISIT  2016     DIABETIC FOOT EXAM  2019     FALL RISK ASSESSMENT  2019     PHQ-2  2020     INFLUENZA VACCINE (1) 2020      EYE EXAM  09/25/2020     A1C  02/24/2021     BMP  08/24/2021     LIPID  08/24/2021     MICROALBUMIN  08/24/2021     COLORECTAL CANCER SCREENING  02/20/2022     DTAP/TDAP/TD IMMUNIZATION (3 - Td) 12/16/2023     HEPATITIS C SCREENING  Completed     PNEUMOCOCCAL IMMUNIZATION 65+ LOW/MEDIUM RISK  Completed     AORTIC ANEURYSM SCREENING (SYSTEM ASSIGNED)  Completed     IPV IMMUNIZATION  Aged Out     MENINGITIS IMMUNIZATION  Aged Out     Patient Active Problem List   Diagnosis     Prostate cancer (H):[place] 2010     Colon polyps     Chronic right shoulder pain- recurrent since 2013     Cervical spinal stenosis     Type 2 diabetes mellitus with hyperglycemia, without long-term current use of insulin (H)     Essential hypertension with goal blood pressure less than 140/90     Hyperlipidemia with target LDL less than 70     Coronary artery disease involving native coronary artery of native heart without angina pectoris     Diverticulosis of large intestine     Past Medical History:   Diagnosis Date     Coronary artery disease involving native coronary artery of native heart without angina pectoris 1/6/2016     Essential hypertension 1/6/2016     Type 2 diabetes mellitus with diabetic nephropathy (H) 1/6/2016       Past Surgical History:   Procedure Laterality Date     BRACHYTHERAPY RADIOELEMENTS  2011    brachytherapy for prostate ca     CYSTOSCOPY       ENT SURGERY       STENT, CORONARY, LUIS M      h/o coronary stent x 2       Family History   Problem Relation Age of Onset     Alzheimer Disease Mother      Cancer Father      Diabetes No family hx of      Coronary Artery Disease No family hx of      Hypertension No family hx of      Hyperlipidemia No family hx of      Cerebrovascular Disease No family hx of      Breast Cancer No family hx of      Colon Cancer No family hx of      Prostate Cancer No family hx of      Other Cancer No family hx of      Depression No family hx of      Anxiety Disorder No family hx of      Mental  Illness No family hx of      Substance Abuse No family hx of      Anesthesia Reaction No family hx of      Asthma No family hx of      Osteoporosis No family hx of      Genetic Disorder No family hx of      Thyroid Disease No family hx of      Obesity No family hx of      Unknown/Adopted No family hx of        ALLERGIES   No Known Allergies    Current Outpatient Medications   Medication Sig Dispense Refill     aspirin 81 MG tablet Take 1 tablet (81 mg) by mouth daily 90 tablet 11     atorvastatin (LIPITOR) 20 MG tablet Take 1 tablet (20 mg) by mouth daily 90 tablet 3     blood glucose (NO BRAND SPECIFIED) test strip Use to test blood sugar 1 times daily or as directed. 100 each 11     blood glucose calibration (NO BRAND SPECIFIED) solution Use to calibrate blood glucose monitor as needed as directed. 1 each 11     blood glucose monitoring (NO BRAND SPECIFIED) meter device kit Use to test blood sugar 1 times daily or as directed. 1 kit 0     docusate sodium (COLACE) 100 MG capsule Take 100 mg by mouth daily as needed for constipation       empagliflozin (JARDIANCE) 25 MG TABS tablet Take 1 tablet (25 mg) by mouth daily 90 tablet 3     glipiZIDE (GLUCOTROL XL) 5 MG 24 hr tablet Take 1 tablet (5 mg) by mouth daily 90 tablet 11     lisinopril (ZESTRIL) 10 MG tablet Take 1 tablet (10 mg) by mouth daily 90 tablet 3     metFORMIN (GLUCOPHAGE) 1000 MG tablet Take 1 tablet (1,000 mg) by mouth 2 times daily (with meals) 180 tablet 1     metoprolol succinate ER (TOPROL-XL) 25 MG 24 hr tablet Take 0.5 tablets (12.5 mg) by mouth daily 45 tablet 3     multivitamin, therapeutic (THERA-VIT) TABS tablet Take 1 tablet by mouth daily       nitroglycerin (NITROSTAT) 0.4 MG SL tablet Place 1 tablet (0.4 mg) under the tongue every 5 minutes as needed for chest pain 30 tablet 6     omeprazole 20 MG tablet Take 20 mg by mouth daily.       pioglitazone (ACTOS) 15 MG tablet TAKE 1 TABLET EVERY DAY 90 tablet 1     tamsulosin (FLOMAX) 0.4 MG  "capsule Take 1 capsule (0.4 mg) by mouth daily 90 capsule 3     thin (NO BRAND SPECIFIED) lancets Use to test blood sugar 1 times daily or as directed. 100 each 11        Review of Systems  Constitutional, HEENT, cardiovascular, pulmonary, GI, , musculoskeletal, neuro, skin, endocrine and psych systems are negative, except as otherwise noted.    OBJECTIVE:   /60 (Cuff Size: Adult Large)   Pulse 75   Temp 97.3  F (36.3  C) (Tympanic)   Resp 16   Ht 1.753 m (5' 9\")   Wt 108 kg (238 lb)   SpO2 97%   BMI 35.15 kg/m   Estimated body mass index is 35.15 kg/m  as calculated from the following:    Height as of this encounter: 1.753 m (5' 9\").    Weight as of this encounter: 108 kg (238 lb).  Physical Exam  GENERAL: healthy, alert and no distress  EYES: Eyes grossly normal to inspection, PERRL and conjunctivae and sclerae normal  HENT: ear canals and TM's normal, nose and mouth without ulcers or lesions  NECK: no adenopathy, no asymmetry, masses, or scars and thyroid normal to palpation  RESP: lungs clear to auscultation - no rales, rhonchi or wheezes  CV: regular rate and rhythm, normal S1 S2, no S3 or S4, no murmur, click or rub, no peripheral edema and peripheral pulses strong  ABDOMEN: soft, nontender, no hepatosplenomegaly, no masses and bowel sounds normal  MS: no gross musculoskeletal defects noted, no edema  SKIN: no suspicious lesions or rashes  NEURO: Normal strength and tone, mentation intact and speech normal  PSYCH: mentation appears normal, affect normal/bright  Diabetic foot exam: without skin lesions.  normal sensation      ASSESSMENT / PLAN:       ICD-10-CM    1. Encounter for Medicare annual wellness exam  Z00.00    2. Type 2 diabetes mellitus with hyperglycemia, without long-term current use of insulin (H)  E11.65 FOOT EXAM     metFORMIN (GLUCOPHAGE) 1000 MG tablet     pioglitazone (ACTOS) 15 MG tablet     empagliflozin (JARDIANCE) 25 MG TABS tablet     Hemoglobin A1c        Lab Results " "  Component Value Date    A1C 7.3 08/24/2020    A1C 8.4 10/31/2019         Metformin 1000, actos 15, jardiance 25, glipizide 5      Continue current rx, rtc for diabetes visit with labwork 1/21      3. Coronary artery disease involving native coronary artery of native heart without angina pectoris  I25.10 metoprolol succinate ER (TOPROL-XL) 25 MG 24 hr tablet     Stable, continue current rx     4. Essential hypertension with goal blood pressure less than 140/90  I10 lisinopril (ZESTRIL) 10 MG tablet       Well controlled     5. Hyperlipidemia with target LDL less than 70  E78.5 atorvastatin (LIPITOR) 20 MG tablet     Well controlled     6. Prostate cancer (H)  C61 tamsulosin (FLOMAX) 0.4 MG capsule     PSA, tumor marker     Prior tx: brachytherapy and radtx  Due for PSA     7. Diverticulosis of large intestine without hemorrhage  K57.30 Incidental finding on recent colonoscopy, discussed     8. Need for prophylactic vaccination and inoculation against influenza  Z23 FLUZONE HIGH DOSE 65+  [01600]     Vaccine Administration, Initial [77701]       Patient has been advised of split billing requirements and indicates understanding: Yes  COUNSELING:  Reviewed preventive health counseling, as reflected in patient instructions       Regular exercise       Healthy diet/nutrition       Colon cancer screening    Estimated body mass index is 35.15 kg/m  as calculated from the following:    Height as of this encounter: 1.753 m (5' 9\").    Weight as of this encounter: 108 kg (238 lb).    Weight management plan: Discussed healthy diet and exercise guidelines    He reports that he quit smoking about 18 years ago. His smoking use included cigarettes. He has never used smokeless tobacco.      Appropriate preventive services were discussed with this patient, including applicable screening as appropriate for cardiovascular disease, diabetes, osteopenia/osteoporosis, and glaucoma.  As appropriate for age/gender, discussed screening for " colorectal cancer, prostate cancer, breast cancer, and cervical cancer. Checklist reviewing preventive services available has been given to the patient.    Reviewed patients plan of care and provided an AVS. The Basic Care Plan (routine screening as documented in Health Maintenance) for Héctor meets the Care Plan requirement. This Care Plan has been established and reviewed with the Patient.    Counseling Resources:  ATP IV Guidelines  Pooled Cohorts Equation Calculator  Breast Cancer Risk Calculator  Breast Cancer: Medication to Reduce Risk  FRAX Risk Assessment  ICSI Preventive Guidelines  Dietary Guidelines for Americans, 2010  "[employer]'s MyPlate  ASA Prophylaxis  Lung CA Screening    Jesus Cherry MD, MD  [facility]    Identified Health Risks:    He is at risk for lack of exercise and has been provided with information to increase physical activity for the benefit of his well-being.

## 2020-09-28 NOTE — PATIENT INSTRUCTIONS
Patient Education   Personalized Prevention Plan  You are due for the preventive services outlined below.  Your care team is available to assist you in scheduling these services.  If you have already completed any of these items, please share that information with your care team to update in your medical record.  Health Maintenance Due   Topic Date Due     ANNUAL REVIEW OF HM ORDERS  1950     Discuss Advance Care Planning  1950     Hepatitis B Vaccine (1 of 3 - Risk 3-dose series) 06/20/1967     Zoster (Shingles) Vaccine (2 of 3) 03/01/2013     Annual Wellness Visit  05/06/2016     Diabetic Foot Exam  11/26/2019     FALL RISK ASSESSMENT  11/26/2019     PHQ-2  01/01/2020     Flu Vaccine (1) 09/01/2020     Eye Exam  09/25/2020     Preventive Health Recommendations  See your health care provider every year to    Review health changes.     Discuss preventive care.      Review your medicines if your doctor has prescribed any.    Talk with your health care provider about whether you should have a test to screen for prostate cancer (PSA).    Every 3 years, have a diabetes test (fasting glucose). If you are at risk for diabetes, you should have this test more often.    Every 5 years, have a cholesterol test. Have this test more often if you are at risk for high cholesterol or heart disease.     Every 10 years, have a colonoscopy. Or, have a yearly FIT test (stool test). These exams will check for colon cancer.    Talk to with your health care provider about screening for Abdominal Aortic Aneurysm if you have a family history of AAA or have a history of smoking.    Shots:     Get a flu shot each year.     Get a tetanus shot every 10 years.     Talk to your doctor about your pneumonia vaccines. There are now two you should receive - Pneumovax (PPSV 23) and Prevnar (PCV 13).    Talk to your pharmacist about a shingles vaccine.     Talk to your doctor about the hepatitis B vaccine.    Nutrition:     Eat at least 5  servings of fruits and vegetables each day.     Eat whole-grain bread, whole-wheat pasta and brown rice instead of white grains and rice.     Get adequate Calcium and Vitamin D.     Lifestyle    Exercise for at least 150 minutes a week (30 minutes a day, 5 days a week). This will help you control your weight and prevent disease.     Limit alcohol to one drink per day.     No smoking.     Wear sunscreen to prevent skin cancer.     See your dentist every six months for an exam and cleaning.     See your eye doctor every 1 to 2 years to screen for conditions such as glaucoma, macular degeneration and cataracts.    Personalized Prevention Plan  You are due for the preventive services outlined below.  Your care team is available to assist you in scheduling these services.  If you have already completed any of these items, please share that information with your care team to update in your medical record.  Health Maintenance   Topic Date Due     ANNUAL REVIEW OF HM ORDERS  1950     ADVANCE CARE PLANNING  1950     HEPATITIS B IMMUNIZATION (1 of 3 - Risk 3-dose series) 06/20/1967     ZOSTER IMMUNIZATION (2 of 3) 03/01/2013     MEDICARE ANNUAL WELLNESS VISIT  05/06/2016     DIABETIC FOOT EXAM  11/26/2019     FALL RISK ASSESSMENT  11/26/2019     PHQ-2  01/01/2020     INFLUENZA VACCINE (1) 09/01/2020     EYE EXAM  09/25/2020     A1C  02/24/2021     BMP  08/24/2021     LIPID  08/24/2021     MICROALBUMIN  08/24/2021     COLORECTAL CANCER SCREENING  02/20/2022     DTAP/TDAP/TD IMMUNIZATION (3 - Td) 12/16/2023     HEPATITIS C SCREENING  Completed     PNEUMOCOCCAL IMMUNIZATION 65+ LOW/MEDIUM RISK  Completed     AORTIC ANEURYSM SCREENING (SYSTEM ASSIGNED)  Completed     IPV IMMUNIZATION  Aged Out     MENINGITIS IMMUNIZATION  Aged Out       Exercise for a Healthier Heart     Exercise with a friend. When activity is fun, you're more likely to stick with it.   You may wonder how you can improve the health of your heart. If  you re thinking about exercise, you re on the right track. You don t need to become an athlete, but you do need a certain amount of brisk exercise to help strengthen your heart. If you have been diagnosed with a heart condition, your doctor may recommend exercise to help stabilize your condition. To help make exercise a habit, choose safe, fun activities.  Be sure to check with your healthcare provider before starting an exercise program.  Why exercise?  Exercising regularly offers many healthy rewards. It can help you do all of the following:    Improve your blood cholesterol level to help prevent further heart trouble    Lower your blood pressure to help prevent a stroke or heart attack    Control diabetes, or reduce your risk of getting this disease    Improve your heart and lung function    Reach and maintain a healthy weight    Make your muscles stronger and more limber so you can stay active    Prevent falls and fractures by slowing the loss of bone mass (osteoporosis)    Manage stress better    Reduce your blood pressure    Improve your sense of self and your body image  Exercise tips  Ease into your routine. Set small goals. Then build on them.  Exercise on most days. Aim for a total of 150 or more minutes of moderate to  vigorous intensity activity each week. Consider 40 minutes, 3 to 4 times a week. For best results, activity should last for 40 minutes on average. It is OK to work up to the 40 minute period over time. Examples of moderate-intensity activity is walking 1 mile in 15 minutes or 30 to 45 minutes of yard work.  Step up your daily activity level. Along with your exercise program, try being more active throughout the day. Walk instead of drive. Do more household tasks or yard work.  Choose one or more activities you enjoy. Walking is one of the easiest things you can do. You can also try swimming, riding a bike, dancing, or taking an exercise class.  Stop exercising and call your doctor if  you:    Have chest pain or feel dizzy or lightheaded    Feel burning, tightness, pressure, or heaviness in your chest, neck, shoulders, back, or arms    Have unusual shortness of breath    Have increased joint or muscle pain    Have palpitations or an irregular heartbeat  Date Last Reviewed: 5/1/2016 2000-2019 [employer]. [address] [place] [address]. All rights reserved. This information is not intended as a substitute for professional medical care. Always follow your healthcare professional's instructions.

## 2020-10-03 DIAGNOSIS — E11.65 TYPE 2 DIABETES MELLITUS WITH HYPERGLYCEMIA, WITHOUT LONG-TERM CURRENT USE OF INSULIN (H): ICD-10-CM

## 2020-10-04 NOTE — TELEPHONE ENCOUNTER
Pt is out of medication and pharmacy called mail order for transfer and is unable to get Rx    [id number]

## 2020-10-05 NOTE — TELEPHONE ENCOUNTER
Prescription approved per [facility] Refill Protocol.    Bindu SEO RN, BSN

## 2020-11-03 ENCOUNTER — TRANSFERRED RECORDS (OUTPATIENT)
Dept: HEALTH INFORMATION MANAGEMENT | Facility: CLINIC | Age: 70
End: 2020-11-03

## 2020-11-03 LAB — RETINOPATHY: POSITIVE

## 2020-12-07 ENCOUNTER — TELEPHONE (OUTPATIENT)
Dept: PEDIATRICS | Facility: CLINIC | Age: 70
End: 2020-12-07

## 2020-12-07 DIAGNOSIS — I25.10 CORONARY ARTERY DISEASE INVOLVING NATIVE CORONARY ARTERY OF NATIVE HEART WITHOUT ANGINA PECTORIS: ICD-10-CM

## 2020-12-07 RX ORDER — METOPROLOL SUCCINATE 25 MG/1
12.5 TABLET, EXTENDED RELEASE ORAL DAILY
Qty: 7 TABLET | Refills: 0 | Status: SHIPPED | OUTPATIENT
Start: 2020-12-07 | End: 2021-01-11

## 2020-12-07 RX ORDER — METOPROLOL SUCCINATE 25 MG/1
12.5 TABLET, EXTENDED RELEASE ORAL DAILY
Qty: 45 TABLET | Refills: 3 | Status: SHIPPED | OUTPATIENT
Start: 2020-12-07 | End: 2021-11-17

## 2020-12-07 NOTE — TELEPHONE ENCOUNTER
Medication Question or Refill    Who is calling: Pharmacy    What medication are you calling about (include dose and sig)?: metoprolol succinate ER (TOPROL-XL) 25 MG 24 hr tablet    Controlled Substance Agreement on file: No    Who prescribed the medication?: pcp    Do you need a refill? Yes:     When did you use the medication last? Hasn't ordered it from this pharmacy in a long time.    Patient offered an appointment? NA    Do you have any questions or concerns?  Yes: The pharmacy would like an update on anything the pt is allergic to.     Requested Pharmacy: in chart    Okay to leave a detailed message?: No at Other phone number:  [phone]

## 2020-12-07 NOTE — TELEPHONE ENCOUNTER
Called patient, patient reports he is taking Metoprolol 12.5 mg daily. Only has 2 days left, requested refill from pharmacy over a week ago but never heard back.     Due to delivery time, patient would like short term supply sent to [facility]      Called pharmacy. Pharmacy states they have  RX from 2019, did NOT receive RX sent on 2020 by Dr. Cherry at patients annual exam.     Resent Rx from annual exam and sent short term supply to local pharmacy for patient while waiting for mail order delivery.

## 2020-12-17 ENCOUNTER — TELEPHONE (OUTPATIENT)
Dept: PEDIATRICS | Facility: CLINIC | Age: 70
End: 2020-12-17

## 2020-12-17 DIAGNOSIS — U07.1 2019 NOVEL CORONAVIRUS DISEASE (COVID-19): Primary | ICD-10-CM

## 2020-12-17 NOTE — TELEPHONE ENCOUNTER
Spoke with patient and Wife.     Patient started symptoms Tuesday, 12/15.   Tested at the [facility] with a saliva test at a [place] location on Wed, 12/16.   Received positive test results today, 12/17.     Wife currently received the bamlanivimab infusion through infusion center today.   The Nursing Team there stated that the patient qualified and that he could get into the program by calling [facility].     Called [facility] ([phone]), spoke with Pharmacist.   She reviewed the protocol process with me.   PCP cannot refer directly. Needs to go through Referral process.     Will update Dr. Cherry.

## 2020-12-17 NOTE — TELEPHONE ENCOUNTER
Huddled with Dr. Cherry.   Dr. Cherry entered Covid Antibody referral.   Updated patient that process has started.   He agrees with plan.

## 2020-12-18 ENCOUNTER — HOME INFUSION (PRE-WILLOW HOME INFUSION) (OUTPATIENT)
Dept: PHARMACY | Facility: CLINIC | Age: 70
End: 2020-12-18

## 2020-12-18 DIAGNOSIS — U07.1 2019 NOVEL CORONAVIRUS DISEASE (COVID-19): ICD-10-CM

## 2020-12-18 RX ORDER — HEPARIN SODIUM,PORCINE 10 UNIT/ML
5 VIAL (ML) INTRAVENOUS
Status: CANCELLED | OUTPATIENT
Start: 2020-12-18

## 2020-12-18 RX ORDER — HEPARIN SODIUM (PORCINE) LOCK FLUSH IV SOLN 100 UNIT/ML 100 UNIT/ML
5 SOLUTION INTRAVENOUS
Status: CANCELLED | OUTPATIENT
Start: 2020-12-18

## 2020-12-21 ENCOUNTER — HOME INFUSION (PRE-WILLOW HOME INFUSION) (OUTPATIENT)
Dept: PHARMACY | Facility: CLINIC | Age: 70
End: 2020-12-21

## 2020-12-21 DIAGNOSIS — U07.1 2019 NOVEL CORONAVIRUS DISEASE (COVID-19): ICD-10-CM

## 2020-12-21 NOTE — PROGRESS NOTES
This is a recent snapshot of the patient's [facility] medical record.  For current drug dose and complete information and questions, call [phone] or In Basket pool, [facility] ([id number])  CSN Number:  [id number]

## 2020-12-22 NOTE — PROGRESS NOTES
This is a recent snapshot of the patient's [facility] medical record.  For current drug dose and complete information and questions, call [phone] or In [facility] pool, [facility] ([id number])  CSN Number:  [id number]

## 2020-12-28 DIAGNOSIS — E11.65 TYPE 2 DIABETES MELLITUS WITH HYPERGLYCEMIA, WITHOUT LONG-TERM CURRENT USE OF INSULIN (H): ICD-10-CM

## 2020-12-30 RX ORDER — GLIPIZIDE 5 MG/1
5 TABLET, FILM COATED, EXTENDED RELEASE ORAL DAILY
Qty: 90 TABLET | Refills: 0 | Status: SHIPPED | OUTPATIENT
Start: 2020-12-30 | End: 2021-01-11

## 2020-12-31 ENCOUNTER — HOME INFUSION (PRE-WILLOW HOME INFUSION) (OUTPATIENT)
Dept: PHARMACY | Facility: CLINIC | Age: 70
End: 2020-12-31

## 2021-01-11 ENCOUNTER — OFFICE VISIT (OUTPATIENT)
Dept: PEDIATRICS | Facility: CLINIC | Age: 71
End: 2021-01-11
Payer: COMMERCIAL

## 2021-01-11 VITALS
WEIGHT: 232 LBS | HEIGHT: 69 IN | BODY MASS INDEX: 34.36 KG/M2 | SYSTOLIC BLOOD PRESSURE: 122 MMHG | TEMPERATURE: 97.4 F | HEART RATE: 87 BPM | DIASTOLIC BLOOD PRESSURE: 60 MMHG | OXYGEN SATURATION: 96 % | RESPIRATION RATE: 16 BRPM

## 2021-01-11 DIAGNOSIS — I10 ESSENTIAL HYPERTENSION WITH GOAL BLOOD PRESSURE LESS THAN 140/90: ICD-10-CM

## 2021-01-11 DIAGNOSIS — E78.5 HYPERLIPIDEMIA WITH TARGET LDL LESS THAN 70: ICD-10-CM

## 2021-01-11 DIAGNOSIS — C61 PROSTATE CANCER (H): ICD-10-CM

## 2021-01-11 DIAGNOSIS — E11.65 TYPE 2 DIABETES MELLITUS WITH HYPERGLYCEMIA, WITHOUT LONG-TERM CURRENT USE OF INSULIN (H): Primary | ICD-10-CM

## 2021-01-11 LAB — HBA1C MFR BLD: 7.9 % (ref 0–5.6)

## 2021-01-11 PROCEDURE — 99214 OFFICE O/P EST MOD 30 MIN: CPT | Performed by: INTERNAL MEDICINE

## 2021-01-11 PROCEDURE — 36415 COLL VENOUS BLD VENIPUNCTURE: CPT | Performed by: INTERNAL MEDICINE

## 2021-01-11 PROCEDURE — 83036 HEMOGLOBIN GLYCOSYLATED A1C: CPT | Performed by: INTERNAL MEDICINE

## 2021-01-11 PROCEDURE — 84153 ASSAY OF PSA TOTAL: CPT | Performed by: INTERNAL MEDICINE

## 2021-01-11 RX ORDER — GLIPIZIDE 5 MG/1
5 TABLET, FILM COATED, EXTENDED RELEASE ORAL DAILY
Qty: 90 TABLET | Refills: 1 | Status: SHIPPED | OUTPATIENT
Start: 2021-01-11 | End: 2021-07-19

## 2021-01-11 ASSESSMENT — MIFFLIN-ST. JEOR: SCORE: 1792.73

## 2021-01-11 NOTE — PATIENT INSTRUCTIONS
Honoring Choices:   Call to schedule [phone] (advanced directives)    We will contact you regarding results    Goal : not more than 1 alcoholic drink every other day

## 2021-01-11 NOTE — PROGRESS NOTES
Assessment & Plan     Type 2 diabetes mellitus with hyperglycemia, without long-term current use of insulin (H)      Repeat a1c today.  If above goal discussed adding glargine insulin.    Likely stop actos if a1c at goal (continue taper)  etoh: drinks 2 -3 beer per day, established goal of 1 drink every other day  - metFORMIN (GLUCOPHAGE) 1000 MG tablet; TAKE 1 TABLET BY MOUTH TWICE DAILY WITH MEALS  - glipiZIDE (GLUCOTROL XL) 5 MG 24 hr tablet; Take 1 tablet (5 mg) by mouth daily  - Hemoglobin A1c    Essential hypertension with goal blood pressure less than 140/90  Continue lisinopril, metoprolol    Hyperlipidemia with target LDL less than 70  Adequate control.  Continue current medication.     Prostate cancer (H):[place] 2010  Prior hx of prostate ca, brachytherapy.  Due for labwork  - PSA, tumor marker    Return in about 6 months (around 7/11/2021) for Diabetes follow-up.    Jesus Cherry MD, MD  [facility] WILLIAMS Morales is a 70 year old who presents to clinic today for the following health issues     History of Present Illness       Diabetes:   He presents for follow up of diabetes.  He is checking home blood glucose a few times a month. He checks blood glucose before meals.  Blood glucose is never over 200 and never under 70. He is aware of hypoglycemia symptoms including shakiness. He has no concerns regarding his diabetes at this time.  He is not experiencing numbness or burning in feet, excessive thirst, blurry vision, weight changes or redness, sores or blisters on feet.     He consumes 1 sweetened beverage(s) daily.He exercises with enough effort to increase his heart rate 10 to 19 minutes per day.  He exercises with enough effort to increase his heart rate 3 or less days per week.   He is taking medications regularly.     Diabetes Follow-up      How often are you checking your blood sugar? daily    What concerns do you have today about your diabetes? None     Do you have any of  these symptoms? (Select all that apply)  No numbness or tingling in feet.  No redness, sores or blisters on feet.  No complaints of excessive thirst.  No reports of blurry vision.  No significant changes to weight.              Hyperlipidemia Follow-Up      Are you regularly taking any medication or supplement to lower your cholesterol?   Yes- statin    Are you having muscle aches or other side effects that you think could be caused by your cholesterol lowering medication?  No    Hypertension Follow-up      Do you check your blood pressure regularly outside of the clinic? No     Are you following a low salt diet? No    Are your blood pressures ever more than 140 on the top number (systolic) OR more   than 90 on the bottom number (diastolic), for example 140/90? No    BP Readings from Last 2 Encounters:   01/11/21 122/60   09/28/20 108/60     Hemoglobin A1C (%)   Date Value   08/24/2020 7.3 (H)   10/31/2019 8.4 (H)     LDL Cholesterol Calculated (mg/dL)   Date Value   08/24/2020 43   10/31/2019 59     Patient Active Problem List   Diagnosis     Prostate cancer (H):[place] 2010     Colon polyps     Chronic right shoulder pain- recurrent since 2013     Cervical spinal stenosis     Type 2 diabetes mellitus with hyperglycemia, without long-term current use of insulin (H)     Essential hypertension with goal blood pressure less than 140/90     Hyperlipidemia with target LDL less than 70     Coronary artery disease involving native coronary artery of native heart without angina pectoris     Diverticulosis of large intestine     2019 novel coronavirus disease (COVID-19)     Current Outpatient Medications   Medication Sig Dispense Refill     aspirin 81 MG tablet Take 1 tablet (81 mg) by mouth daily 90 tablet 11     atorvastatin (LIPITOR) 20 MG tablet Take 1 tablet (20 mg) by mouth daily 90 tablet 3     blood glucose (NO BRAND SPECIFIED) test strip Use to test blood sugar 1 times daily or as directed. 100 each 11     blood  "glucose calibration (NO BRAND SPECIFIED) solution Use to calibrate blood glucose monitor as needed as directed. 1 each 11     blood glucose monitoring (NO BRAND SPECIFIED) meter device kit Use to test blood sugar 1 times daily or as directed. 1 kit 0     empagliflozin (JARDIANCE) 25 MG TABS tablet Take 1 tablet (25 mg) by mouth daily 90 tablet 3     glipiZIDE (GLUCOTROL XL) 5 MG 24 hr tablet Take 1 tablet (5 mg) by mouth daily 90 tablet 1     lisinopril (ZESTRIL) 10 MG tablet Take 1 tablet (10 mg) by mouth daily 90 tablet 3     metFORMIN (GLUCOPHAGE) 1000 MG tablet TAKE 1 TABLET BY MOUTH TWICE DAILY WITH MEALS 180 tablet 1     metoprolol succinate ER (TOPROL-XL) 25 MG 24 hr tablet Take 0.5 tablets (12.5 mg) by mouth daily 45 tablet 3     multivitamin, therapeutic (THERA-VIT) TABS tablet Take 1 tablet by mouth daily       nitroglycerin (NITROSTAT) 0.4 MG SL tablet Place 1 tablet (0.4 mg) under the tongue every 5 minutes as needed for chest pain 30 tablet 6     omeprazole 20 MG tablet Take 20 mg by mouth daily.       pioglitazone (ACTOS) 15 MG tablet TAKE 1 TABLET EVERY DAY 90 tablet 1     tamsulosin (FLOMAX) 0.4 MG capsule Take 1 capsule (0.4 mg) by mouth daily 90 capsule 3     thin (NO BRAND SPECIFIED) lancets Use to test blood sugar 1 times daily or as directed. 100 each 11            Objective    /60 (Cuff Size: Adult Large)   Pulse 87   Temp 97.4  F (36.3  C) (Tympanic)   Resp 16   Ht 1.753 m (5' 9\")   Wt 105.2 kg (232 lb)   SpO2 96%   BMI 34.26 kg/m    Body mass index is 34.26 kg/m .  Physical Exam   GENERAL: healthy, alert and no distress  NECK: no adenopathy, no asymmetry, masses, or scars and thyroid normal to palpation  RESP: lungs clear to auscultation - no rales, rhonchi or wheezes  CV: regular rate and rhythm, normal S1 S2, no S3 or S4, no murmur, click or rub, no peripheral edema and peripheral pulses strong  MS: no gross musculoskeletal defects noted, no edema  SKIN: no suspicious lesions " or rashes  NEURO: Normal strength and tone, mentation intact and speech normal

## 2021-01-12 LAB — PSA SERPL-MCNC: <0.01 UG/L (ref 0–4)

## 2021-02-03 NOTE — PROGRESS NOTES
This is a recent snapshot of the patient's [facility] medical record.  For current drug dose and complete information and questions, call [phone] or In Basket pool, [facility] ([id number])  CSN Number:  [id number]

## 2021-03-10 ENCOUNTER — TELEPHONE (OUTPATIENT)
Dept: EDUCATION SERVICES | Facility: CLINIC | Age: 71
End: 2021-03-10

## 2021-03-10 DIAGNOSIS — E11.65 TYPE 2 DIABETES MELLITUS WITH HYPERGLYCEMIA, WITHOUT LONG-TERM CURRENT USE OF INSULIN (H): Primary | ICD-10-CM

## 2021-03-10 NOTE — TELEPHONE ENCOUNTER
Diabetes Education Scheduling Request    Héctor Quispe would benefit from proactive diabetes education scheduling outreach per the [facility] Primary Care Transformation model. Please help patient to schedule diabetes education (scheduling script below). Document scheduling attempts and once scheduled, please route this chart to  SB2/3/4 TEAM B (MADYSON) [[id number]].     If patient has not been reached to schedule after 3 attempts (2 phone calls and letter or MyChart message) or declined to schedule, please route back to P Diab Ed-Patient Care.    Patient Outreach Script    Tenzin Anaya,    I'm calling on behalf of your primary care provider, Dr. Cherry, at the [facility]. Your care with Dr. Cherry offers an integrated service with diabetes education to help you improve your diabetes. Dr. Cherry would like to invite you to work with one of our clinic's diabetes care and education specialists.    -These specially trained nurses and registered dietitians are an important part of the team that will help you improve your diabetes management. They offer diabetes and nutrition education and work directly with your provider to help adjust medications, if needed.     -Most visits can be done virtually.     -Diabetes education is usually covered yearly by most insurance plans, but please check with your insurance company regarding coverage.     May I help to schedule this appointment with you?     Thank you!

## 2021-03-10 NOTE — TELEPHONE ENCOUNTER
Diabetes Education Scheduling Outreach #1:    Call to patient to schedule. Patient is scheduled for 04/07. He is currently in [place].    Zulema Hansen  [facility] [facility]  Diabetes and Nutrition Scheduling

## 2021-03-16 NOTE — TELEPHONE ENCOUNTER
This patient has been scheduled for the Primary Care Transformation CDE Integration program on date 4/7/2021.    Please complete and sign referral, sign any additional orders that may have been placed, and add A1C goal to problem list (edit display name for diabetes diagnosis).    Close encounter once complete.    Nisha Lemus on 3/16/2021 at 7:53 AM

## 2021-03-27 DIAGNOSIS — E11.65 TYPE 2 DIABETES MELLITUS WITH HYPERGLYCEMIA, WITHOUT LONG-TERM CURRENT USE OF INSULIN (H): ICD-10-CM

## 2021-03-29 RX ORDER — GLIPIZIDE 5 MG/1
5 TABLET, FILM COATED, EXTENDED RELEASE ORAL DAILY
Qty: 90 TABLET | Refills: 1 | OUTPATIENT
Start: 2021-03-29

## 2021-03-29 RX ORDER — PIOGLITAZONEHYDROCHLORIDE 15 MG/1
TABLET ORAL
Qty: 90 TABLET | Refills: 1 | OUTPATIENT
Start: 2021-03-29

## 2021-03-31 DIAGNOSIS — E11.65 TYPE 2 DIABETES MELLITUS WITH HYPERGLYCEMIA, WITHOUT LONG-TERM CURRENT USE OF INSULIN (H): ICD-10-CM

## 2021-03-31 RX ORDER — PIOGLITAZONEHYDROCHLORIDE 15 MG/1
TABLET ORAL
Qty: 90 TABLET | Refills: 0 | Status: SHIPPED | OUTPATIENT
Start: 2021-03-31 | End: 2021-06-18

## 2021-03-31 NOTE — TELEPHONE ENCOUNTER
Prescription approved per [facility] Refill Protocol.    Jennifer Araya RN

## 2021-03-31 NOTE — TELEPHONE ENCOUNTER
[facility] called and stated that the patient is in need of a refill for this medication.  They sent a previous request that was denied but the records do show that a 6 month supply was sent on 9/28/2020 and the patient will need this as soon as possible for mail delivery.         Nisha Lemus

## 2021-04-07 ENCOUNTER — PATIENT OUTREACH (OUTPATIENT)
Dept: EDUCATION SERVICES | Facility: CLINIC | Age: 71
End: 2021-04-07
Payer: COMMERCIAL

## 2021-04-07 DIAGNOSIS — E11.65 TYPE 2 DIABETES MELLITUS WITH HYPERGLYCEMIA, WITHOUT LONG-TERM CURRENT USE OF INSULIN (H): ICD-10-CM

## 2021-04-07 PROCEDURE — G0108 DIAB MANAGE TRN  PER INDIV: HCPCS | Mod: 95 | Performed by: DIETITIAN, REGISTERED

## 2021-04-07 NOTE — PROGRESS NOTES
"Diabetes Self-Management Education & Support    Presents for: Individual review    Type of Service: Telephone Visit    Audio only visit done, as patient does not have access to audio-visual technology.    Individual visit provided, given no group classes are available for 2 months.     How would patient like to obtain AVS? Angelica      SUBJECTIVE/OBJECTIVE:  Presents for: Individual review  Accompanied by: Self  Diabetes education in the past 24mo: No  Focus of Visit: Patient Unsure  Diabetes type: Type 2  Date of diagnosis: 1994  Disease course: Getting harder to manage  How confident are you filling out medical forms by yourself:: Not Assessed  Diabetes management related comments/concerns: started checking blood sugars past few days before appointment, \"numbers have been good\", but also paying closer attention to diet  Transportation concerns: No  Other concerns:: Glasses  Cultural Influences/Ethnic Background:  American    Diabetes Symptoms & Complications:  Fatigue: No  Neuropathy: No  Polydipsia: No  Polyphagia: No  Polyuria: No  Visual change: No  Symptom course: Stable  Weight trend: Fluctuating  Complications assessed today?: Yes  Autonomic neuropathy: No  CVA: No  Heart disease: Yes  Nephropathy: No  Peripheral neuropathy: No  Retinopathy: No    Patient Problem List and Family Medical History reviewed for relevant medical history, current medical status, and diabetes risk factors.    Vitals:  There were no vitals taken for this visit.  Estimated body mass index is 34.26 kg/m  as calculated from the following:    Height as of 1/11/21: 1.753 m (5' 9\").    Weight as of 1/11/21: 105.2 kg (232 lb).   Last 3 BP:   BP Readings from Last 3 Encounters:   01/11/21 122/60   09/28/20 108/60   10/31/19 122/70       History   Smoking Status     Former Smoker     Types: Cigarettes     Quit date: 8/2/2002   Smokeless Tobacco     Never Used       Labs:  Lab Results   Component Value Date    A1C 7.9 01/11/2021     Lab " "Results   Component Value Date     08/24/2020     Lab Results   Component Value Date    LDL 43 08/24/2020     HDL Cholesterol   Date Value Ref Range Status   08/24/2020 57 >39 mg/dL Final   ]  GFR Estimate   Date Value Ref Range Status   08/24/2020 79 >60 mL/min/[1.73_m2] Final     Comment:     Non  GFR Calc  Starting 12/18/2018, serum creatinine based estimated GFR (eGFR) will be   calculated using the Chronic Kidney Disease Epidemiology Collaboration   (CKD-EPI) equation.       GFR Estimate If Black   Date Value Ref Range Status   08/24/2020 >90 >60 mL/min/[1.73_m2] Final     Comment:      GFR Calc  Starting 12/18/2018, serum creatinine based estimated GFR (eGFR) will be   calculated using the Chronic Kidney Disease Epidemiology Collaboration   (CKD-EPI) equation.       Lab Results   Component Value Date    CR 0.95 08/24/2020     No results found for: MICROALBUMIN    Healthy Eating:  Healthy Eating Assessed Today: Yes  Meal planning/habits: Avoiding sweets  Meals include: Breakfast, Lunch, Dinner  Breakfast: cereal (wheat chex or grape nuts) or toast with peanut butter  Lunch: soup or left overs  Dinner: protein, vegetable, side  Snacks: salty snacks  Other: \"biggest problem is snacking\"  Beverages: Diet soda, Coffee, Water  Has patient met with a dietitian in the past?: Yes    Being Active:  Being Active Assessed Today: Yes  Exercise:: Yes  Days per week of moderate to strenuous exercise (like a brisk walk): (amount of walking varies)  How intense was your typical exercise? : Light (like stretching or slow walking)    Monitoring:  Monitoring Assessed Today: Yes  Times checking blood sugar at home (number): Other  Blood glucose trend: Other    Date Breakfast  Lunch  Dinner  Bedtime    Before After Before After Before After    4/4 107         4/5 118    152     4/6 133    108     4/5 99             Taking Medications:  Diabetes Medication(s)     Biguanides       metFORMIN " (GLUCOPHAGE) 1000 MG tablet    TAKE 1 TABLET BY MOUTH TWICE DAILY WITH MEALS    Sodium-Glucose Co-Transporter 2 (SGLT2) Inhibitors       empagliflozin (JARDIANCE) 25 MG TABS tablet    Take 1 tablet (25 mg) by mouth daily    Sulfonylureas       glipiZIDE (GLUCOTROL XL) 5 MG 24 hr tablet    Take 1 tablet (5 mg) by mouth daily    Insulin Sensitizing Agents       pioglitazone (ACTOS) 15 MG tablet    TAKE 1 TABLET EVERY DAY          Taking Medication Assessed Today: Yes  Current Treatments: Oral Medication (taken by mouth)  Problems taking diabetes medications regularly?: No  Diabetes medication side effects?: No    Problem Solving:  Problem Solving Assessed Today: Yes  Is the patient at risk for hypoglycemia?: Yes  Hypoglycemia Frequency: Rarely  Hypoglycemia Treatment: Other food, Candy    Hypoglycemia symptoms  Nervousness/Anxiety: Yes    Hypoglycemia Complications  Blackouts: No  Hospitalization: No  Nocturnal hypoglycemia: No  Required assistance: No  Required glucagon injection: No  Seizures: No    Reducing Risks:  Reducing Risks Assessed Today: Yes  Has dilated eye exam at least once a year?: Yes  Sees dentist every 6 months?: Yes  Feet checked by healthcare provider in the last year?: Yes    Healthy Coping:  Healthy Coping Assessed Today: Yes  Emotional response to diabetes: Acceptance  Informal Support system:: Spouse  Stage of change: ACTION (Actively working towards change)  Patient Activation Measure Survey Score:  SUE Score (Last Two) 5/6/2015 1/7/2021   SUE Raw Score 39 36   Activation Score 56.4 75.5   SUE Level 3 4       Diabetes knowledge and skills assessment:   Patient is knowledgeable in diabetes management concepts related to: Healthy Eating, Being Active, Monitoring, Taking Medication, Problem Solving, Reducing Risks and Healthy Coping    Patient needs further education on the following diabetes management concepts: Healthy Eating, Being Active, Monitoring, Taking Medication, Problem Solving,  "Reducing Risks and Healthy Coping    Based on learning assessment above, most appropriate setting for further diabetes education would be: Group class or Individual setting.      INTERVENTION:  Education provided today on:  AADE Self-Care Behaviors:  Healthy Eating: weight reduction, portion control and mindful eating, limiting snacks  Being Active: relationship to blood glucose  Monitoring: individual blood glucose targets and frequency of monitoring  Taking Medication: action of prescribed medication and side effects of prescribed medications  Problem Solving: high blood glucose - causes, signs/symptoms, treatment and prevention, low blood glucose - causes, signs/symptoms, treatment and prevention and when to call health care provider  Reducing Risks: prevention, early diagnostic measures and treatment of complications, A1C - goals, relating to blood glucose levels, how often to check, lipids levels and goals and blood pressure and goals  Healthy Coping: benefits of making appropriate lifestyle changes and utilize support systems    Opportunities for ongoing education and support in diabetes-self management were discussed.    Pt verbalized understanding of concepts discussed and recommendations provided today.       Education Materials Provided:  [employer] Healthy Living with Diabetes Book and BG Log Sheet      ASSESSMENT:  Pt reports since started testing again a few days ago, has been more mindful of food choices and has reduced snacking. BG values typically in target. Reports no issues with DM meds, has good understanding of DM self care, \"knows what he has to do\". Provided review and reinforcement of healthy lifestyle and wt mgmt. Pt receptive. Offered continued follow up, pt will contact CDE as questions arise.    Patient's most recent   Lab Results   Component Value Date    A1C 7.9 01/11/2021    is not meeting goal of <7.0    PLAN  See Patient Instructions for co-developed, patient-stated behavior change " goals.  AVS printed and provided to patient today. See Follow-Up section for recommended follow-up.    Bharti Joe RD, [facility]  Diabetes     Time Spent: 30 minutes  Encounter Type: Individual    Any diabetes medication dose changes were made via the CDE Protocol and Collaborative Practice Agreement with the patient's primary care provider. A copy of this encounter was shared with the provider.

## 2021-04-07 NOTE — PATIENT INSTRUCTIONS
Care Plan:  Meal Plan Recommendation: continue to use portion control, and limit the snacks.    Check blood sugars: once daily, try to rotate testing times.  (A1c target: less than 7%, Blood sugar target before meals:  mg/dl, 2 hrs after the start of a meal: less than 180 mg/dl)    Medication:  No changes    Exercise / activity plan: aim for 30 minutes of walking 5 times per week    Diabetes Support Resources:  Websites: American Diabetes Association: www.diabetes.org    Follow up:  Please call, e-mail,  or send [employer] message with questions, concerns or if follow-up is needed.    Bharti Joe RD, LD, [facility]  Diabetes     [facility] Diabetes Education and Nutrition Services for the [facility]:    For Your Diabetes or Nutrition Education Appointments   Call: [phone]   For Diabetes or Nutrition Related Questions   Call: [phone]

## 2021-04-09 ENCOUNTER — IMMUNIZATION (OUTPATIENT)
Dept: NURSING | Facility: CLINIC | Age: 71
End: 2021-04-09
Payer: COMMERCIAL

## 2021-04-09 PROCEDURE — 0001A PR COVID VAC PFIZER DIL RECON 30 MCG/0.3 ML IM: CPT

## 2021-04-09 PROCEDURE — 91300 PR COVID VAC PFIZER DIL RECON 30 MCG/0.3 ML IM: CPT

## 2021-04-12 ENCOUNTER — MYC MEDICAL ADVICE (OUTPATIENT)
Dept: PEDIATRICS | Facility: CLINIC | Age: 71
End: 2021-04-12

## 2021-04-12 DIAGNOSIS — E11.65 TYPE 2 DIABETES MELLITUS WITH HYPERGLYCEMIA, WITHOUT LONG-TERM CURRENT USE OF INSULIN (H): ICD-10-CM

## 2021-04-12 NOTE — TELEPHONE ENCOUNTER
Patient is requesting Rx be faxed to [facility] ([facility])    No fax found in office.    Routing to PCP to sign/print and team can fax.    [facility]:    [facility]  [address]  [place] [facility]  [place]    Fax#: Toll-free: [phone]  International: [phone]

## 2021-04-13 NOTE — TELEPHONE ENCOUNTER
Prescription placed at  for patient to  with ID.     [employer] message sent to patient notifying him that the prescription is ready for .     Nisha Lemus

## 2021-04-30 ENCOUNTER — IMMUNIZATION (OUTPATIENT)
Dept: NURSING | Facility: CLINIC | Age: 71
End: 2021-04-30
Attending: INTERNAL MEDICINE
Payer: COMMERCIAL

## 2021-04-30 PROCEDURE — 0002A PR COVID VAC PFIZER DIL RECON 30 MCG/0.3 ML IM: CPT

## 2021-04-30 PROCEDURE — 91300 PR COVID VAC PFIZER DIL RECON 30 MCG/0.3 ML IM: CPT

## 2021-06-18 DIAGNOSIS — E11.65 TYPE 2 DIABETES MELLITUS WITH HYPERGLYCEMIA, WITHOUT LONG-TERM CURRENT USE OF INSULIN (H): ICD-10-CM

## 2021-06-18 RX ORDER — PIOGLITAZONEHYDROCHLORIDE 15 MG/1
TABLET ORAL
Qty: 90 TABLET | Refills: 0 | Status: SHIPPED | OUTPATIENT
Start: 2021-06-18 | End: 2021-09-22

## 2021-07-19 ENCOUNTER — OFFICE VISIT (OUTPATIENT)
Dept: PEDIATRICS | Facility: CLINIC | Age: 71
End: 2021-07-19
Payer: COMMERCIAL

## 2021-07-19 VITALS
BODY MASS INDEX: 34.11 KG/M2 | OXYGEN SATURATION: 96 % | WEIGHT: 231 LBS | RESPIRATION RATE: 16 BRPM | HEART RATE: 83 BPM | TEMPERATURE: 97.9 F | SYSTOLIC BLOOD PRESSURE: 108 MMHG | DIASTOLIC BLOOD PRESSURE: 60 MMHG

## 2021-07-19 DIAGNOSIS — L30.9 DERMATITIS: ICD-10-CM

## 2021-07-19 DIAGNOSIS — E11.65 TYPE 2 DIABETES MELLITUS WITH HYPERGLYCEMIA, WITHOUT LONG-TERM CURRENT USE OF INSULIN (H): Primary | ICD-10-CM

## 2021-07-19 DIAGNOSIS — E78.5 HYPERLIPIDEMIA WITH TARGET LDL LESS THAN 70: ICD-10-CM

## 2021-07-19 DIAGNOSIS — R01.1 HEART MURMUR: ICD-10-CM

## 2021-07-19 DIAGNOSIS — I10 ESSENTIAL HYPERTENSION WITH GOAL BLOOD PRESSURE LESS THAN 140/90: ICD-10-CM

## 2021-07-19 PROCEDURE — 99214 OFFICE O/P EST MOD 30 MIN: CPT | Performed by: INTERNAL MEDICINE

## 2021-07-19 RX ORDER — LISINOPRIL 10 MG/1
10 TABLET ORAL DAILY
Qty: 90 TABLET | Refills: 3 | Status: SHIPPED | OUTPATIENT
Start: 2021-07-19 | End: 2022-04-18

## 2021-07-19 RX ORDER — ATORVASTATIN CALCIUM 20 MG/1
20 TABLET, FILM COATED ORAL DAILY
Qty: 90 TABLET | Refills: 3 | Status: SHIPPED | OUTPATIENT
Start: 2021-07-19 | End: 2021-11-17

## 2021-07-19 RX ORDER — GLIPIZIDE 5 MG/1
5 TABLET, FILM COATED, EXTENDED RELEASE ORAL DAILY
Qty: 90 TABLET | Refills: 1 | Status: SHIPPED | OUTPATIENT
Start: 2021-07-19 | End: 2021-10-18

## 2021-07-19 RX ORDER — TRIAMCINOLONE ACETONIDE 1 MG/G
CREAM TOPICAL 2 TIMES DAILY PRN
Qty: 80 G | Refills: 1 | Status: SHIPPED | OUTPATIENT
Start: 2021-07-19

## 2021-07-19 RX ORDER — TAMSULOSIN HYDROCHLORIDE 0.4 MG/1
0.4 CAPSULE ORAL DAILY
Qty: 90 CAPSULE | Refills: 3 | Status: SHIPPED | OUTPATIENT
Start: 2021-07-19 | End: 2022-04-18

## 2021-07-19 NOTE — PROGRESS NOTES
Assessment & Plan     (E11.65) Type 2 diabetes mellitus with hyperglycemia, without long-term current use of insulin (H)  (primary encounter diagnosis)  Comment: Type 2 diabetes, suboptimal control.  Current Rx: Metformin 2000 mg daily, Jardiance 25 mg daily, Actos 15 mg daily, glipizide 5 mg daily.  Patient will return for fasting lab draw.  If A1c greater than 8 consider discontinuing sulfonylurea and starting once daily glargine insulin vs increasing pioglitazone.  Unable to start GLP-1 secondary to cost.  Plan: glipiZIDE (GLUCOTROL XL) 5 MG 24 hr tablet,         metFORMIN (GLUCOPHAGE) 1000 MG tablet,         Hemoglobin A1c, Lipid panel reflex to direct         LDL Fasting, Comprehensive metabolic panel,         Albumin Random Urine Quantitative with Creat         Ratio          (I10) Essential hypertension with goal blood pressure less than 140/90  Comment:    Adequate control.  Continue current medication.   Plan: lisinopril (ZESTRIL) 10 MG tablet          (E78.5) Hyperlipidemia with target LDL less than 70  Comment: Adequate control.  Continue current medication.   Plan: atorvastatin (LIPITOR) 20 MG tablet          (L30.9) Dermatitis  Comment: refill  Plan: triamcinolone (KENALOG) 0.1 % external cream    Heart murmur-plan for echo          Return in about 3 months (around 10/19/2021).    Jesus Cherry MD  [facility] WILLIAMS Morales is a 71 year old who presents for the following health issues     History of Present Illness       Diabetes:   He presents for follow up of diabetes.  He is checking home blood glucose a few times a month. He checks blood glucose before meals.  Blood glucose is sometimes over 200 and never under 70. He is aware of hypoglycemia symptoms including shakiness. He has no concerns regarding his diabetes at this time.  He is not experiencing numbness or burning in feet, excessive thirst, blurry vision, weight changes or redness, sores or blisters on feet.          He eats 2-3 servings of fruits and vegetables daily.He consumes 0 sweetened beverage(s) daily.He exercises with enough effort to increase his heart rate 10 to 19 minutes per day.  He exercises with enough effort to increase his heart rate 4 days per week.   He is taking medications regularly.       BP Readings from Last 2 Encounters:   07/19/21 108/60   01/11/21 122/60     Hemoglobin A1C (%)   Date Value   01/11/2021 7.9 (H)   08/24/2020 7.3 (H)     LDL Cholesterol Calculated (mg/dL)   Date Value   08/24/2020 43   10/31/2019 59         Patient Active Problem List   Diagnosis     Prostate cancer (H):[place] 2010     Colon polyps     Chronic right shoulder pain- recurrent since 2013     Cervical spinal stenosis     Type 2 diabetes mellitus with hyperglycemia, without long-term current use of insulin (H)     Essential hypertension with goal blood pressure less than 140/90     Hyperlipidemia with target LDL less than 70     Coronary artery disease involving native coronary artery of native heart without angina pectoris     Diverticulosis of large intestine     2019 novel coronavirus disease (COVID-19)     Past Medical History:   Diagnosis Date     2019 novel coronavirus disease (COVID-19) 12/18/2020     Coronary artery disease involving native coronary artery of native heart without angina pectoris 1/6/2016     Essential hypertension 1/6/2016     Type 2 diabetes mellitus with diabetic nephropathy (H) 1/6/2016       Past Surgical History:   Procedure Laterality Date     BRACHYTHERAPY RADIOELEMENTS  2011    brachytherapy for prostate ca     CYSTOSCOPY       ENT SURGERY       STENT, CORONARY, LUIS M      h/o coronary stent x 2       Current Outpatient Medications   Medication Sig Dispense Refill     aspirin 81 MG tablet Take 1 tablet (81 mg) by mouth daily 90 tablet 11     atorvastatin (LIPITOR) 20 MG tablet Take 1 tablet (20 mg) by mouth daily 90 tablet 3     blood glucose (NO BRAND SPECIFIED) test strip Use to test  blood sugar 1 times daily or as directed. 100 each 11     blood glucose calibration (NO BRAND SPECIFIED) solution Use to calibrate blood glucose monitor as needed as directed. 1 each 11     blood glucose monitoring (NO BRAND SPECIFIED) meter device kit Use to test blood sugar 1 times daily or as directed. 1 kit 0     empagliflozin (JARDIANCE) 25 MG TABS tablet Take 1 tablet (25 mg) by mouth daily 90 tablet 3     glipiZIDE (GLUCOTROL XL) 5 MG 24 hr tablet Take 1 tablet (5 mg) by mouth daily 90 tablet 1     lisinopril (ZESTRIL) 10 MG tablet Take 1 tablet (10 mg) by mouth daily 90 tablet 3     metFORMIN (GLUCOPHAGE) 1000 MG tablet TAKE 1 TABLET BY MOUTH TWICE DAILY WITH MEALS 180 tablet 1     metoprolol succinate ER (TOPROL-XL) 25 MG 24 hr tablet Take 0.5 tablets (12.5 mg) by mouth daily 45 tablet 3     multivitamin, therapeutic (THERA-VIT) TABS tablet Take 1 tablet by mouth daily       nitroglycerin (NITROSTAT) 0.4 MG SL tablet Place 1 tablet (0.4 mg) under the tongue every 5 minutes as needed for chest pain 30 tablet 6     omeprazole 20 MG tablet Take 20 mg by mouth daily.       pioglitazone (ACTOS) 15 MG tablet TAKE 1 TABLET EVERY DAY 90 tablet 0     tamsulosin (FLOMAX) 0.4 MG capsule Take 1 capsule (0.4 mg) by mouth daily 90 capsule 3     thin (NO BRAND SPECIFIED) lancets Use to test blood sugar 1 times daily or as directed. 100 each 11     triamcinolone (KENALOG) 0.1 % external cream Apply topically 2 times daily as needed for irritation 80 g 1        ALLERGIES   No Known Allergies      Review of Systems   Constitutional, HEENT, cardiovascular, pulmonary, gi and gu systems are negative, except as otherwise noted.      Objective    /60 (Cuff Size: Adult Large)   Pulse 83   Temp 97.9  F (36.6  C) (Tympanic)   Resp 16   Wt 104.8 kg (231 lb)   SpO2 96%   BMI 34.11 kg/m    Body mass index is 34.11 kg/m .  Physical Exam   GENERAL: healthy, alert and no distress  HENT: ear canals and TM's normal, nose and  mouth without ulcers or lesions  NECK: no adenopathy, no asymmetry, masses, or scars and thyroid normal to palpation  RESP: lungs clear to auscultation - no rales, rhonchi or wheezes  CV: regular rate and rhythm, normal S1 S2, no S3 or S4, 2/6 systolic murmur present, no peripheral edema and peripheral pulses strong  ABDOMEN: soft, nontender, no hepatosplenomegaly, no masses and bowel sounds normal  MS: no gross musculoskeletal defects noted, no edema

## 2021-08-05 ENCOUNTER — LAB (OUTPATIENT)
Dept: LAB | Facility: CLINIC | Age: 71
End: 2021-08-05
Payer: COMMERCIAL

## 2021-08-05 DIAGNOSIS — E11.65 TYPE 2 DIABETES MELLITUS WITH HYPERGLYCEMIA, WITHOUT LONG-TERM CURRENT USE OF INSULIN (H): ICD-10-CM

## 2021-08-05 LAB — HBA1C MFR BLD: 6.8 % (ref 0–5.6)

## 2021-08-05 PROCEDURE — 80061 LIPID PANEL: CPT

## 2021-08-05 PROCEDURE — 80053 COMPREHEN METABOLIC PANEL: CPT

## 2021-08-05 PROCEDURE — 36415 COLL VENOUS BLD VENIPUNCTURE: CPT

## 2021-08-05 PROCEDURE — 83036 HEMOGLOBIN GLYCOSYLATED A1C: CPT

## 2021-08-05 PROCEDURE — 82043 UR ALBUMIN QUANTITATIVE: CPT

## 2021-08-06 ENCOUNTER — HOSPITAL ENCOUNTER (OUTPATIENT)
Dept: CARDIOLOGY | Facility: CLINIC | Age: 71
Discharge: HOME | End: 2021-08-06
Attending: INTERNAL MEDICINE | Admitting: INTERNAL MEDICINE
Payer: COMMERCIAL

## 2021-08-06 DIAGNOSIS — R01.1 HEART MURMUR: ICD-10-CM

## 2021-08-06 LAB
ALBUMIN SERPL-MCNC: 4 G/DL (ref 3.4–5)
ALP SERPL-CCNC: 59 U/L (ref 40–150)
ALT SERPL W P-5'-P-CCNC: 26 U/L (ref 0–70)
ANION GAP SERPL CALCULATED.3IONS-SCNC: 7 MMOL/L (ref 3–14)
AST SERPL W P-5'-P-CCNC: 14 U/L (ref 0–45)
BILIRUB SERPL-MCNC: 0.5 MG/DL (ref 0.2–1.3)
BUN SERPL-MCNC: 23 MG/DL (ref 7–30)
CALCIUM SERPL-MCNC: 9.3 MG/DL (ref 8.5–10.1)
CHLORIDE BLD-SCNC: 103 MMOL/L (ref 94–109)
CHOLEST SERPL-MCNC: 150 MG/DL
CO2 SERPL-SCNC: 25 MMOL/L (ref 20–32)
CREAT SERPL-MCNC: 1.05 MG/DL (ref 0.66–1.25)
CREAT UR-MCNC: 47 MG/DL
FASTING STATUS PATIENT QL REPORTED: YES
GFR SERPL CREATININE-BSD FRML MDRD: 70 ML/MIN/1.73M2
GLUCOSE BLD-MCNC: 140 MG/DL (ref 70–99)
HDLC SERPL-MCNC: 69 MG/DL
LDLC SERPL CALC-MCNC: 48 MG/DL
LVEF ECHO: NORMAL
MICROALBUMIN UR-MCNC: 38 MG/L
MICROALBUMIN/CREAT UR: 80.85 MG/G CR (ref 0–17)
NONHDLC SERPL-MCNC: 81 MG/DL
POTASSIUM BLD-SCNC: 4.6 MMOL/L (ref 3.4–5.3)
PROT SERPL-MCNC: 7 G/DL (ref 6.8–8.8)
SODIUM SERPL-SCNC: 135 MMOL/L (ref 133–144)
TRIGL SERPL-MCNC: 165 MG/DL

## 2021-08-06 PROCEDURE — C8929 TTE W OR WO FOL WCON,DOPPLER: HCPCS

## 2021-08-06 PROCEDURE — 255N000002 HC RX 255 OP 636: Performed by: INTERNAL MEDICINE

## 2021-08-06 PROCEDURE — 93306 TTE W/DOPPLER COMPLETE: CPT | Performed by: INTERNAL MEDICINE

## 2021-08-06 PROCEDURE — 999N000208 ECHOCARDIOGRAM COMPLETE

## 2021-08-06 RX ADMIN — HUMAN ALBUMIN MICROSPHERES AND PERFLUTREN 2 ML: 10; .22 INJECTION, SOLUTION INTRAVENOUS at 10:22

## 2021-08-09 PROBLEM — I35.0 AORTIC STENOSIS, MODERATE: Status: ACTIVE | Noted: 2021-08-09

## 2021-10-18 ENCOUNTER — OFFICE VISIT (OUTPATIENT)
Dept: PEDIATRICS | Facility: CLINIC | Age: 71
End: 2021-10-18
Payer: COMMERCIAL

## 2021-10-18 VITALS
HEART RATE: 87 BPM | DIASTOLIC BLOOD PRESSURE: 68 MMHG | RESPIRATION RATE: 16 BRPM | TEMPERATURE: 98.3 F | OXYGEN SATURATION: 95 % | SYSTOLIC BLOOD PRESSURE: 118 MMHG | WEIGHT: 236 LBS | BODY MASS INDEX: 34.85 KG/M2

## 2021-10-18 DIAGNOSIS — E11.65 TYPE 2 DIABETES MELLITUS WITH HYPERGLYCEMIA, WITHOUT LONG-TERM CURRENT USE OF INSULIN (H): Primary | ICD-10-CM

## 2021-10-18 DIAGNOSIS — E78.5 HYPERLIPIDEMIA WITH TARGET LDL LESS THAN 70: ICD-10-CM

## 2021-10-18 DIAGNOSIS — I35.0 AORTIC STENOSIS, MODERATE: ICD-10-CM

## 2021-10-18 DIAGNOSIS — I25.10 CORONARY ARTERY DISEASE INVOLVING NATIVE CORONARY ARTERY OF NATIVE HEART WITHOUT ANGINA PECTORIS: ICD-10-CM

## 2021-10-18 DIAGNOSIS — I10 ESSENTIAL HYPERTENSION WITH GOAL BLOOD PRESSURE LESS THAN 140/90: ICD-10-CM

## 2021-10-18 PROCEDURE — 99214 OFFICE O/P EST MOD 30 MIN: CPT | Performed by: INTERNAL MEDICINE

## 2021-10-18 PROCEDURE — 99207 PR FOOT EXAM NO CHARGE: CPT | Mod: 25 | Performed by: INTERNAL MEDICINE

## 2021-10-18 RX ORDER — GLIPIZIDE 5 MG/1
5 TABLET, FILM COATED, EXTENDED RELEASE ORAL DAILY
Qty: 90 TABLET | Refills: 1 | Status: SHIPPED | OUTPATIENT
Start: 2021-10-18 | End: 2022-04-18

## 2021-10-18 RX ORDER — PIOGLITAZONEHYDROCHLORIDE 15 MG/1
TABLET ORAL
Qty: 90 TABLET | Refills: 1 | Status: SHIPPED | OUTPATIENT
Start: 2021-10-18 | End: 2022-04-18

## 2021-10-18 NOTE — PROGRESS NOTES
Assessment & Plan     (E11.65) Type 2 diabetes mellitus with hyperglycemia, without long-term current use of insulin (H)  (primary encounter diagnosis)  Comment: well controlled, continue current rx  Plan: glipiZIDE (GLUCOTROL XL) 5 MG 24 hr tablet,         metFORMIN (GLUCOPHAGE) 1000 MG tablet,         pioglitazone (ACTOS) 15 MG tablet, FOOT EXAM          (I25.10) Coronary artery disease involving native coronary artery of native heart without angina pectoris  (I35.0) Aortic stenosis, moderate  Comment: recent ECHO results reviewed with pt.   H/o CAD and moderate aortic stenosis (asymptomatic), plan for follow-up with cardiology  Plan: Adult Cardiology Eval Referral          (I10) Essential hypertension with goal blood pressure less than 140/90  Comment:   Plan: well controlled    (E78.5) Hyperlipidemia with target LDL less than 70  Comment:   Plan: Adequate control.  Continue atorvastatin      Return in about 6 months (around 4/18/2022) for Diabetes follow-up.    Jesus Cherry MD  [facility] WILLIAMS Morales is a 71 year old who presents for the following health issues     History of Present Illness       Diabetes:   He presents for follow up of diabetes.  He is checking home blood glucose a few times a month. He checks blood glucose before and after meals.  Blood glucose is never over 200 and never under 70. He is aware of hypoglycemia symptoms including shakiness. He has no concerns regarding his diabetes at this time.  He is not experiencing numbness or burning in feet, excessive thirst, blurry vision, weight changes or redness, sores or blisters on feet.         He eats 2-3 servings of fruits and vegetables daily.He consumes 0 sweetened beverage(s) daily.He exercises with enough effort to increase his heart rate 10 to 19 minutes per day.  He exercises with enough effort to increase his heart rate 3 or less days per week.   He is taking medications regularly.     Diabetes  Follow-up      How often are you checking your blood sugar? daily    What concerns do you have today about your diabetes? None     Do you have any of these symptoms? (Select all that apply)  No numbness or tingling in feet.  No redness, sores or blisters on feet.  No complaints of excessive thirst.  No reports of blurry vision.  No significant changes to weight.    BP Readings from Last 2 Encounters:   10/18/21 118/68   07/19/21 108/60     Hemoglobin A1C (%)   Date Value   08/05/2021 6.8 (H)   01/11/2021 7.9 (H)   08/24/2020 7.3 (H)     LDL Cholesterol Calculated (mg/dL)   Date Value   08/05/2021 48   08/24/2020 43   10/31/2019 59         Patient Active Problem List   Diagnosis     Prostate cancer (H):[place] 2010     Colon polyps     Chronic right shoulder pain- recurrent since 2013     Cervical spinal stenosis     Type 2 diabetes mellitus with hyperglycemia, without long-term current use of insulin (H)     Essential hypertension with goal blood pressure less than 140/90     Hyperlipidemia with target LDL less than 70     Coronary artery disease involving native coronary artery of native heart without angina pectoris     Diverticulosis of large intestine     2019 novel coronavirus disease (COVID-19)     Aortic stenosis, moderate     Current Outpatient Medications   Medication Sig Dispense Refill     aspirin 81 MG tablet Take 1 tablet (81 mg) by mouth daily 90 tablet 11     atorvastatin (LIPITOR) 20 MG tablet Take 1 tablet (20 mg) by mouth daily 90 tablet 3     blood glucose (NO BRAND SPECIFIED) test strip Use to test blood sugar 1 times daily or as directed. 100 each 11     blood glucose calibration (NO BRAND SPECIFIED) solution Use to calibrate blood glucose monitor as needed as directed. 1 each 11     blood glucose monitoring (NO BRAND SPECIFIED) meter device kit Use to test blood sugar 1 times daily or as directed. 1 kit 0     empagliflozin (JARDIANCE) 25 MG TABS tablet Take 1 tablet (25 mg) by mouth daily 90  tablet 3     glipiZIDE (GLUCOTROL XL) 5 MG 24 hr tablet Take 1 tablet (5 mg) by mouth daily 90 tablet 1     lisinopril (ZESTRIL) 10 MG tablet Take 1 tablet (10 mg) by mouth daily 90 tablet 3     metFORMIN (GLUCOPHAGE) 1000 MG tablet TAKE 1 TABLET BY MOUTH TWICE DAILY WITH MEALS 180 tablet 1     metoprolol succinate ER (TOPROL-XL) 25 MG 24 hr tablet Take 0.5 tablets (12.5 mg) by mouth daily 45 tablet 3     multivitamin, therapeutic (THERA-VIT) TABS tablet Take 1 tablet by mouth daily       nitroglycerin (NITROSTAT) 0.4 MG SL tablet Place 1 tablet (0.4 mg) under the tongue every 5 minutes as needed for chest pain 30 tablet 6     omeprazole 20 MG tablet Take 20 mg by mouth daily.       pioglitazone (ACTOS) 15 MG tablet TAKE 1 TABLET EVERY DAY 90 tablet 1     tamsulosin (FLOMAX) 0.4 MG capsule Take 1 capsule (0.4 mg) by mouth daily 90 capsule 3     thin (NO BRAND SPECIFIED) lancets Use to test blood sugar 1 times daily or as directed. 100 each 11     triamcinolone (KENALOG) 0.1 % external cream Apply topically 2 times daily as needed for irritation 80 g 1        Review of Systems   Constitutional, HEENT, cardiovascular, pulmonary, gi and gu systems are negative, except as otherwise noted.      Objective    /68 (Cuff Size: Adult Large)   Pulse 87   Temp 98.3  F (36.8  C) (Oral)   Resp 16   Wt 107 kg (236 lb)   SpO2 95%   BMI 34.85 kg/m    Body mass index is 34.85 kg/m .  Physical Exam   GENERAL: healthy, alert and no distress  NECK: no adenopathy, no asymmetry, masses, or scars and thyroid normal to palpation  RESP: lungs clear to auscultation - no rales, rhonchi or wheezes  CV: regular rate and rhythm, normal S1 S2, no S3 or S4, 3/6 systolic murmur, click or rub, no peripheral edema and peripheral pulses strong  MS: no gross musculoskeletal defects noted, no edema  SKIN: no suspicious lesions or rashes  NEURO: Normal strength and tone, mentation intact and speech normal  PSYCH: mentation appears normal,  affect normal/bright  Diabetic foot exam: normal DP and PT pulses, no trophic changes or ulcerative lesions and normal sensory exam

## 2021-11-09 ENCOUNTER — TRANSFERRED RECORDS (OUTPATIENT)
Dept: HEALTH INFORMATION MANAGEMENT | Facility: CLINIC | Age: 71
End: 2021-11-09
Payer: COMMERCIAL

## 2021-11-09 LAB — RETINOPATHY: POSITIVE

## 2021-11-17 ENCOUNTER — OFFICE VISIT (OUTPATIENT)
Dept: CARDIOLOGY | Facility: CLINIC | Age: 71
End: 2021-11-17
Payer: COMMERCIAL

## 2021-11-17 VITALS
SYSTOLIC BLOOD PRESSURE: 130 MMHG | HEART RATE: 80 BPM | DIASTOLIC BLOOD PRESSURE: 60 MMHG | HEIGHT: 69 IN | WEIGHT: 236.9 LBS | BODY MASS INDEX: 35.09 KG/M2 | OXYGEN SATURATION: 96 %

## 2021-11-17 DIAGNOSIS — E78.5 HYPERLIPIDEMIA WITH TARGET LDL LESS THAN 70: ICD-10-CM

## 2021-11-17 DIAGNOSIS — I35.0 AORTIC STENOSIS, MODERATE: ICD-10-CM

## 2021-11-17 DIAGNOSIS — R06.09 DYSPNEA ON EXERTION: Primary | ICD-10-CM

## 2021-11-17 DIAGNOSIS — I25.10 CORONARY ARTERY DISEASE INVOLVING NATIVE CORONARY ARTERY OF NATIVE HEART WITHOUT ANGINA PECTORIS: ICD-10-CM

## 2021-11-17 PROCEDURE — 93000 ELECTROCARDIOGRAM COMPLETE: CPT | Performed by: INTERNAL MEDICINE

## 2021-11-17 PROCEDURE — 99204 OFFICE O/P NEW MOD 45 MIN: CPT | Mod: 25 | Performed by: INTERNAL MEDICINE

## 2021-11-17 RX ORDER — ATORVASTATIN CALCIUM 40 MG/1
40 TABLET, FILM COATED ORAL AT BEDTIME
Qty: 90 TABLET | Refills: 3 | Status: SHIPPED | OUTPATIENT
Start: 2021-11-17 | End: 2022-10-05

## 2021-11-17 RX ORDER — NITROGLYCERIN 0.4 MG/1
TABLET SUBLINGUAL
Qty: 20 TABLET | Refills: 3 | Status: SHIPPED | OUTPATIENT
Start: 2021-11-17 | End: 2022-10-05

## 2021-11-17 RX ORDER — METOPROLOL SUCCINATE 25 MG/1
12.5 TABLET, EXTENDED RELEASE ORAL DAILY
Qty: 45 TABLET | Refills: 3 | Status: SHIPPED | OUTPATIENT
Start: 2021-11-17 | End: 2022-01-10

## 2021-11-17 ASSESSMENT — MIFFLIN-ST. JEOR: SCORE: 1809.95

## 2021-11-17 NOTE — PROGRESS NOTES
Cardiology Clinic Consultation:    November 17, 2021   Patient Name: Héctor Quispe  Patient MRN: [id number]    Consult indication: aortic stenosis    HPI:    I had the opportunity to see patient Héctor Quispe in cardiology clinic for a consultation. Patient is followed by our colleague Jesus Cherry MD with Primary Care.     As you know, patient is a pleasant 71-year-old male with a past medical history significant for coronary artery disease with NSTEMI status post PCI (BMS to RCA 1996, BMS x2 to RCA near prior stent 2002), hypertension, hyperlipidemia, diabetes, obesity, aortic stenosis, who presents for further evaluation and management of aortic stenosis.    Patient has a remote history of coronary artery disease.  In 926, he presented with unstable angina resulting in a small NSTEMI.  He underwent cardiac catheterization with bare-metal stent placement to the RCA.  In 2002, he had another NSTEMI, for which he had 2 overlapping 4 mm bare-metal stents in the RCA near the prior stent.    Since then, he had been doing reasonably well, though he does note that over the past year or so, he has had worsening shortness of breath with exertion.  He denies any chest pain or chest pressure.  He denies any symptoms of orthopnea/PND or abnormal lower extremity swelling.    He was seen by his primary care physician, Dr. Cherry, and on exam was noted to have a cardiac murmur.  Patient underwent a TTE on 8/6/2021 that demonstrated normal biventricular function, moderate aortic stenosis, Vmax 2.4 m/s, mean gradient 13 mmHg, ARMANDO 1.4cm2, DI 0.38. SVi 35 cc/m2.    Last cholesterol labs from 8/5/2021 are notable for total cholesterol 150, HDL 69, LDL 48, triglycerides 165.    ECG today in clinic demonstrates normal sinus rhythm at 78 bpm, borderline DE interval 190 ms, incomplete right bundle branch block, nonspecific ST segment changes.   ms.    Assessment and Plan/Recommendations:    # Dyspnea on exertion. Symptoms  are atypical for angina, however could be an anginal equivalent.  He has known coronary artery disease with prior NSTEMI in 1996 in 2002 with bare-metal stents to the RCA in both instances.  Overall clinical presentation does not seem consistent with unstable angina or decompensated heart failure.  # CAD, as above.  # Moderate aortic stenosis, degenerative/non-rheumatic, Vmax 2.4 m/s, mean gradient 13 mmHg, ARMANDO 1.4cm2, DI 0.38. SVi 35 cc/m2.  # HTN  # HL, elevated 10 year ASCVD risk  # DM2  # Obesity    -Reviewed diagnosis and natural course of aortic stenosis.  -Discussed options for further evaluation and management of dyspnea on exertion.  Unfortunately patient is unable to exercise due to hip pain/arthritis.  Nuclear perfusion stress testing would be suboptimal given body habitus/obesity and prior stenting to the RCA, evaluation of the inferior wall would be important.  Exercise stress echocardiogram would also be suboptimal given body habitus and inability to exercise.  Will proceed with a cardiac stress MRI.  He has had an MRI before and did not have complications.  No allergies to contrast.  -Will increase atorvastatin 20 mg nightly to 40 mg nightly  -Continue aspirin, metoprolol, lisinopril  -Ordered sublingual nitroglycerin as needed  -Fasting lipids in 3 months  -TTE in 1 year with follow-up at that time, or sooner as needed    Thank you for allowing our team to participate in the care of Héctor Quispe.  Please do not hesitate to call or page me with any questions or concerns.    Sincerely,     Zac Cabrera MD, [facility]  Cardiology  Text Page   November 17, 2021      Jesus Cherry MD  [address] [place] [address]    Voice recognition software utilized. Although reviewed after completion, some word and grammatical errors may be present.    Total time spent on this encounter: 50 minutes, providing care in this encounter including, but not limited to, reviewing prior  medical records, laboratory data, imaging studies, diagnostic studies, procedure notes, formulating an assessment and plan, recommendations.    Past Medical History:     The 10-year ASCVD risk score (Itta Ben[place] ALEN Jr., et al., 2013) is: 36.2%    Values used to calculate the score:      Age: 71 years      Sex: Male      Is Non- : No      Diabetic: Yes      Tobacco smoker: No      Systolic Blood Pressure: 130 mmHg      Is BP treated: Yes      HDL Cholesterol: 69 mg/dL      Total Cholesterol: 150 mg/dL  Patient Active Problem List   Diagnosis     Prostate cancer (H):[place] 2010     Colon polyps     Chronic right shoulder pain- recurrent since 2013     Cervical spinal stenosis     Type 2 diabetes mellitus with hyperglycemia, without long-term current use of insulin (H)     Essential hypertension with goal blood pressure less than 140/90     Hyperlipidemia with target LDL less than 70     Coronary artery disease involving native coronary artery of native heart without angina pectoris     Diverticulosis of large intestine     2019 novel coronavirus disease (COVID-19)     Aortic stenosis, moderate       Past Surgical History:   Past Surgical History:   Procedure Laterality Date     BRACHYTHERAPY RADIOELEMENTS  2011    brachytherapy for prostate ca     CYSTOSCOPY       ENT SURGERY       STENT, CORONARY, LUIS M      h/o coronary stent x 2       Medications (outpatient):  Current Outpatient Medications   Medication Sig Dispense Refill     aspirin 81 MG tablet Take 1 tablet (81 mg) by mouth daily 90 tablet 11     atorvastatin (LIPITOR) 20 MG tablet Take 1 tablet (20 mg) by mouth daily 90 tablet 3     blood glucose (NO BRAND SPECIFIED) test strip Use to test blood sugar 1 times daily or as directed. 100 each 11     blood glucose calibration (NO BRAND SPECIFIED) solution Use to calibrate blood glucose monitor as needed as directed. 1 each 11     blood glucose monitoring (NO BRAND SPECIFIED) meter device kit Use to  test blood sugar 1 times daily or as directed. 1 kit 0     empagliflozin (JARDIANCE) 25 MG TABS tablet Take 1 tablet (25 mg) by mouth daily 90 tablet 3     glipiZIDE (GLUCOTROL XL) 5 MG 24 hr tablet Take 1 tablet (5 mg) by mouth daily 90 tablet 1     lisinopril (ZESTRIL) 10 MG tablet Take 1 tablet (10 mg) by mouth daily 90 tablet 3     metFORMIN (GLUCOPHAGE) 1000 MG tablet TAKE 1 TABLET BY MOUTH TWICE DAILY WITH MEALS 180 tablet 1     metoprolol succinate ER (TOPROL-XL) 25 MG 24 hr tablet Take 0.5 tablets (12.5 mg) by mouth daily 45 tablet 3     multivitamin, therapeutic (THERA-VIT) TABS tablet Take 1 tablet by mouth daily       nitroglycerin (NITROSTAT) 0.4 MG SL tablet Place 1 tablet (0.4 mg) under the tongue every 5 minutes as needed for chest pain 30 tablet 6     omeprazole 20 MG tablet Take 20 mg by mouth daily.       pioglitazone (ACTOS) 15 MG tablet TAKE 1 TABLET EVERY DAY 90 tablet 1     tamsulosin (FLOMAX) 0.4 MG capsule Take 1 capsule (0.4 mg) by mouth daily 90 capsule 3     thin (NO BRAND SPECIFIED) lancets Use to test blood sugar 1 times daily or as directed. 100 each 11     triamcinolone (KENALOG) 0.1 % external cream Apply topically 2 times daily as needed for irritation 80 g 1       Allergies:  No Known Allergies    Social History:   History   Drug Use No      History   Smoking Status     Former Smoker     Types: Cigarettes     Quit date: 8/2/2002   Smokeless Tobacco     Never Used     Social History    Substance and Sexual Activity      Alcohol use: Yes        Alcohol/week: 3.0 standard drinks        Types: 3 Cans of beer per week       Family History:  Family History   Problem Relation Age of Onset     Alzheimer Disease Mother      Cancer Father      Diabetes No family hx of      Coronary Artery Disease No family hx of      Hypertension No family hx of      Hyperlipidemia No family hx of      Cerebrovascular Disease No family hx of      Breast Cancer No family hx of      Colon Cancer No family hx of  "     Prostate Cancer No family hx of      Other Cancer No family hx of      Depression No family hx of      Anxiety Disorder No family hx of      Mental Illness No family hx of      Substance Abuse No family hx of      Anesthesia Reaction No family hx of      Asthma No family hx of      Osteoporosis No family hx of      Genetic Disorder No family hx of      Thyroid Disease No family hx of      Obesity No family hx of      Unknown/Adopted No family hx of        Review of Systems:   A complete review of systems was negative except as mentioned in the History of Present Illness.     Objective & Physical Exam:  /60 (BP Location: Right arm, Patient Position: Chair, Cuff Size: Adult Large)   Pulse 80   Ht 1.753 m (5' 9\")   Wt 107.5 kg (236 lb 14.4 oz)   SpO2 96%   BMI 34.98 kg/m    Wt Readings from Last 2 Encounters:   11/17/21 107.5 kg (236 lb 14.4 oz)   10/18/21 107 kg (236 lb)     Body mass index is 34.98 kg/m .   Body surface area is 2.29 meters squared.    Constitutional: appears stated age, in no apparent distress, appears to be well nourished  Eyes: sclera anicteric, conjunctiva normal  ENT: normocephalic, without obvious abnormality, atraumatic  Pulmonary: clear to auscultation bilaterally, no wheezes, no rales, no increased work of breathing  Cardiovascular: JVP normal, regular rate, regular rhythm, 2/6 DEBBIE at the RUSB, no lower extremity edema  Gastrointestinal: abdominal exam benign  Neurologic: awake, alert, face symmetrical, moves all extremities  Skin: no jaundice  Psychiatric: affect is normal, answers questions appropriately, oriented to self and place    Data reviewed:  Lab Results   Component Value Date    WBC 4.0 05/06/2015    RBC 4.04 (L) 05/06/2015    HGB 12.9 (L) 05/06/2015    HCT 39.3 (L) 05/06/2015    MCV 97 05/06/2015    MCH 31.9 05/06/2015    MCHC 32.8 05/06/2015    RDW 13.2 05/06/2015     05/06/2015     Sodium   Date Value Ref Range Status   08/05/2021 135 133 - 144 mmol/L " Final   08/24/2020 138 133 - 144 mmol/L Final     Potassium   Date Value Ref Range Status   08/05/2021 4.6 3.4 - 5.3 mmol/L Final   08/24/2020 4.1 3.4 - 5.3 mmol/L Final     Chloride   Date Value Ref Range Status   08/05/2021 103 94 - 109 mmol/L Final   08/24/2020 105 94 - 109 mmol/L Final     Carbon Dioxide   Date Value Ref Range Status   08/24/2020 26 20 - 32 mmol/L Final     Carbon Dioxide (CO2)   Date Value Ref Range Status   08/05/2021 25 20 - 32 mmol/L Final     Anion Gap   Date Value Ref Range Status   08/05/2021 7 3 - 14 mmol/L Final   08/24/2020 7 3 - 14 mmol/L Final     Glucose   Date Value Ref Range Status   08/05/2021 140 (H) 70 - 99 mg/dL Final   08/24/2020 146 (H) 70 - 99 mg/dL Final     Comment:     Fasting specimen     Urea Nitrogen   Date Value Ref Range Status   08/05/2021 23 7 - 30 mg/dL Final   08/24/2020 24 7 - 30 mg/dL Final     Creatinine   Date Value Ref Range Status   08/05/2021 1.05 0.66 - 1.25 mg/dL Final   08/24/2020 0.95 0.66 - 1.25 mg/dL Final     GFR Estimate   Date Value Ref Range Status   08/05/2021 70 >60 mL/min/1.73m2 Final     Comment:     As of July 11, 2021, eGFR is calculated by the CKD-EPI creatinine equation, without race adjustment. eGFR can be influenced by muscle mass, exercise, and diet. The reported eGFR is an estimation only and is only applicable if the renal function is stable.   08/24/2020 79 >60 mL/min/[1.73_m2] Final     Comment:     Non  GFR Calc  Starting 12/18/2018, serum creatinine based estimated GFR (eGFR) will be   calculated using the Chronic Kidney Disease Epidemiology Collaboration   (CKD-EPI) equation.       Calcium   Date Value Ref Range Status   08/05/2021 9.3 8.5 - 10.1 mg/dL Final   08/24/2020 8.5 8.5 - 10.1 mg/dL Final     Bilirubin Total   Date Value Ref Range Status   08/05/2021 0.5 0.2 - 1.3 mg/dL Final   08/24/2020 0.4 0.2 - 1.3 mg/dL Final     Alkaline Phosphatase   Date Value Ref Range Status   08/05/2021 59 40 - 150 U/L Final    2020 69 40 - 150 U/L Final     ALT   Date Value Ref Range Status   2021 26 0 - 70 U/L Final   2020 30 0 - 70 U/L Final     AST   Date Value Ref Range Status   2021 14 0 - 45 U/L Final   2020 15 0 - 45 U/L Final     Recent Labs   Lab Test 21  0800 20  0755 16  1033 05/06/15  0908   CHOL 150 138   < > 131   HDL 69 57   < > 71   LDL 48 43   < > 50   TRIG 165* 189*   < > 50   CHOLHDLRATIO  --   --   --  1.8    < > = values in this interval not displayed.      Lab Results   Component Value Date    A1C 6.8 2021    A1C 7.9 2021    A1C 7.3 2020    A1C 8.4 10/31/2019    A1C 7.5 2019    A1C 7.2 2018        Recent Results (from the past 4320 hour(s))   Echocardiogram Complete   Result Value    LVEF  60-65%    Narrative    [id number]  [id number]  [id number]  [id number]^DISHA^DESHAWN^LISSET     [facility]  Echocardiography Laboratory  [address] [place] [address]     Name: LUDMILA CODY  MRN: [id number]  : 1950  Study Date: 2021 09:45 AM  Age: 71 yrs  Gender: Male  Patient Location: [facility]  Reason For Study: Heart murmur  Ordering Physician: DESHAWN GAUTHIER  Referring Physician: DESHAWN GAUTHIER  Performed By: Jessica Muse RDCS     BSA: 2.2 m2  Height: 69 in  Weight: 230 lb  HR: 77  BP: 150/77 mmHg  ______________________________________________________________________________  Procedure  Complete Echo Adult. Optison (NDC #[id number]) given intravenously.  ______________________________________________________________________________  Interpretation Summary     Left ventricular size, global systolic function, and wall motion are normal,  estimated LVEF 60-65%.  Right ventricular global function is normal.  Moderate aortic stenosis, Vmax 2.4 m/s, mean gradient 13 mmHg, ARMANDO 1.4cm2, DI  0.38. SVi 35 cc/m2.  IVC diameter and respiratory changes fall into an intermediate range  suggesting an RA pressure of 8  mmHg.     There are no prior studies available for comparison.  ______________________________________________________________________________  Left Ventricle  The left ventricle is normal in size. There is normal left ventricular wall  thickness. Left ventricular systolic function is normal. The visual ejection  fraction is 60-65%. Left ventricular diastolic function is normal. No regional  wall motion abnormalities noted.     Right Ventricle  The right ventricle is normal in structure, function and size.     Atria  Normal left atrial size.     Mitral Valve  The mitral valve is normal in structure and function.     Tricuspid Valve  There is trace tricuspid regurgitation. Right ventricle systolic pressure  estimate normal. The right ventricular systolic pressure is approximated at  16.2 mmHg plus the right atrial pressure.     Aortic Valve  The aortic valve is trileaflet. Moderate aortic stenosis, Vmax 2.4 m/s, mean  gradient 13 mmHg, ARMANDO 1.4cm2, DI 0.38. SVi 35 cc/m2.     Pulmonic Valve  The pulmonic valve is not well seen, but is grossly normal.     Vessels  The aortic root is normal size. Normal size ascending aorta. IVC diameter and  respiratory changes fall into an intermediate range suggesting an RA pressure  of 8 mmHg.     Pericardium  There is no pericardial effusion.     ______________________________________________________________________________  MMode/2D Measurements & Calculations  IVSd: 1.1 cm  LVIDd: 4.9 cm  LVIDs: 2.9 cm  LVPWd: 1.1 cm  IVC diam: 2.0 cm  FS: 40.1 %  LV mass(C)d: 193.8 grams  LV mass(C)dI: 88.4 grams/m2     Ao root diam: 3.5 cm  LA dimension: 4.5 cm  asc Aorta Diam: 3.1 cm  LA/Ao: 1.3  LVOT diam: 2.1 cm  LVOT area: 3.6 cm2  LA Volume Index (BP): 22.8 ml/m2  RWT: 0.46     Doppler Measurements & Calculations  MV E max bebeto: 89.8 cm/sec  MV A max bebeto: 84.2 cm/sec  MV E/A: 1.1  MV max P.2 mmHg  MV mean P.9 mmHg  MV V2 VTI: 28.0 cm  MVA(VTI): 2.8 cm2  MV dec slope: 413.6  cm/sec2  MV dec time: 0.22 sec     Ao V2 max: 239.5 cm/sec  Ao max P.0 mmHg  Ao V2 mean: 167.2 cm/sec  Ao mean P.9 mmHg  Ao V2 VTI: 54.4 cm  ARMANDO(I,D): 1.4 cm2  ARMANDO(V,D): 1.4 cm2  LV V1 max PG: 3.4 mmHg  LV V1 max: 91.9 cm/sec  LV V1 VTI: 21.5 cm  SV(LVOT): 77.4 ml  SI(LVOT): 35.3 ml/m2  PA acc time: 0.04 sec  TR max jorge luis: 201.1 cm/sec  TR max P.2 mmHg  AV Jorge Luis Ratio (DI): 0.38  ARMANDO Index (cm2/m2): 0.65  E/E': 13.1  Peak E' Jorge Luis: 6.8 cm/sec     ______________________________________________________________________________  Report approved by: Marisol Goodson 2021 11:35 AM

## 2021-11-17 NOTE — LETTER
11/17/2021    Jesus Cherry MD  [address] [place] [address]    RE: Héctor PEREZ Charlesrolfosman       Dear Colleague,    I had the pleasure of seeing Héctor [place] in the [facility].        Cardiology Clinic Consultation:    November 17, 2021   Patient Name: Héctor [place]  Patient MRN: [id number]    Consult indication: aortic stenosis    HPI:    I had the opportunity to see patient Héctor [place] in cardiology clinic for a consultation. Patient is followed by our colleague Jesus Cherry MD with Primary Care.     As you know, patient is a pleasant 71-year-old male with a past medical history significant for coronary artery disease with NSTEMI status post PCI (BMS to RCA 1996, BMS x2 to RCA near prior stent 2002), hypertension, hyperlipidemia, diabetes, obesity, aortic stenosis, who presents for further evaluation and management of aortic stenosis.    Patient has a remote history of coronary artery disease.  In 926, he presented with unstable angina resulting in a small NSTEMI.  He underwent cardiac catheterization with bare-metal stent placement to the RCA.  In 2002, he had another NSTEMI, for which he had 2 overlapping 4 mm bare-metal stents in the RCA near the prior stent.    Since then, he had been doing reasonably well, though he does note that over the past year or so, he has had worsening shortness of breath with exertion.  He denies any chest pain or chest pressure.  He denies any symptoms of orthopnea/PND or abnormal lower extremity swelling.    He was seen by his primary care physician, Dr. Cherry, and on exam was noted to have a cardiac murmur.  Patient underwent a TTE on 8/6/2021 that demonstrated normal biventricular function, moderate aortic stenosis, Vmax 2.4 m/s, mean gradient 13 mmHg, ARMANDO 1.4cm2, DI 0.38. SVi 35 cc/m2.    Last cholesterol labs from 8/5/2021 are notable for total cholesterol 150, HDL 69, LDL 48, triglycerides 165.    ECG  today in clinic demonstrates normal sinus rhythm at 78 bpm, borderline SC interval 190 ms, incomplete right bundle branch block, nonspecific ST segment changes.   ms.    Assessment and Plan/Recommendations:    # Dyspnea on exertion. Symptoms are atypical for angina, however could be an anginal equivalent.  He has known coronary artery disease with prior NSTEMI in 1996 in 2002 with bare-metal stents to the RCA in both instances.  Overall clinical presentation does not seem consistent with unstable angina or decompensated heart failure.  # CAD, as above.  # Moderate aortic stenosis, degenerative/non-rheumatic, Vmax 2.4 m/s, mean gradient 13 mmHg, ARMANDO 1.4cm2, DI 0.38. SVi 35 cc/m2.  # HTN  # HL, elevated 10 year ASCVD risk  # DM2  # Obesity    -Reviewed diagnosis and natural course of aortic stenosis.  -Discussed options for further evaluation and management of dyspnea on exertion.  Unfortunately patient is unable to exercise due to hip pain/arthritis.  Nuclear perfusion stress testing would be suboptimal given body habitus/obesity and prior stenting to the RCA, evaluation of the inferior wall would be important.  Exercise stress echocardiogram would also be suboptimal given body habitus and inability to exercise.  Will proceed with a cardiac stress MRI.  He has had an MRI before and did not have complications.  No allergies to contrast.  -Will increase atorvastatin 20 mg nightly to 40 mg nightly  -Continue aspirin, metoprolol, lisinopril  -Ordered sublingual nitroglycerin as needed  -Fasting lipids in 3 months  -TTE in 1 year with follow-up at that time, or sooner as needed    Thank you for allowing our team to participate in the care of Héctor [place].  Please do not hesitate to call or page me with any questions or concerns.    Sincerely,     Zac Cabrera MD, [facility]  Cardiology  Text Page   November 17, 2021      Jesus Cherry MD  [address] [place] [address]    Voice  recognition software utilized. Although reviewed after completion, some word and grammatical errors may be present.    Total time spent on this encounter: 50 minutes, providing care in this encounter including, but not limited to, reviewing prior medical records, laboratory data, imaging studies, diagnostic studies, procedure notes, formulating an assessment and plan, recommendations.    Past Medical History:     The 10-year ASCVD risk score (Alejandro LOWRY Jr., et al., 2013) is: 36.2%    Values used to calculate the score:      Age: 71 years      Sex: Male      Is Non- : No      Diabetic: Yes      Tobacco smoker: No      Systolic Blood Pressure: 130 mmHg      Is BP treated: Yes      HDL Cholesterol: 69 mg/dL      Total Cholesterol: 150 mg/dL  Patient Active Problem List   Diagnosis     Prostate cancer (H):[place] 2010     Colon polyps     Chronic right shoulder pain- recurrent since 2013     Cervical spinal stenosis     Type 2 diabetes mellitus with hyperglycemia, without long-term current use of insulin (H)     Essential hypertension with goal blood pressure less than 140/90     Hyperlipidemia with target LDL less than 70     Coronary artery disease involving native coronary artery of native heart without angina pectoris     Diverticulosis of large intestine     2019 novel coronavirus disease (COVID-19)     Aortic stenosis, moderate       Past Surgical History:   Past Surgical History:   Procedure Laterality Date     BRACHYTHERAPY RADIOELEMENTS  2011    brachytherapy for prostate ca     CYSTOSCOPY       ENT SURGERY       STENT, CORONARY, LUIS M      h/o coronary stent x 2       Medications (outpatient):  Current Outpatient Medications   Medication Sig Dispense Refill     aspirin 81 MG tablet Take 1 tablet (81 mg) by mouth daily 90 tablet 11     atorvastatin (LIPITOR) 20 MG tablet Take 1 tablet (20 mg) by mouth daily 90 tablet 3     blood glucose (NO BRAND SPECIFIED) test strip Use to test blood sugar 1  times daily or as directed. 100 each 11     blood glucose calibration (NO BRAND SPECIFIED) solution Use to calibrate blood glucose monitor as needed as directed. 1 each 11     blood glucose monitoring (NO BRAND SPECIFIED) meter device kit Use to test blood sugar 1 times daily or as directed. 1 kit 0     empagliflozin (JARDIANCE) 25 MG TABS tablet Take 1 tablet (25 mg) by mouth daily 90 tablet 3     glipiZIDE (GLUCOTROL XL) 5 MG 24 hr tablet Take 1 tablet (5 mg) by mouth daily 90 tablet 1     lisinopril (ZESTRIL) 10 MG tablet Take 1 tablet (10 mg) by mouth daily 90 tablet 3     metFORMIN (GLUCOPHAGE) 1000 MG tablet TAKE 1 TABLET BY MOUTH TWICE DAILY WITH MEALS 180 tablet 1     metoprolol succinate ER (TOPROL-XL) 25 MG 24 hr tablet Take 0.5 tablets (12.5 mg) by mouth daily 45 tablet 3     multivitamin, therapeutic (THERA-VIT) TABS tablet Take 1 tablet by mouth daily       nitroglycerin (NITROSTAT) 0.4 MG SL tablet Place 1 tablet (0.4 mg) under the tongue every 5 minutes as needed for chest pain 30 tablet 6     omeprazole 20 MG tablet Take 20 mg by mouth daily.       pioglitazone (ACTOS) 15 MG tablet TAKE 1 TABLET EVERY DAY 90 tablet 1     tamsulosin (FLOMAX) 0.4 MG capsule Take 1 capsule (0.4 mg) by mouth daily 90 capsule 3     thin (NO BRAND SPECIFIED) lancets Use to test blood sugar 1 times daily or as directed. 100 each 11     triamcinolone (KENALOG) 0.1 % external cream Apply topically 2 times daily as needed for irritation 80 g 1       Allergies:  No Known Allergies    Social History:   History   Drug Use No      History   Smoking Status     Former Smoker     Types: Cigarettes     Quit date: 8/2/2002   Smokeless Tobacco     Never Used     Social History    Substance and Sexual Activity      Alcohol use: Yes        Alcohol/week: 3.0 standard drinks        Types: 3 Cans of beer per week       Family History:  Family History   Problem Relation Age of Onset     Alzheimer Disease Mother      Cancer Father       "Diabetes No family hx of      Coronary Artery Disease No family hx of      Hypertension No family hx of      Hyperlipidemia No family hx of      Cerebrovascular Disease No family hx of      Breast Cancer No family hx of      Colon Cancer No family hx of      Prostate Cancer No family hx of      Other Cancer No family hx of      Depression No family hx of      Anxiety Disorder No family hx of      Mental Illness No family hx of      Substance Abuse No family hx of      Anesthesia Reaction No family hx of      Asthma No family hx of      Osteoporosis No family hx of      Genetic Disorder No family hx of      Thyroid Disease No family hx of      Obesity No family hx of      Unknown/Adopted No family hx of        Review of Systems:   A complete review of systems was negative except as mentioned in the History of Present Illness.     Objective & Physical Exam:  /60 (BP Location: Right arm, Patient Position: Chair, Cuff Size: Adult Large)   Pulse 80   Ht 1.753 m (5' 9\")   Wt 107.5 kg (236 lb 14.4 oz)   SpO2 96%   BMI 34.98 kg/m    Wt Readings from Last 2 Encounters:   11/17/21 107.5 kg (236 lb 14.4 oz)   10/18/21 107 kg (236 lb)     Body mass index is 34.98 kg/m .   Body surface area is 2.29 meters squared.    Constitutional: appears stated age, in no apparent distress, appears to be well nourished  Eyes: sclera anicteric, conjunctiva normal  ENT: normocephalic, without obvious abnormality, atraumatic  Pulmonary: clear to auscultation bilaterally, no wheezes, no rales, no increased work of breathing  Cardiovascular: JVP normal, regular rate, regular rhythm, 2/6 DEBBIE at the RUSB, no lower extremity edema  Gastrointestinal: abdominal exam benign  Neurologic: awake, alert, face symmetrical, moves all extremities  Skin: no jaundice  Psychiatric: affect is normal, answers questions appropriately, oriented to self and place    Data reviewed:  Lab Results   Component Value Date    WBC 4.0 05/06/2015    RBC 4.04 (L) " 05/06/2015    HGB 12.9 (L) 05/06/2015    HCT 39.3 (L) 05/06/2015    MCV 97 05/06/2015    MCH 31.9 05/06/2015    MCHC 32.8 05/06/2015    RDW 13.2 05/06/2015     05/06/2015     Sodium   Date Value Ref Range Status   08/05/2021 135 133 - 144 mmol/L Final   08/24/2020 138 133 - 144 mmol/L Final     Potassium   Date Value Ref Range Status   08/05/2021 4.6 3.4 - 5.3 mmol/L Final   08/24/2020 4.1 3.4 - 5.3 mmol/L Final     Chloride   Date Value Ref Range Status   08/05/2021 103 94 - 109 mmol/L Final   08/24/2020 105 94 - 109 mmol/L Final     Carbon Dioxide   Date Value Ref Range Status   08/24/2020 26 20 - 32 mmol/L Final     Carbon Dioxide (CO2)   Date Value Ref Range Status   08/05/2021 25 20 - 32 mmol/L Final     Anion Gap   Date Value Ref Range Status   08/05/2021 7 3 - 14 mmol/L Final   08/24/2020 7 3 - 14 mmol/L Final     Glucose   Date Value Ref Range Status   08/05/2021 140 (H) 70 - 99 mg/dL Final   08/24/2020 146 (H) 70 - 99 mg/dL Final     Comment:     Fasting specimen     Urea Nitrogen   Date Value Ref Range Status   08/05/2021 23 7 - 30 mg/dL Final   08/24/2020 24 7 - 30 mg/dL Final     Creatinine   Date Value Ref Range Status   08/05/2021 1.05 0.66 - 1.25 mg/dL Final   08/24/2020 0.95 0.66 - 1.25 mg/dL Final     GFR Estimate   Date Value Ref Range Status   08/05/2021 70 >60 mL/min/1.73m2 Final     Comment:     As of July 11, 2021, eGFR is calculated by the CKD-EPI creatinine equation, without race adjustment. eGFR can be influenced by muscle mass, exercise, and diet. The reported eGFR is an estimation only and is only applicable if the renal function is stable.   08/24/2020 79 >60 mL/min/[1.73_m2] Final     Comment:     Non  GFR Calc  Starting 12/18/2018, serum creatinine based estimated GFR (eGFR) will be   calculated using the Chronic Kidney Disease Epidemiology Collaboration   (CKD-EPI) equation.       Calcium   Date Value Ref Range Status   08/05/2021 9.3 8.5 - 10.1 mg/dL Final    2020 8.5 8.5 - 10.1 mg/dL Final     Bilirubin Total   Date Value Ref Range Status   2021 0.5 0.2 - 1.3 mg/dL Final   2020 0.4 0.2 - 1.3 mg/dL Final     Alkaline Phosphatase   Date Value Ref Range Status   2021 59 40 - 150 U/L Final   2020 69 40 - 150 U/L Final     ALT   Date Value Ref Range Status   2021 26 0 - 70 U/L Final   2020 30 0 - 70 U/L Final     AST   Date Value Ref Range Status   2021 14 0 - 45 U/L Final   2020 15 0 - 45 U/L Final     Recent Labs   Lab Test 21  0800 20  0755 16  1033 05/06/15  0908   CHOL 150 138   < > 131   HDL 69 57   < > 71   LDL 48 43   < > 50   TRIG 165* 189*   < > 50   CHOLHDLRATIO  --   --   --  1.8    < > = values in this interval not displayed.      Lab Results   Component Value Date    A1C 6.8 2021    A1C 7.9 2021    A1C 7.3 2020    A1C 8.4 10/31/2019    A1C 7.5 2019    A1C 7.2 2018        Recent Results (from the past 4320 hour(s))   Echocardiogram Complete   Result Value    LVEF  60-65%    Narrative    [id number]  [id number]  [id number]  [id number]^DISHA^DESHAWN^LISSET     [facility]  Echocardiography Laboratory  [address] [place] [address]     Name: LUDMILA CODY  MRN: [id number]  : 1950  Study Date: 2021 09:45 AM  Age: 71 yrs  Gender: Male  Patient Location: [facility]  Reason For Study: Heart murmur  Ordering Physician: DESHAWN GAUTHIER  Referring Physician: DESHAWN GAUTHIER  Performed By: Jessica Muse RDCS     BSA: 2.2 m2  Height: 69 in  Weight: 230 lb  HR: 77  BP: 150/77 mmHg  ______________________________________________________________________________  Procedure  Complete Echo Adult. Optison (NDC #[id number]) given intravenously.  ______________________________________________________________________________  Interpretation Summary     Left ventricular size, global systolic function, and wall motion are normal,  estimated LVEF  60-65%.  Right ventricular global function is normal.  Moderate aortic stenosis, Vmax 2.4 m/s, mean gradient 13 mmHg, ARMANDO 1.4cm2, DI  0.38. SVi 35 cc/m2.  IVC diameter and respiratory changes fall into an intermediate range  suggesting an RA pressure of 8 mmHg.     There are no prior studies available for comparison.  ______________________________________________________________________________  Left Ventricle  The left ventricle is normal in size. There is normal left ventricular wall  thickness. Left ventricular systolic function is normal. The visual ejection  fraction is 60-65%. Left ventricular diastolic function is normal. No regional  wall motion abnormalities noted.     Right Ventricle  The right ventricle is normal in structure, function and size.     Atria  Normal left atrial size.     Mitral Valve  The mitral valve is normal in structure and function.     Tricuspid Valve  There is trace tricuspid regurgitation. Right ventricle systolic pressure  estimate normal. The right ventricular systolic pressure is approximated at  16.2 mmHg plus the right atrial pressure.     Aortic Valve  The aortic valve is trileaflet. Moderate aortic stenosis, Vmax 2.4 m/s, mean  gradient 13 mmHg, ARMANDO 1.4cm2, DI 0.38. SVi 35 cc/m2.     Pulmonic Valve  The pulmonic valve is not well seen, but is grossly normal.     Vessels  The aortic root is normal size. Normal size ascending aorta. IVC diameter and  respiratory changes fall into an intermediate range suggesting an RA pressure  of 8 mmHg.     Pericardium  There is no pericardial effusion.     ______________________________________________________________________________  MMode/2D Measurements & Calculations  IVSd: 1.1 cm  LVIDd: 4.9 cm  LVIDs: 2.9 cm  LVPWd: 1.1 cm  IVC diam: 2.0 cm  FS: 40.1 %  LV mass(C)d: 193.8 grams  LV mass(C)dI: 88.4 grams/m2     Ao root diam: 3.5 cm  LA dimension: 4.5 cm  asc Aorta Diam: 3.1 cm  LA/Ao: 1.3  LVOT diam: 2.1 cm  LVOT area: 3.6 cm2  LA  Volume Index (BP): 22.8 ml/m2  RWT: 0.46     Doppler Measurements & Calculations  MV E max jorge luis: 89.8 cm/sec  MV A max jorge luis: 84.2 cm/sec  MV E/A: 1.1  MV max P.2 mmHg  MV mean P.9 mmHg  MV V2 VTI: 28.0 cm  MVA(VTI): 2.8 cm2  MV dec slope: 413.6 cm/sec2  MV dec time: 0.22 sec     Ao V2 max: 239.5 cm/sec  Ao max P.0 mmHg  Ao V2 mean: 167.2 cm/sec  Ao mean P.9 mmHg  Ao V2 VTI: 54.4 cm  ARMANDO(I,D): 1.4 cm2  ARMANDO(V,D): 1.4 cm2  LV V1 max PG: 3.4 mmHg  LV V1 max: 91.9 cm/sec  LV V1 VTI: 21.5 cm  SV(LVOT): 77.4 ml  SI(LVOT): 35.3 ml/m2  PA acc time: 0.04 sec  TR max jorge luis: 201.1 cm/sec  TR max P.2 mmHg  AV Jorge Luis Ratio (DI): 0.38  ARMANDO Index (cm2/m2): 0.65  E/E': 13.1  Peak E' Jorge Luis: 6.8 cm/sec     ______________________________________________________________________________  Report approved by: Marisol Goodson 2021 11:35 AM                  Thank you for allowing me to participate in the care of your patient.      Sincerely,     Zac Cabrera MD     [facility]  cc:   Jesus Cherry MD  [address] [address]

## 2021-11-17 NOTE — PATIENT INSTRUCTIONS
November 17, 2021    Thank you for allowing our Cardiology team to participate in your care.     Please note the following changes to your heart treatment plan:     Medication changes:   - increase atorvastatin 20mg at bedtime to 40mg at bedtime     Tests to be done:  - cardiac MRI stress test  - TTE (heart ultrasound) in 1 year  - FASTING cholesterol labs in 3 months    Follow up:  - Follow up in 1 year, or sooner as needed.      Please contact our team at [phone] or [phone] for any questions or concerns.   For scheduling, please call [phone].  If you are having a medical emergency, please call [phone].     Sincerely,    Zac Cabrera MD, FACC  Cardiology    [facility] - [facility] - [facility]

## 2021-12-07 ENCOUNTER — HOSPITAL ENCOUNTER (OUTPATIENT)
Dept: CARDIOLOGY | Facility: CLINIC | Age: 71
Discharge: HOME | End: 2021-12-07
Attending: INTERNAL MEDICINE | Admitting: INTERNAL MEDICINE
Payer: COMMERCIAL

## 2021-12-07 VITALS — DIASTOLIC BLOOD PRESSURE: 55 MMHG | HEART RATE: 120 BPM | SYSTOLIC BLOOD PRESSURE: 153 MMHG

## 2021-12-07 DIAGNOSIS — R06.09 DYSPNEA ON EXERTION: ICD-10-CM

## 2021-12-07 PROCEDURE — A9585 GADOBUTROL INJECTION: HCPCS | Performed by: INTERNAL MEDICINE

## 2021-12-07 PROCEDURE — 75563 CARD MRI W/STRESS IMG & DYE: CPT | Mod: 26 | Performed by: INTERNAL MEDICINE

## 2021-12-07 PROCEDURE — 75563 CARD MRI W/STRESS IMG & DYE: CPT

## 2021-12-07 PROCEDURE — 250N000011 HC RX IP 250 OP 636: Performed by: INTERNAL MEDICINE

## 2021-12-07 PROCEDURE — 255N000002 HC RX 255 OP 636: Performed by: INTERNAL MEDICINE

## 2021-12-07 PROCEDURE — 93016 CV STRESS TEST SUPVJ ONLY: CPT | Performed by: INTERNAL MEDICINE

## 2021-12-07 PROCEDURE — 93018 CV STRESS TEST I&R ONLY: CPT | Performed by: INTERNAL MEDICINE

## 2021-12-07 RX ORDER — ALBUTEROL SULFATE 90 UG/1
2 AEROSOL, METERED RESPIRATORY (INHALATION) EVERY 5 MIN PRN
Status: DISCONTINUED | OUTPATIENT
Start: 2021-12-07 | End: 2021-12-08 | Stop reason: HOSPADM

## 2021-12-07 RX ORDER — CAFFEINE CITRATE 20 MG/ML
60 SOLUTION INTRAVENOUS
Status: DISCONTINUED | OUTPATIENT
Start: 2021-12-07 | End: 2021-12-08 | Stop reason: HOSPADM

## 2021-12-07 RX ORDER — GADOBUTROL 604.72 MG/ML
28 INJECTION INTRAVENOUS ONCE
Status: COMPLETED | OUTPATIENT
Start: 2021-12-07 | End: 2021-12-07

## 2021-12-07 RX ORDER — DIAZEPAM 5 MG
5 TABLET ORAL EVERY 30 MIN PRN
Status: DISCONTINUED | OUTPATIENT
Start: 2021-12-07 | End: 2021-12-08 | Stop reason: HOSPADM

## 2021-12-07 RX ORDER — ONDANSETRON 2 MG/ML
4 INJECTION INTRAMUSCULAR; INTRAVENOUS
Status: DISCONTINUED | OUTPATIENT
Start: 2021-12-07 | End: 2021-12-08 | Stop reason: HOSPADM

## 2021-12-07 RX ORDER — DIPHENHYDRAMINE HYDROCHLORIDE 50 MG/ML
25-50 INJECTION INTRAMUSCULAR; INTRAVENOUS
Status: DISCONTINUED | OUTPATIENT
Start: 2021-12-07 | End: 2021-12-08 | Stop reason: HOSPADM

## 2021-12-07 RX ORDER — ACYCLOVIR 200 MG/1
0-1 CAPSULE ORAL
Status: DISCONTINUED | OUTPATIENT
Start: 2021-12-07 | End: 2021-12-08 | Stop reason: HOSPADM

## 2021-12-07 RX ORDER — DIPHENHYDRAMINE HCL 25 MG
25 CAPSULE ORAL
Status: DISCONTINUED | OUTPATIENT
Start: 2021-12-07 | End: 2021-12-08 | Stop reason: HOSPADM

## 2021-12-07 RX ORDER — AMINOPHYLLINE 25 MG/ML
100 INJECTION, SOLUTION INTRAVENOUS ONCE
Status: DISCONTINUED | OUTPATIENT
Start: 2021-12-07 | End: 2021-12-08 | Stop reason: HOSPADM

## 2021-12-07 RX ORDER — REGADENOSON 0.08 MG/ML
0.4 INJECTION, SOLUTION INTRAVENOUS ONCE
Status: COMPLETED | OUTPATIENT
Start: 2021-12-07 | End: 2021-12-07

## 2021-12-07 RX ORDER — METHYLPREDNISOLONE SODIUM SUCCINATE 125 MG/2ML
125 INJECTION, POWDER, LYOPHILIZED, FOR SOLUTION INTRAMUSCULAR; INTRAVENOUS
Status: DISCONTINUED | OUTPATIENT
Start: 2021-12-07 | End: 2021-12-08 | Stop reason: HOSPADM

## 2021-12-07 RX ADMIN — GADOBUTROL 28 ML: 604.72 INJECTION INTRAVENOUS at 15:50

## 2021-12-07 RX ADMIN — REGADENOSON 0.4 MG: 0.08 INJECTION, SOLUTION INTRAVENOUS at 14:58

## 2021-12-17 ENCOUNTER — TELEPHONE (OUTPATIENT)
Dept: CARDIOLOGY | Facility: CLINIC | Age: 71
End: 2021-12-17
Payer: COMMERCIAL

## 2021-12-17 DIAGNOSIS — I35.0 AORTIC STENOSIS, MODERATE: Primary | ICD-10-CM

## 2021-12-17 DIAGNOSIS — I25.10 CORONARY ARTERY DISEASE INVOLVING NATIVE CORONARY ARTERY OF NATIVE HEART WITHOUT ANGINA PECTORIS: ICD-10-CM

## 2021-12-17 NOTE — TELEPHONE ENCOUNTER
Call placed to pt to review results and recommendations:     CMRI:   CONCLUSIONS:   1. Normal LV and RV systolic functions. LVEF 70%.  2. Late gadolinium enhancement imaging shows no MI, fibrosis or infiltrative disease  3. Small size mild mid inferolateral ischemia on regadenoson stress perfusion.  4. Moderate aortic valve stenosis.    ----- Message from Zac Cabrera MD sent at 12/15/2021  7:59 AM CST -----  Results reviewed, please let the patient know that the MRI demonstrates normal cardiac structure and function, as well as the moderate aortic stenosis which we are aware of. It did demonstrate a small area of mild ischemia in the area of his prior stenting, and so it is possible he may have a small blockage in the prior artery or in the prior stents. One option would be to continue to treat this medically and to monitor for change in symptoms, however if he is having worsening dyspnea on exertion or chest discomfort, I would recommend a repeat cath at [facility]. Please have him see a cardiology RICKY at [place] to discuss, thanks!      No answer - LVM/ ACB 12/17/21 8:49 AM EUNICE Lovelace RN, BSN.     ++ call back received form pt. Pt reports that he feels generally well. No reports of SOB/ GARRIDO / CP. Pt denies need for any further eval/ intervention at this time. Pt would like to continue medical management. Pt to call upon return form [facility] to schedule recommended follow up.   EUNICE Lovelace RN, BSN. 12/17/21 2:06 PM

## 2021-12-18 ENCOUNTER — HEALTH MAINTENANCE LETTER (OUTPATIENT)
Age: 71
End: 2021-12-18

## 2022-01-05 DIAGNOSIS — I25.10 CORONARY ARTERY DISEASE INVOLVING NATIVE CORONARY ARTERY OF NATIVE HEART WITHOUT ANGINA PECTORIS: ICD-10-CM

## 2022-01-10 RX ORDER — METOPROLOL SUCCINATE 25 MG/1
12.5 TABLET, EXTENDED RELEASE ORAL DAILY
Qty: 45 TABLET | Refills: 3 | Status: SHIPPED | OUTPATIENT
Start: 2022-01-10 | End: 2022-10-24

## 2022-01-10 NOTE — TELEPHONE ENCOUNTER
Routing refill request to provider for review/approval because:   Beta-Blockers Protocol Failed 01/05/2022 06:47 PM   Protocol Details  Blood pressure under 140/90 in past 12 months        Zulema Garcia RN

## 2022-02-12 ENCOUNTER — HEALTH MAINTENANCE LETTER (OUTPATIENT)
Age: 72
End: 2022-02-12

## 2022-04-18 ENCOUNTER — OFFICE VISIT (OUTPATIENT)
Dept: PEDIATRICS | Facility: CLINIC | Age: 72
End: 2022-04-18
Payer: COMMERCIAL

## 2022-04-18 VITALS
TEMPERATURE: 97 F | SYSTOLIC BLOOD PRESSURE: 122 MMHG | HEART RATE: 95 BPM | RESPIRATION RATE: 16 BRPM | WEIGHT: 239 LBS | BODY MASS INDEX: 35.29 KG/M2 | OXYGEN SATURATION: 96 % | DIASTOLIC BLOOD PRESSURE: 60 MMHG

## 2022-04-18 DIAGNOSIS — I25.10 CORONARY ARTERY DISEASE INVOLVING NATIVE CORONARY ARTERY OF NATIVE HEART WITHOUT ANGINA PECTORIS: ICD-10-CM

## 2022-04-18 DIAGNOSIS — E11.65 TYPE 2 DIABETES MELLITUS WITH HYPERGLYCEMIA, WITHOUT LONG-TERM CURRENT USE OF INSULIN (H): Primary | ICD-10-CM

## 2022-04-18 DIAGNOSIS — I35.0 AORTIC STENOSIS, MODERATE: ICD-10-CM

## 2022-04-18 DIAGNOSIS — E78.5 HYPERLIPIDEMIA WITH TARGET LDL LESS THAN 70: ICD-10-CM

## 2022-04-18 DIAGNOSIS — I10 ESSENTIAL HYPERTENSION WITH GOAL BLOOD PRESSURE LESS THAN 140/90: ICD-10-CM

## 2022-04-18 PROCEDURE — 99214 OFFICE O/P EST MOD 30 MIN: CPT | Performed by: INTERNAL MEDICINE

## 2022-04-18 RX ORDER — TAMSULOSIN HYDROCHLORIDE 0.4 MG/1
0.4 CAPSULE ORAL DAILY
Qty: 90 CAPSULE | Refills: 11 | Status: SHIPPED | OUTPATIENT
Start: 2022-04-18 | End: 2022-09-22

## 2022-04-18 RX ORDER — GLIPIZIDE 5 MG/1
5 TABLET, FILM COATED, EXTENDED RELEASE ORAL DAILY
Qty: 90 TABLET | Refills: 1 | Status: SHIPPED | OUTPATIENT
Start: 2022-04-18 | End: 2022-09-22

## 2022-04-18 RX ORDER — LISINOPRIL 10 MG/1
10 TABLET ORAL DAILY
Qty: 90 TABLET | Refills: 11 | Status: SHIPPED | OUTPATIENT
Start: 2022-04-18 | End: 2023-04-24

## 2022-04-18 RX ORDER — PIOGLITAZONEHYDROCHLORIDE 15 MG/1
TABLET ORAL
Qty: 90 TABLET | Refills: 1 | Status: SHIPPED | OUTPATIENT
Start: 2022-04-18 | End: 2022-10-24

## 2022-04-18 NOTE — PROGRESS NOTES
Assessment & Plan     (E11.65) Type 2 diabetes mellitus with hyperglycemia, without long-term current use of insulin (H)  (primary encounter diagnosis)  Comment:     Well controlled.  Return for fasting labwork.  If A1c has improved/discussed stopping glipizide  Plan: HEMOGLOBIN A1C, glipiZIDE (GLUCOTROL XL) 5 MG         24 hr tablet, metFORMIN (GLUCOPHAGE) 1000 MG         tablet, pioglitazone (ACTOS) 15 MG tablet,         empagliflozin (JARDIANCE) 25 MG TABS tablet,           (I25.10) Coronary artery disease involving native coronary artery of native heart without angina pectoris  Comment:   Plan: stable, continue ASA/metoprolol/statin    (I10) Essential hypertension with goal blood pressure less than 140/90  Comment: well controlled  Plan: lisinopril (ZESTRIL) 10 MG tablet          (E78.5) Hyperlipidemia with target LDL less than 70  Comment: well controlled, return for fasting labs  Plan: Lipid panel reflex to direct LDL Fasting,         Comprehensive metabolic panel (BMP + Alb, Alk         Phos, ALT, AST, Total. Bili, TP)              Return in about 6 months (around 10/18/2022) for Diabetes follow-up.    Jesus Cherry MD  [facility] Morales is a 71 year old who presents for the following health issues     History of Present Illness       Diabetes:   He presents for follow up of diabetes.  He is checking home blood glucose three times daily. He checks blood glucose before meals and before and after meals.  Blood glucose is never over 200 and sometimes under 70. He is aware of hypoglycemia symptoms including shakiness. He has no concerns regarding his diabetes at this time.  He is not experiencing numbness or burning in feet, excessive thirst, blurry vision, weight changes or redness, sores or blisters on feet.         He eats 2-3 servings of fruits and vegetables daily.He consumes 0 sweetened beverage(s) daily.He exercises with enough effort to increase his heart rate 10 to 19  minutes per day.  He exercises with enough effort to increase his heart rate 3 or less days per week.   He is taking medications regularly.     Pt states he checks his blood sugar maybe once a week     Diabetes Follow-up      How often are you checking your blood sugar? daily    What concerns do you have today about your diabetes? None     Do you have any of these symptoms? (Select all that apply)  No numbness or tingling in feet.  No redness, sores or blisters on feet.  No complaints of excessive thirst.  No reports of blurry vision.  No significant changes to weight.      BP Readings from Last 2 Encounters:   04/18/22 122/60   12/07/21 (!) 153/55     Hemoglobin A1C POCT (%)   Date Value   01/11/2021 7.9 (H)   08/24/2020 7.3 (H)     Hemoglobin A1C (%)   Date Value   08/05/2021 6.8 (H)     LDL Cholesterol Calculated (mg/dL)   Date Value   08/05/2021 48   08/24/2020 43   10/31/2019 59                Patient Active Problem List   Diagnosis     Prostate cancer (H):[place] 2010     Colon polyps     Chronic right shoulder pain- recurrent since 2013     Cervical spinal stenosis     Type 2 diabetes mellitus with hyperglycemia, without long-term current use of insulin (H)     Essential hypertension with goal blood pressure less than 140/90     Hyperlipidemia with target LDL less than 70     Coronary artery disease involving native coronary artery of native heart without angina pectoris     Diverticulosis of large intestine     2019 novel coronavirus disease (COVID-19)     Aortic stenosis, moderate     Current Outpatient Medications   Medication Sig Dispense Refill     aspirin 81 MG tablet Take 1 tablet (81 mg) by mouth daily 90 tablet 11     atorvastatin (LIPITOR) 40 MG tablet Take 1 tablet (40 mg) by mouth At Bedtime 90 tablet 3     blood glucose (NO BRAND SPECIFIED) test strip Use to test blood sugar 1 times daily or as directed. 100 each 11     blood glucose calibration (NO BRAND SPECIFIED) solution Use to calibrate blood  glucose monitor as needed as directed. 1 each 11     blood glucose monitoring (NO BRAND SPECIFIED) meter device kit Use to test blood sugar 1 times daily or as directed. 1 kit 0     empagliflozin (JARDIANCE) 25 MG TABS tablet Take 1 tablet (25 mg) by mouth daily 90 tablet 3     glipiZIDE (GLUCOTROL XL) 5 MG 24 hr tablet Take 1 tablet (5 mg) by mouth daily 90 tablet 1     lisinopril (ZESTRIL) 10 MG tablet Take 1 tablet (10 mg) by mouth daily 90 tablet 3     metFORMIN (GLUCOPHAGE) 1000 MG tablet TAKE 1 TABLET BY MOUTH TWICE DAILY WITH MEALS 180 tablet 1     metoprolol succinate ER (TOPROL-XL) 25 MG 24 hr tablet Take 0.5 tablets (12.5 mg) by mouth daily 45 tablet 3     multivitamin, therapeutic (THERA-VIT) TABS tablet Take 1 tablet by mouth daily       nitroGLYcerin (NITROSTAT) 0.4 MG sublingual tablet For chest pain place 1 tablet under the tongue every 5 minutes for 3 doses. If symptoms persist 5 minutes after 1st dose call 911. 20 tablet 3     omeprazole 20 MG tablet Take 20 mg by mouth daily.       pioglitazone (ACTOS) 15 MG tablet TAKE 1 TABLET EVERY DAY 90 tablet 1     tamsulosin (FLOMAX) 0.4 MG capsule Take 1 capsule (0.4 mg) by mouth daily 90 capsule 3     thin (NO BRAND SPECIFIED) lancets Use to test blood sugar 1 times daily or as directed. 100 each 11     triamcinolone (KENALOG) 0.1 % external cream Apply topically 2 times daily as needed for irritation 80 g 1            Objective    /60   Pulse 95   Temp 97  F (36.1  C) (Tympanic)   Resp 16   Wt 108.4 kg (239 lb)   SpO2 96%   BMI 35.29 kg/m    Body mass index is 35.29 kg/m .  Physical Exam   GENERAL: healthy, alert and no distress  NECK: no adenopathy, no asymmetry, masses, or scars and thyroid normal to palpation  RESP: lungs clear to auscultation - no rales, rhonchi or wheezes  CV: regular rate and rhythm, normal S1 S2, no S3 or S4, no murmur  MS: no gross musculoskeletal defects noted, no edema  NEURO: Normal strength and tone, mentation  intact and speech normal  PSYCH: mentation appears normal, affect normal/bright

## 2022-05-04 ENCOUNTER — LAB (OUTPATIENT)
Dept: LAB | Facility: CLINIC | Age: 72
End: 2022-05-04
Payer: COMMERCIAL

## 2022-05-04 DIAGNOSIS — E78.5 HYPERLIPIDEMIA WITH TARGET LDL LESS THAN 70: ICD-10-CM

## 2022-05-04 LAB
ALT SERPL W P-5'-P-CCNC: 30 U/L (ref 0–70)
CHOLEST SERPL-MCNC: 129 MG/DL
FASTING STATUS PATIENT QL REPORTED: YES
HDLC SERPL-MCNC: 63 MG/DL
LDLC SERPL CALC-MCNC: 38 MG/DL
NONHDLC SERPL-MCNC: 66 MG/DL
TRIGL SERPL-MCNC: 140 MG/DL

## 2022-05-04 PROCEDURE — 84460 ALANINE AMINO (ALT) (SGPT): CPT

## 2022-05-04 PROCEDURE — 36415 COLL VENOUS BLD VENIPUNCTURE: CPT

## 2022-05-04 PROCEDURE — 80061 LIPID PANEL: CPT

## 2022-07-05 DIAGNOSIS — E11.65 TYPE 2 DIABETES MELLITUS WITH HYPERGLYCEMIA, WITHOUT LONG-TERM CURRENT USE OF INSULIN (H): ICD-10-CM

## 2022-07-07 RX ORDER — PIOGLITAZONEHYDROCHLORIDE 15 MG/1
TABLET ORAL
Qty: 90 TABLET | Refills: 1 | OUTPATIENT
Start: 2022-07-07

## 2022-09-01 ENCOUNTER — TRANSFERRED RECORDS (OUTPATIENT)
Dept: MULTI SPECIALTY CLINIC | Facility: CLINIC | Age: 72
End: 2022-09-01

## 2022-09-01 LAB — RETINOPATHY: NORMAL

## 2022-09-21 DIAGNOSIS — E11.65 TYPE 2 DIABETES MELLITUS WITH HYPERGLYCEMIA, WITHOUT LONG-TERM CURRENT USE OF INSULIN (H): Primary | ICD-10-CM

## 2022-09-22 RX ORDER — TAMSULOSIN HYDROCHLORIDE 0.4 MG/1
0.4 CAPSULE ORAL DAILY
Qty: 90 CAPSULE | Refills: 0 | Status: SHIPPED | OUTPATIENT
Start: 2022-09-22 | End: 2022-10-24

## 2022-09-22 RX ORDER — GLIPIZIDE 5 MG/1
5 TABLET, FILM COATED, EXTENDED RELEASE ORAL DAILY
Qty: 90 TABLET | Refills: 0 | Status: SHIPPED | OUTPATIENT
Start: 2022-09-22 | End: 2022-12-16

## 2022-09-22 NOTE — TELEPHONE ENCOUNTER
3 month peyton refill approved. Further refills will be addressed at scheduled visit.     Joaquina Hernandez RN on 9/22/2022 at 10:20 AM

## 2022-10-03 ENCOUNTER — HOSPITAL ENCOUNTER (OUTPATIENT)
Dept: CARDIOLOGY | Facility: CLINIC | Age: 72
Discharge: HOME | End: 2022-10-03
Attending: INTERNAL MEDICINE | Admitting: INTERNAL MEDICINE
Payer: COMMERCIAL

## 2022-10-03 DIAGNOSIS — I35.0 AORTIC STENOSIS, MODERATE: ICD-10-CM

## 2022-10-03 LAB — LVEF ECHO: NORMAL

## 2022-10-03 PROCEDURE — 93306 TTE W/DOPPLER COMPLETE: CPT | Mod: 26 | Performed by: INTERNAL MEDICINE

## 2022-10-03 PROCEDURE — 255N000002 HC RX 255 OP 636: Performed by: INTERNAL MEDICINE

## 2022-10-03 RX ADMIN — HUMAN ALBUMIN MICROSPHERES AND PERFLUTREN 3 ML: 10; .22 INJECTION, SOLUTION INTRAVENOUS at 10:40

## 2022-10-05 ENCOUNTER — OFFICE VISIT (OUTPATIENT)
Dept: CARDIOLOGY | Facility: CLINIC | Age: 72
End: 2022-10-05
Attending: INTERNAL MEDICINE
Payer: COMMERCIAL

## 2022-10-05 VITALS
OXYGEN SATURATION: 96 % | HEIGHT: 69 IN | DIASTOLIC BLOOD PRESSURE: 62 MMHG | HEART RATE: 78 BPM | WEIGHT: 237.2 LBS | SYSTOLIC BLOOD PRESSURE: 120 MMHG | BODY MASS INDEX: 35.13 KG/M2

## 2022-10-05 DIAGNOSIS — I25.10 CORONARY ARTERY DISEASE INVOLVING NATIVE CORONARY ARTERY OF NATIVE HEART WITHOUT ANGINA PECTORIS: ICD-10-CM

## 2022-10-05 DIAGNOSIS — I35.0 AORTIC STENOSIS, MODERATE: ICD-10-CM

## 2022-10-05 DIAGNOSIS — I35.0 NONRHEUMATIC AORTIC VALVE STENOSIS: Primary | ICD-10-CM

## 2022-10-05 DIAGNOSIS — E78.5 HYPERLIPIDEMIA WITH TARGET LDL LESS THAN 70: ICD-10-CM

## 2022-10-05 PROCEDURE — 99214 OFFICE O/P EST MOD 30 MIN: CPT | Performed by: INTERNAL MEDICINE

## 2022-10-05 RX ORDER — ATORVASTATIN CALCIUM 40 MG/1
40 TABLET, FILM COATED ORAL AT BEDTIME
Qty: 90 TABLET | Refills: 3 | Status: SHIPPED | OUTPATIENT
Start: 2022-10-05 | End: 2023-10-24

## 2022-10-05 RX ORDER — NITROGLYCERIN 0.4 MG/1
TABLET SUBLINGUAL
Qty: 25 TABLET | Refills: 3 | Status: SHIPPED | OUTPATIENT
Start: 2022-10-05 | End: 2023-10-25

## 2022-10-05 NOTE — PROGRESS NOTES
Cardiology Clinic Progress Note:    October 5, 2022   Patient Name: Héctor Quispe  Patient MRN: [id number]     Consult indication: aortic stenosis    HPI:    I had the opportunity to see patient Héctor Quispe in cardiology clinic for a follow up visit. Patient is followed by our colleague Jesus Cherry MD with Primary Care.     As you know, patient is a pleasant 72-year-old male with a past medical history significant for coronary artery disease with NSTEMI status post PCI (BMS to RCA 1996, BMS x2 to RCA near prior stent 2002), hypertension, hyperlipidemia, diabetes, obesity, aortic stenosis, who presents for further evaluation and management of aortic stenosis.     Patient has a remote history of coronary artery disease.  In 1996, he presented with unstable angina resulting in a small NSTEMI.  He underwent cardiac catheterization with bare-metal stent placement to the RCA.  In 2002, he had another NSTEMI, for which he had 2 overlapping 4 mm bare-metal stents in the RCA near the prior stent.     Since then, he had been doing reasonably well, though he does note that over the past year or so, he has had worsening shortness of breath with exertion.  He denies any chest pain or chest pressure.  He denies any symptoms of orthopnea/PND or abnormal lower extremity swelling.     He was seen by his primary care physician, Dr. Cherry, and on exam was noted to have a cardiac murmur.  Patient underwent a TTE on 8/6/2021 that demonstrated normal biventricular function, moderate aortic stenosis, Vmax 2.4 m/s, mean gradient 13 mmHg, ARMANDO 1.4cm2, DI 0.38. SVi 35 cc/m2.    I had seen him in clinic for a consultation on 11/17/2021.  At that time, he was experiencing some dyspnea on exertion.  For further evaluation patient was assessed with a cardiac stress MRI 12/7/2021 that demonstrated normal biventricular function, moderate aortic stenosis, small sized area of mild mid inferolateral ischemia.  We discussed options for  further evaluation/management, cardiac catheterization versus continued medical management.  Patient wanted to proceed with medical management.  We also increased atorvastatin to 40 mg nightly.  Follow-up TTE 10/3/2022 demonstrates normal biventricular function, moderate aortic stenosis, V-max 3.1 m/s, mean gradient 21 mmHg, aortic valve area 1.2 cm , DI 0.31.    Overall, patient reports that he has been doing well.  He denies any chest pain, chest pressure, abnormal shortness of breath.  He was recently in [place] a couple of months ago, coincidentally when the [facility] .  He did note some mild dyspnea however this was not particularly bothersome.    Prior symptoms of angina were significant left-sided chest pain with radiation to his arm, which was consistent from his first MI in , and his second MI in .  He has not had recurrence of the symptoms since then.    Reviewed last lipids, favorable, LDL at goal of less than 70.    ECG 2021 demonstrates normal sinus rhythm at 78 bpm, borderline OR interval 190 ms, incomplete right bundle branch block, nonspecific ST segment changes.   ms.    Assessment and Plan/Recommendations:    # CAD, with NSTEMI status post PCI (BMS to RCA , BMS x2 to RCA near prior stent ), CMR stress test 2021 demonstrates small sized area of mild mid inferolateral ischemia, medically managed.   # Moderate aortic stenosis, degenerative/non-rheumatic,  V-max 3.1 m/s, mean gradient 21 mmHg, aortic valve area 1.2 cm , DI 0.31.  # HTN, well controlled  # HL, LDL at goal <70  # DM2  # Obesity    Overall patient is in stable cardiovascular health without symptoms for angina or decompensated heart failure.  Aortic stenosis is stable.    - Reviewed cardiac stress MRI, discussed option of medical management versus cardiac catheterization.  Since patient is feeling well, without symptoms of angina, will continue medical management and close monitoring.  - TTE in 1 year  -  Continue aspirin, metoprolol succinate, lisinopril, atorvastatin, sublingual nitroglycerin as needed  - Follow-up in 1 year or sooner as needed    Thank you for allowing our team to participate in the care of Héctor Quispe.  Please do not hesitate to call or page me with any questions or concerns.    Sincerely,     Zac Cabrera MD, [facility]  Cardiology  Text Page   October 5, 2022    Voice recognition software utilized.     Total time spent on this encounter: 30 minutes, providing care in this encounter including, but not limited to, reviewing prior medical records, laboratory data, imaging studies, diagnostic studies, procedure notes, formulating an assessment and plan, recommendations, discussion and counseling with patient face to face, dictation.    Past Medical History:     Past Medical History:   Diagnosis Date     2019 novel coronavirus disease (COVID-19) 12/18/2020     Coronary artery disease involving native coronary artery of native heart without angina pectoris 1/6/2016     Essential hypertension 1/6/2016     Type 2 diabetes mellitus with diabetic nephropathy (H) 1/6/2016        Past Surgical History:   Past Surgical History:   Procedure Laterality Date     BRACHYTHERAPY RADIOELEMENTS  2011    brachytherapy for prostate ca     CYSTOSCOPY       ENT SURGERY       STENT, CORONARY, LUIS M      h/o coronary stent x 2       Medications (outpatient):  Current Outpatient Medications   Medication Sig Dispense Refill     aspirin 81 MG tablet Take 1 tablet (81 mg) by mouth daily 90 tablet 11     atorvastatin (LIPITOR) 40 MG tablet Take 1 tablet (40 mg) by mouth At Bedtime 90 tablet 3     blood glucose (NO BRAND SPECIFIED) test strip Use to test blood sugar 1 times daily or as directed. 100 each 11     blood glucose calibration (NO BRAND SPECIFIED) solution Use to calibrate blood glucose monitor as needed as directed. 1 each 11     blood glucose monitoring (NO BRAND SPECIFIED) meter device kit Use to test  blood sugar 1 times daily or as directed. 1 kit 0     empagliflozin (JARDIANCE) 25 MG TABS tablet Take 1 tablet (25 mg) by mouth daily 90 tablet 3     glipiZIDE (GLUCOTROL XL) 5 MG 24 hr tablet Take 1 tablet (5 mg) by mouth daily 90 tablet 0     lisinopril (ZESTRIL) 10 MG tablet Take 1 tablet (10 mg) by mouth daily 90 tablet 11     metFORMIN (GLUCOPHAGE) 1000 MG tablet TAKE 1 TABLET BY MOUTH TWICE DAILY WITH MEALS 180 tablet 1     metoprolol succinate ER (TOPROL-XL) 25 MG 24 hr tablet Take 0.5 tablets (12.5 mg) by mouth daily 45 tablet 3     multivitamin, therapeutic (THERA-VIT) TABS tablet Take 1 tablet by mouth daily       nitroGLYcerin (NITROSTAT) 0.4 MG sublingual tablet For chest pain place 1 tablet under the tongue every 5 minutes for 3 doses. If symptoms persist 5 minutes after 1st dose call 911. 20 tablet 3     omeprazole 20 MG tablet Take 20 mg by mouth daily.       pioglitazone (ACTOS) 15 MG tablet TAKE 1 TABLET EVERY DAY 90 tablet 1     tamsulosin (FLOMAX) 0.4 MG capsule Take 1 capsule (0.4 mg) by mouth daily 90 capsule 0     thin (NO BRAND SPECIFIED) lancets Use to test blood sugar 1 times daily or as directed. 100 each 11     triamcinolone (KENALOG) 0.1 % external cream Apply topically 2 times daily as needed for irritation 80 g 1       Allergies:  No Known Allergies    Social History:   History   Drug Use No      History   Smoking Status     Former Smoker     Types: Cigarettes     Quit date: 8/2/2002   Smokeless Tobacco     Never Used     Social History    Substance and Sexual Activity      Alcohol use: Yes        Alcohol/week: 3.0 standard drinks        Types: 3 Cans of beer per week       Family History:  Family History   Problem Relation Age of Onset     Alzheimer Disease Mother      Cancer Father      Diabetes No family hx of      Coronary Artery Disease No family hx of      Hypertension No family hx of      Hyperlipidemia No family hx of      Cerebrovascular Disease No family hx of      Breast  "Cancer No family hx of      Colon Cancer No family hx of      Prostate Cancer No family hx of      Other Cancer No family hx of      Depression No family hx of      Anxiety Disorder No family hx of      Mental Illness No family hx of      Substance Abuse No family hx of      Anesthesia Reaction No family hx of      Asthma No family hx of      Osteoporosis No family hx of      Genetic Disorder No family hx of      Thyroid Disease No family hx of      Obesity No family hx of      Unknown/Adopted No family hx of        Review of Systems:   A complete review of systems was negative except as mentioned in the History of Present Illness.     Objective & Physical Exam:  /62   Pulse 78   Ht 1.753 m (5' 9\")   Wt 107.6 kg (237 lb 3.2 oz)   SpO2 96%   BMI 35.03 kg/m    Wt Readings from Last 2 Encounters:   10/05/22 107.6 kg (237 lb 3.2 oz)   04/18/22 108.4 kg (239 lb)     Body mass index is 35.03 kg/m .   Body surface area is 2.29 meters squared.    Constitutional: appears stated age, in no apparent distress, appears to be well nourished  Head: normocephalic, atraumatic  Neck: supple, trachea midline   Pulmonary: clear to auscultation bilaterally, no wheezes, no rales, no increased work of breathing  Cardiovascular: JVP normal, regular rate, regular rhythm, 2/6 DEBBIE at the RUSB, no lower extremity edema  Gastrointestinal: no guarding, non-rigid   Neurologic: awake, alert, moves all extremities  Skin: no jaundice, warm on limited exam  Psychiatric: affect is normal, answers questions appropriately, oriented to self and place    Data reviewed:  Lab Results   Component Value Date    WBC 4.0 05/06/2015    RBC 4.04 (L) 05/06/2015    HGB 12.9 (L) 05/06/2015    HCT 39.3 (L) 05/06/2015    MCV 97 05/06/2015    MCH 31.9 05/06/2015    MCHC 32.8 05/06/2015    RDW 13.2 05/06/2015     05/06/2015     Sodium   Date Value Ref Range Status   08/05/2021 135 133 - 144 mmol/L Final   08/24/2020 138 133 - 144 mmol/L Final "     Potassium   Date Value Ref Range Status   08/05/2021 4.6 3.4 - 5.3 mmol/L Final   08/24/2020 4.1 3.4 - 5.3 mmol/L Final     Chloride   Date Value Ref Range Status   08/05/2021 103 94 - 109 mmol/L Final   08/24/2020 105 94 - 109 mmol/L Final     Carbon Dioxide   Date Value Ref Range Status   08/24/2020 26 20 - 32 mmol/L Final     Carbon Dioxide (CO2)   Date Value Ref Range Status   08/05/2021 25 20 - 32 mmol/L Final     Anion Gap   Date Value Ref Range Status   08/05/2021 7 3 - 14 mmol/L Final   08/24/2020 7 3 - 14 mmol/L Final     Glucose   Date Value Ref Range Status   08/05/2021 140 (H) 70 - 99 mg/dL Final   08/24/2020 146 (H) 70 - 99 mg/dL Final     Comment:     Fasting specimen     Urea Nitrogen   Date Value Ref Range Status   08/05/2021 23 7 - 30 mg/dL Final   08/24/2020 24 7 - 30 mg/dL Final     Creatinine   Date Value Ref Range Status   08/05/2021 1.05 0.66 - 1.25 mg/dL Final   08/24/2020 0.95 0.66 - 1.25 mg/dL Final     GFR Estimate   Date Value Ref Range Status   08/05/2021 70 >60 mL/min/1.73m2 Final     Comment:     As of July 11, 2021, eGFR is calculated by the CKD-EPI creatinine equation, without race adjustment. eGFR can be influenced by muscle mass, exercise, and diet. The reported eGFR is an estimation only and is only applicable if the renal function is stable.   08/24/2020 79 >60 mL/min/[1.73_m2] Final     Comment:     Non  GFR Calc  Starting 12/18/2018, serum creatinine based estimated GFR (eGFR) will be   calculated using the Chronic Kidney Disease Epidemiology Collaboration   (CKD-EPI) equation.       Calcium   Date Value Ref Range Status   08/05/2021 9.3 8.5 - 10.1 mg/dL Final   08/24/2020 8.5 8.5 - 10.1 mg/dL Final     Bilirubin Total   Date Value Ref Range Status   08/05/2021 0.5 0.2 - 1.3 mg/dL Final   08/24/2020 0.4 0.2 - 1.3 mg/dL Final     Alkaline Phosphatase   Date Value Ref Range Status   08/05/2021 59 40 - 150 U/L Final   08/24/2020 69 40 - 150 U/L Final     ALT    Date Value Ref Range Status   2022 30 0 - 70 U/L Final   2020 30 0 - 70 U/L Final     AST   Date Value Ref Range Status   2021 14 0 - 45 U/L Final   2020 15 0 - 45 U/L Final     Recent Labs   Lab Test 22  0921 21  0800 16  1033 05/06/15  0908   CHOL 129 150   < > 131   HDL 63 69   < > 71   LDL 38 48   < > 50   TRIG 140 165*   < > 50   CHOLHDLRATIO  --   --   --  1.8    < > = values in this interval not displayed.      Lab Results   Component Value Date    A1C 6.8 2021    A1C 7.9 2021    A1C 7.3 2020    A1C 8.4 10/31/2019    A1C 7.5 2019    A1C 7.2 2018        Recent Results (from the past 4320 hour(s))   Echocardiogram Complete   Result Value    LVEF  60%    Narrative    [id number]  [id number]  [id number]  [id number]^CHUCHO^LIDIA^TIFFANI     [facility]  Echocardiography Laboratory  [address] [place] [address]     Name: LUDMILA CODY  MRN: [id number]  : 1950  Study Date: 10/03/2022 09:49 AM  Age: 72 yrs  Gender: Male  Patient Location: [facility]  Reason For Study: Aortic stenosis, moderate  Ordering Physician: LIDIA RANKIN  Referring Physician: LIDIA RANKIN  Performed By: MARGUERITE Avendano     BSA: 2.2 m2  Height: 70 in  Weight: 230 lb  HR: 75  BP: 126/74 mmHg  ______________________________________________________________________________  Procedure  Complete Echo Adult. Optison (NDC #[id number]) given intravenously.  ______________________________________________________________________________  Interpretation Summary     1. The left ventricle is normal in structure, function and size. The visual  ejection fraction is estimated at 60%.  2. The right ventricle is normal in structure, function and size.  3. Moderate valvular aortic stenosis. Mean 21mmHg, Vmax 3.1m/s, ARMANDO 1.2cm2, DI  0.31.     Echo 2021 showed EF 60%, AS with mean 13mmHg, Vmax 2.4m/s, ARMANDO  1.4cm2.  ______________________________________________________________________________  Left Ventricle  The left ventricle is normal in structure, function and size. There is normal  left ventricular wall thickness. The visual ejection fraction is estimated at  60%. Left ventricular diastolic function is normal. Normal left ventricular  wall motion.     Right Ventricle  The right ventricle is normal in structure, function and size.     Atria  Normal left atrial size. Right atrial size is normal. There is no atrial shunt  seen.     Mitral Valve  The mitral valve is normal in structure and function.     Tricuspid Valve  No tricuspid regurgitation. Right ventricular systolic pressure could not be  approximated due to inadequate tricuspid regurgitation.     Aortic Valve  Moderate valvular aortic stenosis. Mean 21mmHg, Vmax 3.1m/s, ARMANDO 1.2cm2, DI  0.31.     Pulmonic Valve  The pulmonic valve is normal in structure and function.     Vessels  Normal ascending, transverse (arch), and descending aorta. The inferior vena  cava was normal in size with preserved respiratory variability.     Pericardium  There is no pericardial effusion.     Rhythm  Sinus rhythm was noted.  ______________________________________________________________________________  MMode/2D Measurements & Calculations  IVSd: 1.0 cm     LVIDd: 4.5 cm  LVIDs: 3.3 cm  LVPWd: 1.0 cm  FS: 27.3 %  LV mass(C)d: 159.6 grams  LV mass(C)dI: 72.0 grams/m2  Ao root diam: 3.6 cm  LA dimension: 4.6 cm  asc Aorta Diam: 3.1 cm  LA/Ao: 1.3  LVOT diam: 2.2 cm  LVOT area: 3.8 cm2  LA Volume (BP): 56.8 ml  LA Volume Index (BP): 25.6 ml/m2  RWT: 0.46     Doppler Measurements & Calculations  MV E max bebeto: 62.6 cm/sec  MV A max bebeto: 94.1 cm/sec  MV E/A: 0.67  MV max P.8 mmHg  MV mean P.7 mmHg  MV V2 VTI: 24.1 cm  MVA(VTI): 3.3 cm2  MV dec time: 0.22 sec  Ao V2 max: 307.4 cm/sec  Ao max P.0 mmHg  Ao V2 mean: 215.0 cm/sec  Ao mean P.3 mmHg  Ao V2 VTI: 65.0  cm  ARMANDO(I,D): 1.2 cm2  ARMANDO(V,D): 1.2 cm2  LV V1 max PG: 3.6 mmHg  LV V1 max: 95.1 cm/sec  LV V1 VTI: 21.2 cm  SV(LVOT): 79.5 ml  SI(LVOT): 35.9 ml/m2  PA acc time: 0.08 sec  AV Jorge Luis Ratio (DI): 0.31  ARMANDO Index (cm2/m2): 0.55  E/E' av.8  Lateral E/e': 9.3  Medial E/e': 10.3     ______________________________________________________________________________  Report approved by: Marisol Segovia 10/03/2022 11:05 AM

## 2022-10-05 NOTE — PATIENT INSTRUCTIONS
October 5, 2022    Thank you for allowing our Cardiology team to participate in your care.     Please note the following changes to your heart treatment plan:     Medication changes:   - none    Tests to be done:  - TTE in 1 year    Follow up:  - Follow up in 1 year, or sooner as needed.      Please contact our team at [phone] or [phone] for any questions or concerns.   For scheduling, please call [phone].  If you are having a medical emergency, please call [phone].     Sincerely,    Zac Cabrera MD, FACC  Cardiology    [facility]

## 2022-10-09 ENCOUNTER — HEALTH MAINTENANCE LETTER (OUTPATIENT)
Age: 72
End: 2022-10-09

## 2022-10-24 ENCOUNTER — OFFICE VISIT (OUTPATIENT)
Dept: PEDIATRICS | Facility: CLINIC | Age: 72
End: 2022-10-24
Payer: COMMERCIAL

## 2022-10-24 ENCOUNTER — ANCILLARY PROCEDURE (OUTPATIENT)
Dept: GENERAL RADIOLOGY | Facility: CLINIC | Age: 72
End: 2022-10-24
Attending: INTERNAL MEDICINE
Payer: COMMERCIAL

## 2022-10-24 VITALS
OXYGEN SATURATION: 95 % | DIASTOLIC BLOOD PRESSURE: 62 MMHG | WEIGHT: 235 LBS | RESPIRATION RATE: 16 BRPM | SYSTOLIC BLOOD PRESSURE: 122 MMHG | TEMPERATURE: 97 F | HEART RATE: 77 BPM | BODY MASS INDEX: 34.7 KG/M2

## 2022-10-24 DIAGNOSIS — E78.5 HYPERLIPIDEMIA WITH TARGET LDL LESS THAN 70: ICD-10-CM

## 2022-10-24 DIAGNOSIS — E11.65 TYPE 2 DIABETES MELLITUS WITH HYPERGLYCEMIA, WITHOUT LONG-TERM CURRENT USE OF INSULIN (H): Primary | ICD-10-CM

## 2022-10-24 DIAGNOSIS — I10 ESSENTIAL HYPERTENSION WITH GOAL BLOOD PRESSURE LESS THAN 140/90: ICD-10-CM

## 2022-10-24 DIAGNOSIS — R05.9 COUGH, UNSPECIFIED TYPE: ICD-10-CM

## 2022-10-24 DIAGNOSIS — C61 PROSTATE CANCER (H): ICD-10-CM

## 2022-10-24 DIAGNOSIS — I25.10 CORONARY ARTERY DISEASE INVOLVING NATIVE CORONARY ARTERY OF NATIVE HEART WITHOUT ANGINA PECTORIS: ICD-10-CM

## 2022-10-24 PROCEDURE — 90662 IIV NO PRSV INCREASED AG IM: CPT | Performed by: INTERNAL MEDICINE

## 2022-10-24 PROCEDURE — G0008 ADMIN INFLUENZA VIRUS VAC: HCPCS | Performed by: INTERNAL MEDICINE

## 2022-10-24 PROCEDURE — 99214 OFFICE O/P EST MOD 30 MIN: CPT | Mod: 25 | Performed by: INTERNAL MEDICINE

## 2022-10-24 PROCEDURE — 71046 X-RAY EXAM CHEST 2 VIEWS: CPT | Mod: TC | Performed by: RADIOLOGY

## 2022-10-24 RX ORDER — TAMSULOSIN HYDROCHLORIDE 0.4 MG/1
0.4 CAPSULE ORAL DAILY
Qty: 90 CAPSULE | Refills: 3 | Status: SHIPPED | OUTPATIENT
Start: 2022-10-24 | End: 2023-10-24

## 2022-10-24 RX ORDER — PIOGLITAZONEHYDROCHLORIDE 15 MG/1
TABLET ORAL
Qty: 90 TABLET | Refills: 3 | Status: SHIPPED | OUTPATIENT
Start: 2022-10-24 | End: 2023-04-24

## 2022-10-24 RX ORDER — METOPROLOL SUCCINATE 25 MG/1
12.5 TABLET, EXTENDED RELEASE ORAL DAILY
Qty: 45 TABLET | Refills: 3 | Status: SHIPPED | OUTPATIENT
Start: 2022-10-24 | End: 2023-10-24

## 2022-10-24 ASSESSMENT — PAIN SCALES - GENERAL: PAINLEVEL: NO PAIN (0)

## 2022-10-24 NOTE — PROGRESS NOTES
Assessment & Plan     Type 2 diabetes mellitus with hyperglycemia, without long-term current use of insulin (H)      Metformin 1000 BID, actos 15, glipizide 5, jardiance 25.   A1c improved/at goal, rtc for labwork in Nov, plan for 6 month followup  - Albumin Random Urine Quantitative with Creat Ratio; Future  - metFORMIN (GLUCOPHAGE) 1000 MG tablet; TAKE 1 TABLET BY MOUTH TWICE DAILY WITH MEALS  - pioglitazone (ACTOS) 15 MG tablet; TAKE 1 TABLET EVERY DAY  - Hemoglobin A1c; Future    Coronary artery disease involving native coronary artery of native heart without angina pectoris  - metoprolol succinate ER (TOPROL XL) 25 MG 24 hr tablet; Take 0.5 tablets (12.5 mg) by mouth daily    Essential hypertension with goal blood pressure less than 140/90  Adequate control.  Continue current medication.     Hyperlipidemia with target LDL less than 70  Adequate control.  Continue current medication.     Prostate cancer (H)  - PSA, tumor marker; Future    Cough, unspecified type  Intermittent cough for past few months, tried flonase without much improvement.  Denies SOB, no hx of asthma or COPD.  Denies GERD sx. CXr today: normal  Recommend trial of antihistamines, decongestants and follow-up next few weeks if sx persist  - XR Chest 2 Views; Future                   Return in about 6 months (around 4/24/2023) for Diabetes follow-up.    Jesus Cherry MD  [facility] WILLIAMS Morales is a 72 year old, presenting for the following health issues:  Diabetes      History of Present Illness       Diabetes:   He presents for follow up of diabetes.  He is checking home blood glucose a few times a month. He checks blood glucose before meals.  Blood glucose is never over 200 and never under 70. He is aware of hypoglycemia symptoms including shakiness. He has no concerns regarding his diabetes at this time.  He is not experiencing numbness or burning in feet, excessive thirst, blurry vision, weight changes or  redness, sores or blisters on feet. The patient has had a diabetic eye exam in the last 12 months. Eye exam performed on 30 days ago. Location of last eye exam [facility] eye clinic.        He eats 2-3 servings of fruits and vegetables daily.He consumes 0 sweetened beverage(s) daily.He exercises with enough effort to increase his heart rate 10 to 19 minutes per day.  He exercises with enough effort to increase his heart rate 3 or less days per week.   He is taking medications regularly.     BP     122/62  10/24/2022    Lab Results   Component Value Date     08/05/2021     08/24/2020     Lab Results   Component Value Date    A1C 6.8 08/05/2021    A1C 7.9 01/11/2021     Lab Results   Component Value Date    LDL 38 05/04/2022    LDL 43 08/24/2020     Lab Results   Component Value Date    MICROL 38 08/05/2021    MICROL 109 08/24/2020     Patient Active Problem List   Diagnosis     Prostate cancer (H):[place] 2010     Colon polyps     Chronic right shoulder pain- recurrent since 2013     Cervical spinal stenosis     Type 2 diabetes mellitus with hyperglycemia, without long-term current use of insulin (H)     Essential hypertension with goal blood pressure less than 140/90     Hyperlipidemia with target LDL less than 70     Coronary artery disease involving native coronary artery of native heart without angina pectoris     Diverticulosis of large intestine     2019 novel coronavirus disease (COVID-19)     Aortic stenosis, moderate     Current Outpatient Medications   Medication Sig Dispense Refill     aspirin 81 MG tablet Take 1 tablet (81 mg) by mouth daily 90 tablet 11     atorvastatin (LIPITOR) 40 MG tablet Take 1 tablet (40 mg) by mouth At Bedtime 90 tablet 3     blood glucose (NO BRAND SPECIFIED) test strip Use to test blood sugar 1 times daily or as directed. 100 each 11     blood glucose calibration (NO BRAND SPECIFIED) solution Use to calibrate blood glucose monitor as needed as directed. 1 each 11      blood glucose monitoring (NO BRAND SPECIFIED) meter device kit Use to test blood sugar 1 times daily or as directed. 1 kit 0     empagliflozin (JARDIANCE) 25 MG TABS tablet Take 1 tablet (25 mg) by mouth daily 90 tablet 3     glipiZIDE (GLUCOTROL XL) 5 MG 24 hr tablet Take 1 tablet (5 mg) by mouth daily 90 tablet 0     lisinopril (ZESTRIL) 10 MG tablet Take 1 tablet (10 mg) by mouth daily 90 tablet 11     metFORMIN (GLUCOPHAGE) 1000 MG tablet TAKE 1 TABLET BY MOUTH TWICE DAILY WITH MEALS 180 tablet 3     metoprolol succinate ER (TOPROL XL) 25 MG 24 hr tablet Take 0.5 tablets (12.5 mg) by mouth daily 45 tablet 3     multivitamin, therapeutic (THERA-VIT) TABS tablet Take 1 tablet by mouth daily       nitroGLYcerin (NITROSTAT) 0.4 MG sublingual tablet For chest pain place 1 tablet under the tongue every 5 minutes for 3 doses. If symptoms persist 5 minutes after 1st dose call 911. 25 tablet 3     omeprazole 20 MG tablet Take 20 mg by mouth daily.       pioglitazone (ACTOS) 15 MG tablet TAKE 1 TABLET EVERY DAY 90 tablet 3     tamsulosin (FLOMAX) 0.4 MG capsule Take 1 capsule (0.4 mg) by mouth daily 90 capsule 3     thin (NO BRAND SPECIFIED) lancets Use to test blood sugar 1 times daily or as directed. 100 each 11     triamcinolone (KENALOG) 0.1 % external cream Apply topically 2 times daily as needed for irritation 80 g 1            Review of Systems         Objective    /62   Pulse 77   Temp 97  F (36.1  C) (Tympanic)   Resp 16   Wt 106.6 kg (235 lb)   SpO2 95%   BMI 34.70 kg/m    Body mass index is 34.7 kg/m .  Physical Exam   GENERAL: healthy, alert and no distress  NECK: no adenopathy, no asymmetry, masses, or scars and thyroid normal to palpation  RESP: lungs clear to auscultation - no rales, rhonchi or wheezes  CV: regular rate and rhythm, normal S1 S2, no S3 or S4, no murmur, click or rub, no peripheral edema and peripheral pulses strong  MS: no gross musculoskeletal defects noted, no edema  PSYCH:  mentation appears normal, affect normal/bright

## 2022-11-15 ENCOUNTER — LAB (OUTPATIENT)
Dept: LAB | Facility: CLINIC | Age: 72
End: 2022-11-15
Payer: COMMERCIAL

## 2022-11-15 DIAGNOSIS — C61 PROSTATE CANCER (H): ICD-10-CM

## 2022-11-15 DIAGNOSIS — E11.65 TYPE 2 DIABETES MELLITUS WITH HYPERGLYCEMIA, WITHOUT LONG-TERM CURRENT USE OF INSULIN (H): ICD-10-CM

## 2022-11-15 DIAGNOSIS — E78.5 HYPERLIPIDEMIA WITH TARGET LDL LESS THAN 70: ICD-10-CM

## 2022-11-15 LAB
ALBUMIN SERPL BCG-MCNC: 4.6 G/DL (ref 3.5–5.2)
ALP SERPL-CCNC: 71 U/L (ref 40–129)
ALT SERPL W P-5'-P-CCNC: 20 U/L (ref 10–50)
ANION GAP SERPL CALCULATED.3IONS-SCNC: 14 MMOL/L (ref 7–15)
AST SERPL W P-5'-P-CCNC: 20 U/L (ref 10–50)
BILIRUB SERPL-MCNC: 0.4 MG/DL
BUN SERPL-MCNC: 24.8 MG/DL (ref 8–23)
CALCIUM SERPL-MCNC: 9.1 MG/DL (ref 8.8–10.2)
CHLORIDE SERPL-SCNC: 100 MMOL/L (ref 98–107)
CHOLEST SERPL-MCNC: 138 MG/DL
CREAT SERPL-MCNC: 0.99 MG/DL (ref 0.67–1.17)
CREAT UR-MCNC: 83.5 MG/DL
DEPRECATED HCO3 PLAS-SCNC: 23 MMOL/L (ref 22–29)
GFR SERPL CREATININE-BSD FRML MDRD: 80 ML/MIN/1.73M2
GLUCOSE SERPL-MCNC: 170 MG/DL (ref 70–99)
HBA1C MFR BLD: 7.3 % (ref 0–5.6)
HDLC SERPL-MCNC: 74 MG/DL
LDLC SERPL CALC-MCNC: 42 MG/DL
MICROALBUMIN UR-MCNC: 73.1 MG/L
MICROALBUMIN/CREAT UR: 87.54 MG/G CR (ref 0–17)
NONHDLC SERPL-MCNC: 64 MG/DL
POTASSIUM SERPL-SCNC: 4.5 MMOL/L (ref 3.4–5.3)
PROT SERPL-MCNC: 7.3 G/DL (ref 6.4–8.3)
PSA SERPL-MCNC: <0.01 NG/ML (ref 0–6.5)
SODIUM SERPL-SCNC: 137 MMOL/L (ref 136–145)
TRIGL SERPL-MCNC: 108 MG/DL

## 2022-11-15 PROCEDURE — 82043 UR ALBUMIN QUANTITATIVE: CPT

## 2022-11-15 PROCEDURE — 83036 HEMOGLOBIN GLYCOSYLATED A1C: CPT

## 2022-11-15 PROCEDURE — 84153 ASSAY OF PSA TOTAL: CPT

## 2022-11-15 PROCEDURE — 80053 COMPREHEN METABOLIC PANEL: CPT

## 2022-11-15 PROCEDURE — 80061 LIPID PANEL: CPT

## 2022-11-15 PROCEDURE — 36415 COLL VENOUS BLD VENIPUNCTURE: CPT

## 2022-12-15 DIAGNOSIS — E11.65 TYPE 2 DIABETES MELLITUS WITH HYPERGLYCEMIA, WITHOUT LONG-TERM CURRENT USE OF INSULIN (H): ICD-10-CM

## 2022-12-16 RX ORDER — GLIPIZIDE 5 MG/1
5 TABLET, FILM COATED, EXTENDED RELEASE ORAL DAILY
Qty: 90 TABLET | Refills: 1 | Status: SHIPPED | OUTPATIENT
Start: 2022-12-16 | End: 2023-04-24

## 2022-12-16 NOTE — TELEPHONE ENCOUNTER
Glipizide:  Prescription approved per [facility] Refill Protocol.  Laura Tran RN, BSN  [facility]

## 2022-12-19 DIAGNOSIS — E11.65 TYPE 2 DIABETES MELLITUS WITH HYPERGLYCEMIA, WITHOUT LONG-TERM CURRENT USE OF INSULIN (H): ICD-10-CM

## 2022-12-20 RX ORDER — TAMSULOSIN HYDROCHLORIDE 0.4 MG/1
0.4 CAPSULE ORAL DAILY
Qty: 90 CAPSULE | Refills: 3 | OUTPATIENT
Start: 2022-12-20

## 2022-12-20 RX ORDER — PIOGLITAZONEHYDROCHLORIDE 15 MG/1
TABLET ORAL
Qty: 90 TABLET | Refills: 3 | OUTPATIENT
Start: 2022-12-20

## 2023-02-12 ENCOUNTER — HEALTH MAINTENANCE LETTER (OUTPATIENT)
Age: 73
End: 2023-02-12

## 2023-03-04 NOTE — TELEPHONE ENCOUNTER
Spouse called. Pt. Still having nose bleeds.     Informed her of recommendation below. She agreed and hung up.    Barbie TURNER RN, BSN, PHN  [facility]     verbal cues/1 person assist

## 2023-04-24 ENCOUNTER — OFFICE VISIT (OUTPATIENT)
Dept: PEDIATRICS | Facility: CLINIC | Age: 73
End: 2023-04-24
Payer: COMMERCIAL

## 2023-04-24 VITALS
TEMPERATURE: 97.5 F | SYSTOLIC BLOOD PRESSURE: 130 MMHG | BODY MASS INDEX: 34.7 KG/M2 | RESPIRATION RATE: 16 BRPM | HEART RATE: 68 BPM | DIASTOLIC BLOOD PRESSURE: 62 MMHG | OXYGEN SATURATION: 97 % | WEIGHT: 235 LBS

## 2023-04-24 DIAGNOSIS — E78.5 HYPERLIPIDEMIA WITH TARGET LDL LESS THAN 70: ICD-10-CM

## 2023-04-24 DIAGNOSIS — E11.65 TYPE 2 DIABETES MELLITUS WITH HYPERGLYCEMIA, WITHOUT LONG-TERM CURRENT USE OF INSULIN (H): Primary | ICD-10-CM

## 2023-04-24 DIAGNOSIS — C61 PROSTATE CANCER (H): ICD-10-CM

## 2023-04-24 DIAGNOSIS — I10 ESSENTIAL HYPERTENSION WITH GOAL BLOOD PRESSURE LESS THAN 140/90: ICD-10-CM

## 2023-04-24 PROBLEM — U07.1 2019 NOVEL CORONAVIRUS DISEASE (COVID-19): Status: RESOLVED | Noted: 2020-12-18 | Resolved: 2023-04-24

## 2023-04-24 LAB
ALBUMIN SERPL BCG-MCNC: 4.6 G/DL (ref 3.5–5.2)
ALP SERPL-CCNC: 65 U/L (ref 40–129)
ALT SERPL W P-5'-P-CCNC: 24 U/L (ref 10–50)
ANION GAP SERPL CALCULATED.3IONS-SCNC: 18 MMOL/L (ref 7–15)
AST SERPL W P-5'-P-CCNC: 26 U/L (ref 10–50)
BILIRUB SERPL-MCNC: 0.4 MG/DL
BUN SERPL-MCNC: 22.2 MG/DL (ref 8–23)
CALCIUM SERPL-MCNC: 9.6 MG/DL (ref 8.8–10.2)
CHLORIDE SERPL-SCNC: 101 MMOL/L (ref 98–107)
CHOLEST SERPL-MCNC: 131 MG/DL
CREAT SERPL-MCNC: 0.89 MG/DL (ref 0.67–1.17)
DEPRECATED HCO3 PLAS-SCNC: 20 MMOL/L (ref 22–29)
GFR SERPL CREATININE-BSD FRML MDRD: 90 ML/MIN/1.73M2
GLUCOSE SERPL-MCNC: 132 MG/DL (ref 70–99)
HBA1C MFR BLD: 6.7 % (ref 0–5.6)
HDLC SERPL-MCNC: 62 MG/DL
LDLC SERPL CALC-MCNC: 47 MG/DL
NONHDLC SERPL-MCNC: 69 MG/DL
POTASSIUM SERPL-SCNC: 4.5 MMOL/L (ref 3.4–5.3)
PROT SERPL-MCNC: 7.2 G/DL (ref 6.4–8.3)
SODIUM SERPL-SCNC: 139 MMOL/L (ref 136–145)
TRIGL SERPL-MCNC: 112 MG/DL

## 2023-04-24 PROCEDURE — 36415 COLL VENOUS BLD VENIPUNCTURE: CPT | Performed by: INTERNAL MEDICINE

## 2023-04-24 PROCEDURE — 99214 OFFICE O/P EST MOD 30 MIN: CPT | Performed by: INTERNAL MEDICINE

## 2023-04-24 PROCEDURE — 80053 COMPREHEN METABOLIC PANEL: CPT | Performed by: INTERNAL MEDICINE

## 2023-04-24 PROCEDURE — 83036 HEMOGLOBIN GLYCOSYLATED A1C: CPT | Performed by: INTERNAL MEDICINE

## 2023-04-24 PROCEDURE — 99207 PR FOOT EXAM NO CHARGE: CPT | Performed by: INTERNAL MEDICINE

## 2023-04-24 PROCEDURE — 80061 LIPID PANEL: CPT | Performed by: INTERNAL MEDICINE

## 2023-04-24 RX ORDER — GLIPIZIDE 5 MG/1
5 TABLET, FILM COATED, EXTENDED RELEASE ORAL DAILY
Qty: 90 TABLET | Refills: 3 | Status: SHIPPED | OUTPATIENT
Start: 2023-04-24 | End: 2024-04-24

## 2023-04-24 RX ORDER — LISINOPRIL 10 MG/1
10 TABLET ORAL DAILY
Qty: 90 TABLET | Refills: 11 | Status: SHIPPED | OUTPATIENT
Start: 2023-04-24 | End: 2024-04-24

## 2023-04-24 RX ORDER — PIOGLITAZONEHYDROCHLORIDE 15 MG/1
TABLET ORAL
Qty: 90 TABLET | Refills: 3 | Status: SHIPPED | OUTPATIENT
Start: 2023-04-24 | End: 2024-04-24

## 2023-04-24 ASSESSMENT — PAIN SCALES - GENERAL: PAINLEVEL: NO PAIN (0)

## 2023-04-24 NOTE — PROGRESS NOTES
Assessment & Plan     (E11.65) Type 2 diabetes mellitus with hyperglycemia, without long-term current use of insulin (H)  (primary encounter diagnosis)  Comment:     Adequate control.  Continue jardiance/metformin/glipizide, actos.  Discussed stopping actos if a1c improves     Interested in CGM--he will check coverage for freestyle beny and dexcom and let us know   Plan: FOOT EXAM, Lipid panel reflex to direct LDL         Fasting, Comprehensive metabolic panel (BMP +         Alb, Alk Phos, ALT, AST, Total. Bili, TP),         Hemoglobin A1c, empagliflozin (JARDIANCE) 25 MG        TABS tablet, glipiZIDE (GLUCOTROL XL) 5 MG 24         hr tablet, metFORMIN (GLUCOPHAGE) 1000 MG         tablet, pioglitazone (ACTOS) 15 MG tablet          (I10) Essential hypertension with goal blood pressure less than 140/90  Comment: Adequate control.  Continue current medication.   Plan: lisinopril (ZESTRIL) 10 MG tablet          (E78.5) Hyperlipidemia with target LDL less than 70  Comment:   Plan: Adequate control.  Continue current medication.     (C61) Prostate cancer (H)  Comment:   Plan:    In remission/no recurrence to date    Jesus Cherry MD  [facility] WILLIAMS Morales is a 72 year old, presenting for the following health issues:  Diabetes (6mo f/u)        4/24/2023    10:15 AM   Additional Questions   Roomed by Leslie   Accompanied by Self     History of Present Illness       Diabetes:   He presents for follow up of diabetes.  He is checking home blood glucose a few times a month. He checks blood glucose before meals.  Blood glucose is never over 200 and never under 70. He is aware of hypoglycemia symptoms including shakiness. He has no concerns regarding his diabetes at this time.  He is not experiencing numbness or burning in feet, excessive thirst, blurry vision, weight changes or redness, sores or blisters on feet.         He eats 2-3 servings of fruits and vegetables daily.He consumes 0 sweetened  beverage(s) daily.He exercises with enough effort to increase his heart rate 9 or less minutes per day.  He exercises with enough effort to increase his heart rate 3 or less days per week.   He is taking medications regularly.     BP     130/62  4/24/2023    Lab Results   Component Value Date     11/15/2022     08/05/2021     08/24/2020     Lab Results   Component Value Date    A1C 7.3 11/15/2022    A1C 7.9 01/11/2021     Lab Results   Component Value Date    LDL 42 11/15/2022    LDL 43 08/24/2020     Lab Results   Component Value Date    MICROL 73.1 11/15/2022    MICROL 38 08/05/2021    MICROL 109 08/24/2020     Patient Active Problem List   Diagnosis     Prostate cancer (H):[place] 2010     Colon polyps     Chronic right shoulder pain- recurrent since 2013     Cervical spinal stenosis     Type 2 diabetes mellitus with hyperglycemia, without long-term current use of insulin (H)     Essential hypertension with goal blood pressure less than 140/90     Hyperlipidemia with target LDL less than 70     Coronary artery disease involving native coronary artery of native heart without angina pectoris     Diverticulosis of large intestine     Aortic stenosis, moderate     Current Outpatient Medications   Medication Sig Dispense Refill     aspirin 81 MG tablet Take 1 tablet (81 mg) by mouth daily 90 tablet 11     atorvastatin (LIPITOR) 40 MG tablet Take 1 tablet (40 mg) by mouth At Bedtime 90 tablet 3     blood glucose (NO BRAND SPECIFIED) test strip Use to test blood sugar 1 times daily or as directed. 100 each 11     blood glucose calibration (NO BRAND SPECIFIED) solution Use to calibrate blood glucose monitor as needed as directed. 1 each 11     blood glucose monitoring (NO BRAND SPECIFIED) meter device kit Use to test blood sugar 1 times daily or as directed. 1 kit 0     empagliflozin (JARDIANCE) 25 MG TABS tablet Take 1 tablet (25 mg) by mouth daily 90 tablet 3     glipiZIDE (GLUCOTROL XL) 5 MG 24 hr  tablet Take 1 tablet (5 mg) by mouth daily 90 tablet 1     lisinopril (ZESTRIL) 10 MG tablet Take 1 tablet (10 mg) by mouth daily 90 tablet 11     metFORMIN (GLUCOPHAGE) 1000 MG tablet TAKE 1 TABLET BY MOUTH TWICE DAILY WITH MEALS 180 tablet 3     metoprolol succinate ER (TOPROL XL) 25 MG 24 hr tablet Take 0.5 tablets (12.5 mg) by mouth daily 45 tablet 3     multivitamin, therapeutic (THERA-VIT) TABS tablet Take 1 tablet by mouth daily       nitroGLYcerin (NITROSTAT) 0.4 MG sublingual tablet For chest pain place 1 tablet under the tongue every 5 minutes for 3 doses. If symptoms persist 5 minutes after 1st dose call 911. 25 tablet 3     omeprazole 20 MG tablet Take 20 mg by mouth daily.       pioglitazone (ACTOS) 15 MG tablet TAKE 1 TABLET EVERY DAY 90 tablet 3     tamsulosin (FLOMAX) 0.4 MG capsule Take 1 capsule (0.4 mg) by mouth daily 90 capsule 3     thin (NO BRAND SPECIFIED) lancets Use to test blood sugar 1 times daily or as directed. 100 each 11     triamcinolone (KENALOG) 0.1 % external cream Apply topically 2 times daily as needed for irritation 80 g 1              Review of Systems         Objective    /62 (BP Location: Right arm, Patient Position: Sitting, Cuff Size: Adult Regular)   Pulse 68   Temp 97.5  F (36.4  C) (Tympanic)   Resp 16   Wt 106.6 kg (235 lb)   SpO2 97%   BMI 34.70 kg/m    Body mass index is 34.7 kg/m .  Physical Exam   GENERAL: healthy, alert and no distress  NECK: no adenopathy, no asymmetry, masses, or scars and thyroid normal to palpation  RESP: lungs clear to auscultation - no rales, rhonchi or wheezes  CV: regular rate and rhythm, normal S1 S2, no S3 or S4, no murmur  MS: no gross musculoskeletal defects noted  PSYCH: mentation appears normal, affect normal/bright  Diabetic foot exam: without skin lesions.  Monofilament testing: Present sensation

## 2023-06-13 ENCOUNTER — TRANSFERRED RECORDS (OUTPATIENT)
Dept: PEDIATRICS | Facility: CLINIC | Age: 73
End: 2023-06-13
Payer: COMMERCIAL

## 2023-06-13 LAB — RETINOPATHY: POSITIVE

## 2023-09-05 ENCOUNTER — APPOINTMENT (OUTPATIENT)
Dept: GENERAL RADIOLOGY | Facility: CLINIC | Age: 73
End: 2023-09-05
Attending: EMERGENCY MEDICINE
Payer: COMMERCIAL

## 2023-09-05 ENCOUNTER — HOSPITAL ENCOUNTER (EMERGENCY)
Facility: CLINIC | Age: 73
Discharge: HOME | End: 2023-09-05
Attending: EMERGENCY MEDICINE | Admitting: EMERGENCY MEDICINE
Payer: COMMERCIAL

## 2023-09-05 VITALS
OXYGEN SATURATION: 92 % | SYSTOLIC BLOOD PRESSURE: 150 MMHG | TEMPERATURE: 96.9 F | DIASTOLIC BLOOD PRESSURE: 76 MMHG | BODY MASS INDEX: 34.07 KG/M2 | HEIGHT: 69 IN | HEART RATE: 75 BPM | WEIGHT: 230 LBS | RESPIRATION RATE: 20 BRPM

## 2023-09-05 DIAGNOSIS — M70.61 TROCHANTERIC BURSITIS OF RIGHT HIP: ICD-10-CM

## 2023-09-05 PROCEDURE — 99283 EMERGENCY DEPT VISIT LOW MDM: CPT

## 2023-09-05 PROCEDURE — 73502 X-RAY EXAM HIP UNI 2-3 VIEWS: CPT

## 2023-09-05 RX ORDER — NAPROXEN 500 MG/1
500 TABLET ORAL 2 TIMES DAILY WITH MEALS
Qty: 14 TABLET | Refills: 0 | Status: SHIPPED | OUTPATIENT
Start: 2023-09-05 | End: 2023-09-12

## 2023-09-05 ASSESSMENT — ACTIVITIES OF DAILY LIVING (ADL): ADLS_ACUITY_SCORE: 35

## 2023-09-05 NOTE — ED TRIAGE NOTES
Pt arrives complaining of right hip pain. Unable to sleep tonight due to pain. No known injury.      Triage Assessment       Row Name 09/05/23 0437       Triage Assessment (Adult)    Airway WDL WDL       Respiratory WDL    Respiratory WDL WDL       Skin Circulation/Temperature WDL    Skin Circulation/Temperature WDL WDL       Cardiac WDL    Cardiac WDL WDL       Peripheral/Neurovascular WDL    Peripheral Neurovascular WDL WDL       Cognitive/Neuro/Behavioral WDL    Cognitive/Neuro/Behavioral WDL WDL

## 2023-09-05 NOTE — ED PROVIDER NOTES
"  History     Chief Complaint:  Hip Pain     HPI   Héctor Quispe is a 73 year old male with a history of type II diabetes, hypertension, CAD, and prostate cancer who presents with his spouse for evaluation of hip pain. The patient reports onset of right hip pain three days ago. States that he woke up around 0300 with increased pain. Notes his pain is worse with sitting and he can feel a throbbing. Adds that his pain is slightly relieved with standing and he gets temporary relief with use of Tylenol and Advil. Denies rash. Denies trauma, falls, or recent heavy lifting.     Independent Historian:   None - Patient Only    Review of External Notes:   I reviewed Care Everywhere and EPIC.     Medications:    Atorvastatin  Jardiance  Glipizide  Lisinopril  Metformin  Metoprolol  Nitroglycerin  Pioglitazone  Tamsulosin  Aspirin 81 MG  Omeprazole     Past Medical History:    Aortic stenosis  Benign prostatic hyperplasia  CAD  Diverticulosis  Hypertension  GERD  Prostate cancer  Type II diabetes     Past Surgical History:    Brachytherapy for prostate cancer  Coronary angiogram with stent placement x 2  Cystoscopy  ENT surgery      Physical Exam   Patient Vitals for the past 24 hrs:   BP Temp Temp src Pulse Resp SpO2 Height Weight   09/05/23 0438 150/76 -- -- -- -- 92 % -- --   09/05/23 0437 -- 96.9  F (36.1  C) Temporal 75 20 -- 1.753 m (5' 9\") 104.3 kg (230 lb)      Physical Exam  Nursing note and vitals reviewed.  Constitutional: Cooperative.   Cardiovascular: Normal rate, regular rhythm and normal heart sounds.  No murmur.  Pulmonary/Chest: Effort normal and breath sounds normal.  Abdominal: Soft. Normal appearance. There is no tenderness.   Musculoskeletal: Normal range of motion of right hip and lower extremity. Tenderness over the right greater trochanter.   Neurological: Alert.  Sensation in right lower extremity is normal  Skin: Skin is warm and dry.  Psychiatric: Normal mood and affect.     Emergency Department " Course   Imaging:  XR Pelvis w Hip Right 1 View   Final Result   IMPRESSION: Normal joint spaces and alignment. No fracture. Multiple prostate radiation seeds are present.      Report per radiology     Interventions:  Medications - No data to display     Assessments:  0501 I obtained history and examined the patient as noted above.     Independent Interpretation (X-rays, CTs, rhythm strip):  None    Consultations/Discussion of Management or Tests:  None      Social Determinants of Health affecting care:   None    Disposition:  The patient was discharged to home.     Impression & Plan    Medical Decision Makin-year-old admitted presents with pain in his right hip.  This localizes over the greater trochanter and is tender with palpation.  No pain with range of motion of the hip.  No fevers.  No overlying inflammation or vesicular lesions to be concern for infection or shingles.  X-ray is negative for radiographic abnormality.  Plan of care will be naproxen.  His creatinine is normal as of April.  I would like him to follow-up with [facility] orthopedics this week for further evaluation    Diagnosis:    ICD-10-CM    1. Trochanteric bursitis of right hip  M70.61            Discharge Medications:  New Prescriptions    NAPROXEN (NAPROSYN) 500 MG TABLET    Take 1 tablet (500 mg) by mouth 2 times daily (with meals) for 7 days      Scribe Disclosure:  I, Karina Coker, am serving as a scribe at 4:53 AM on 2023 to document services personally performed by Herman Livingston MD based on my observations and the provider's statements to me.     2023   Herman Livingston MD Amdahl, John, MD  23 0650

## 2023-09-07 ENCOUNTER — TRANSFERRED RECORDS (OUTPATIENT)
Dept: PEDIATRICS | Facility: CLINIC | Age: 73
End: 2023-09-07
Payer: COMMERCIAL

## 2023-10-05 ENCOUNTER — HOSPITAL ENCOUNTER (OUTPATIENT)
Dept: CARDIOLOGY | Facility: CLINIC | Age: 73
Discharge: HOME | End: 2023-10-05
Attending: INTERNAL MEDICINE | Admitting: INTERNAL MEDICINE
Payer: COMMERCIAL

## 2023-10-05 DIAGNOSIS — I35.0 NONRHEUMATIC AORTIC VALVE STENOSIS: ICD-10-CM

## 2023-10-05 LAB — LVEF ECHO: NORMAL

## 2023-10-05 PROCEDURE — 999N000208 ECHOCARDIOGRAM COMPLETE

## 2023-10-05 PROCEDURE — 93306 TTE W/DOPPLER COMPLETE: CPT | Mod: 26 | Performed by: INTERNAL MEDICINE

## 2023-10-05 PROCEDURE — 255N000002 HC RX 255 OP 636: Performed by: INTERNAL MEDICINE

## 2023-10-05 RX ADMIN — HUMAN ALBUMIN MICROSPHERES AND PERFLUTREN 3 ML: 10; .22 INJECTION, SOLUTION INTRAVENOUS at 13:30

## 2023-10-24 ENCOUNTER — OFFICE VISIT (OUTPATIENT)
Dept: PEDIATRICS | Facility: CLINIC | Age: 73
End: 2023-10-24
Payer: COMMERCIAL

## 2023-10-24 VITALS
TEMPERATURE: 98 F | OXYGEN SATURATION: 95 % | RESPIRATION RATE: 16 BRPM | HEART RATE: 90 BPM | WEIGHT: 233 LBS | BODY MASS INDEX: 34.41 KG/M2 | DIASTOLIC BLOOD PRESSURE: 60 MMHG | SYSTOLIC BLOOD PRESSURE: 120 MMHG

## 2023-10-24 DIAGNOSIS — I25.10 CORONARY ARTERY DISEASE INVOLVING NATIVE CORONARY ARTERY OF NATIVE HEART WITHOUT ANGINA PECTORIS: ICD-10-CM

## 2023-10-24 DIAGNOSIS — E11.65 TYPE 2 DIABETES MELLITUS WITH HYPERGLYCEMIA, WITHOUT LONG-TERM CURRENT USE OF INSULIN (H): Primary | ICD-10-CM

## 2023-10-24 DIAGNOSIS — E78.5 HYPERLIPIDEMIA WITH TARGET LDL LESS THAN 70: ICD-10-CM

## 2023-10-24 DIAGNOSIS — I10 ESSENTIAL HYPERTENSION WITH GOAL BLOOD PRESSURE LESS THAN 140/90: ICD-10-CM

## 2023-10-24 LAB
ALBUMIN UR-MCNC: ABNORMAL MG/DL
ANION GAP SERPL CALCULATED.3IONS-SCNC: 12 MMOL/L (ref 7–15)
APPEARANCE UR: CLEAR
BACTERIA #/AREA URNS HPF: ABNORMAL /HPF
BASOPHILS # BLD AUTO: 0 10E3/UL (ref 0–0.2)
BASOPHILS NFR BLD AUTO: 1 %
BILIRUB UR QL STRIP: NEGATIVE
BUN SERPL-MCNC: 19.4 MG/DL (ref 8–23)
CALCIUM SERPL-MCNC: 9.4 MG/DL (ref 8.8–10.2)
CHLORIDE SERPL-SCNC: 99 MMOL/L (ref 98–107)
COLOR UR AUTO: YELLOW
CREAT SERPL-MCNC: 0.87 MG/DL (ref 0.67–1.17)
CREAT UR-MCNC: 67 MG/DL
DEPRECATED HCO3 PLAS-SCNC: 26 MMOL/L (ref 22–29)
EGFRCR SERPLBLD CKD-EPI 2021: 90 ML/MIN/1.73M2
EOSINOPHIL # BLD AUTO: 0.1 10E3/UL (ref 0–0.7)
EOSINOPHIL NFR BLD AUTO: 3 %
ERYTHROCYTE [DISTWIDTH] IN BLOOD BY AUTOMATED COUNT: 14.2 % (ref 10–15)
GLUCOSE SERPL-MCNC: 137 MG/DL (ref 70–99)
GLUCOSE UR STRIP-MCNC: >=1000 MG/DL
HBA1C MFR BLD: 7.4 % (ref 0–5.6)
HCT VFR BLD AUTO: 38.7 % (ref 40–53)
HGB BLD-MCNC: 12.7 G/DL (ref 13.3–17.7)
HGB UR QL STRIP: NEGATIVE
IMM GRANULOCYTES # BLD: 0 10E3/UL
IMM GRANULOCYTES NFR BLD: 0 %
KETONES UR STRIP-MCNC: NEGATIVE MG/DL
LEUKOCYTE ESTERASE UR QL STRIP: NEGATIVE
LYMPHOCYTES # BLD AUTO: 1.1 10E3/UL (ref 0.8–5.3)
LYMPHOCYTES NFR BLD AUTO: 25 %
MCH RBC QN AUTO: 31 PG (ref 26.5–33)
MCHC RBC AUTO-ENTMCNC: 32.8 G/DL (ref 31.5–36.5)
MCV RBC AUTO: 94 FL (ref 78–100)
MICROALBUMIN UR-MCNC: 75.1 MG/L
MICROALBUMIN/CREAT UR: 112.09 MG/G CR (ref 0–17)
MONOCYTES # BLD AUTO: 0.5 10E3/UL (ref 0–1.3)
MONOCYTES NFR BLD AUTO: 11 %
NEUTROPHILS # BLD AUTO: 2.7 10E3/UL (ref 1.6–8.3)
NEUTROPHILS NFR BLD AUTO: 61 %
NITRATE UR QL: NEGATIVE
PH UR STRIP: 5.5 [PH] (ref 5–7)
PLATELET # BLD AUTO: 262 10E3/UL (ref 150–450)
POTASSIUM SERPL-SCNC: 4.6 MMOL/L (ref 3.4–5.3)
RBC # BLD AUTO: 4.1 10E6/UL (ref 4.4–5.9)
RBC #/AREA URNS AUTO: ABNORMAL /HPF
SODIUM SERPL-SCNC: 137 MMOL/L (ref 135–145)
SP GR UR STRIP: 1.01 (ref 1–1.03)
SQUAMOUS #/AREA URNS AUTO: ABNORMAL /LPF
TSH SERPL DL<=0.005 MIU/L-ACNC: 3.67 UIU/ML (ref 0.3–4.2)
UROBILINOGEN UR STRIP-ACNC: 0.2 E.U./DL
WBC # BLD AUTO: 4.4 10E3/UL (ref 4–11)
WBC #/AREA URNS AUTO: ABNORMAL /HPF

## 2023-10-24 PROCEDURE — 84443 ASSAY THYROID STIM HORMONE: CPT | Performed by: INTERNAL MEDICINE

## 2023-10-24 PROCEDURE — 80048 BASIC METABOLIC PNL TOTAL CA: CPT | Performed by: INTERNAL MEDICINE

## 2023-10-24 PROCEDURE — 85025 COMPLETE CBC W/AUTO DIFF WBC: CPT | Performed by: INTERNAL MEDICINE

## 2023-10-24 PROCEDURE — 83036 HEMOGLOBIN GLYCOSYLATED A1C: CPT | Performed by: INTERNAL MEDICINE

## 2023-10-24 PROCEDURE — 82570 ASSAY OF URINE CREATININE: CPT | Performed by: INTERNAL MEDICINE

## 2023-10-24 PROCEDURE — 81001 URINALYSIS AUTO W/SCOPE: CPT | Performed by: INTERNAL MEDICINE

## 2023-10-24 PROCEDURE — 99214 OFFICE O/P EST MOD 30 MIN: CPT | Performed by: INTERNAL MEDICINE

## 2023-10-24 PROCEDURE — 36415 COLL VENOUS BLD VENIPUNCTURE: CPT | Performed by: INTERNAL MEDICINE

## 2023-10-24 PROCEDURE — 82043 UR ALBUMIN QUANTITATIVE: CPT | Performed by: INTERNAL MEDICINE

## 2023-10-24 RX ORDER — ATORVASTATIN CALCIUM 40 MG/1
40 TABLET, FILM COATED ORAL AT BEDTIME
Qty: 90 TABLET | Refills: 11 | Status: SHIPPED | OUTPATIENT
Start: 2023-10-24 | End: 2024-02-15

## 2023-10-24 RX ORDER — RESPIRATORY SYNCYTIAL VIRUS VACCINE 120MCG/0.5
0.5 KIT INTRAMUSCULAR ONCE
Qty: 1 EACH | Refills: 0 | Status: CANCELLED | OUTPATIENT
Start: 2023-10-24 | End: 2023-10-24

## 2023-10-24 RX ORDER — TAMSULOSIN HYDROCHLORIDE 0.4 MG/1
0.4 CAPSULE ORAL DAILY
Qty: 90 CAPSULE | Refills: 11 | Status: SHIPPED | OUTPATIENT
Start: 2023-10-24 | End: 2024-04-24

## 2023-10-24 RX ORDER — METOPROLOL SUCCINATE 25 MG/1
12.5 TABLET, EXTENDED RELEASE ORAL DAILY
Qty: 45 TABLET | Refills: 11 | Status: SHIPPED | OUTPATIENT
Start: 2023-10-24 | End: 2024-05-17

## 2023-10-24 ASSESSMENT — PAIN SCALES - GENERAL: PAINLEVEL: NO PAIN (0)

## 2023-10-24 NOTE — PROGRESS NOTES
Assessment & Plan     (E11.65) Type 2 diabetes mellitus with hyperglycemia, without long-term current use of insulin (H)  (primary encounter diagnosis)  Comment: well controlled, due for labwork.  Current rx: Metformin 2000, Jardiance 25, glipizide 5, actos 15.  Rtc 6mo.   Plan: HEMOGLOBIN A1C, Albumin Random Urine         Quantitative with Creat Ratio, tamsulosin         (FLOMAX) 0.4 MG capsule, TSH with free T4         reflex, Basic metabolic panel  (Ca, Cl, CO2,         Creat, Gluc, K, Na, BUN), UA with Microscopic         reflex to Culture - lab collect          (I25.10) Coronary artery disease involving native coronary artery of native heart without angina pectoris  Comment:   stable, continue statin/ASA/metoprolol  Plan: metoprolol succinate ER (TOPROL XL) 25 MG 24 hr        tablet, CBC with platelets and differential          (I10) Essential hypertension with goal blood pressure less than 140/90  Comment:   Plan: Adequate control.  Continue current medications    (E78.5) Hyperlipidemia with target LDL less than 70  Comment:   Lab Results   Component Value Date    LDL 47 04/24/2023    LDL 43 08/24/2020    Plan: atorvastatin (LIPITOR) 40 MG tablet        Well controlled    Jesus Cherry MD  [facility] WILLIAMS Morales is a 73 year old, presenting for the following health issues:  Follow Up        10/24/2023     7:52 AM   Additional Questions   Roomed by Gita Cazares CMA   Accompanied by TYRELL       History of Present Illness       Diabetes:   He presents for follow up of diabetes.  He is checking home blood glucose a few times a month.   He checks blood glucose before meals.  Blood glucose is never over 200 and never under 70. He is aware of hypoglycemia symptoms including shakiness.    He has no concerns regarding his diabetes at this time.   He is not experiencing numbness or burning in feet, excessive thirst, blurry vision, weight changes or redness, sores or blisters on feet.            He eats 2-3 servings of fruits and vegetables daily.He consumes 0 sweetened beverage(s) daily.He exercises with enough effort to increase his heart rate 10 to 19 minutes per day.  He exercises with enough effort to increase his heart rate 4 days per week.   He is taking medications regularly.     BP     120/60  10/24/2023    Lab Results   Component Value Date     04/24/2023     08/05/2021     08/24/2020     Lab Results   Component Value Date    A1C 6.7 04/24/2023    A1C 7.9 01/11/2021     Lab Results   Component Value Date    LDL 47 04/24/2023    LDL 43 08/24/2020     Lab Results   Component Value Date    MICROL 73.1 11/15/2022    MICROL 38 08/05/2021    MICROL 109 08/24/2020     Patient Active Problem List   Diagnosis    Prostate cancer (H):[place] 2010    Colon polyps    Chronic right shoulder pain- recurrent since 2013    Cervical spinal stenosis    Type 2 diabetes mellitus with hyperglycemia, without long-term current use of insulin (H)    Essential hypertension with goal blood pressure less than 140/90    Hyperlipidemia with target LDL less than 70    Coronary artery disease involving native coronary artery of native heart without angina pectoris    Diverticulosis of large intestine    Aortic stenosis, moderate     Current Outpatient Medications   Medication Sig Dispense Refill    aspirin 81 MG tablet Take 1 tablet (81 mg) by mouth daily 90 tablet 11    atorvastatin (LIPITOR) 40 MG tablet Take 1 tablet (40 mg) by mouth at bedtime 90 tablet 11    empagliflozin (JARDIANCE) 25 MG TABS tablet Take 1 tablet (25 mg) by mouth daily 90 tablet 3    glipiZIDE (GLUCOTROL XL) 5 MG 24 hr tablet Take 1 tablet (5 mg) by mouth daily 90 tablet 3    lisinopril (ZESTRIL) 10 MG tablet Take 1 tablet (10 mg) by mouth daily 90 tablet 11    metFORMIN (GLUCOPHAGE) 1000 MG tablet TAKE 1 TABLET BY MOUTH TWICE DAILY WITH MEALS 180 tablet 3    metoprolol succinate ER (TOPROL XL) 25 MG 24 hr tablet Take 0.5 tablets  (12.5 mg) by mouth daily 45 tablet 11    multivitamin, therapeutic (THERA-VIT) TABS tablet Take 1 tablet by mouth daily      nitroGLYcerin (NITROSTAT) 0.4 MG sublingual tablet For chest pain place 1 tablet under the tongue every 5 minutes for 3 doses. If symptoms persist 5 minutes after 1st dose call 911. 25 tablet 3    omeprazole 20 MG tablet Take 20 mg by mouth daily.      pioglitazone (ACTOS) 15 MG tablet TAKE 1 TABLET EVERY DAY 90 tablet 3    tamsulosin (FLOMAX) 0.4 MG capsule Take 1 capsule (0.4 mg) by mouth daily 90 capsule 11    triamcinolone (KENALOG) 0.1 % external cream Apply topically 2 times daily as needed for irritation 80 g 1          Objective    /60 (BP Location: Right arm, Patient Position: Sitting, Cuff Size: Adult Regular)   Pulse 90   Temp 98  F (36.7  C) (Tympanic)   Resp 16   Wt 105.7 kg (233 lb)   SpO2 95%   BMI 34.41 kg/m    Body mass index is 34.41 kg/m .  Physical Exam   GENERAL: healthy, alert and no distress  NECK: no adenopathy, no asymmetry, masses, or scars and thyroid normal to palpation  RESP: lungs clear to auscultation - no rales, rhonchi or wheezes  CV: regular rate and rhythm, normal S1 S2, no S3 or S4, no murmur  MS: no gross musculoskeletal defects noted, no edema  SKIN: no suspicious lesions or rashes  PSYCH: mentation appears normal, affect normal/bright

## 2023-10-25 ENCOUNTER — OFFICE VISIT (OUTPATIENT)
Dept: CARDIOLOGY | Facility: CLINIC | Age: 73
End: 2023-10-25
Payer: COMMERCIAL

## 2023-10-25 VITALS
HEIGHT: 69 IN | OXYGEN SATURATION: 97 % | WEIGHT: 232.7 LBS | HEART RATE: 69 BPM | DIASTOLIC BLOOD PRESSURE: 66 MMHG | BODY MASS INDEX: 34.47 KG/M2 | SYSTOLIC BLOOD PRESSURE: 124 MMHG

## 2023-10-25 DIAGNOSIS — I20.0 ACCELERATING ANGINA (H): Primary | ICD-10-CM

## 2023-10-25 DIAGNOSIS — I35.0 AORTIC STENOSIS, MODERATE: ICD-10-CM

## 2023-10-25 DIAGNOSIS — I25.10 CORONARY ARTERY DISEASE INVOLVING NATIVE CORONARY ARTERY OF NATIVE HEART WITHOUT ANGINA PECTORIS: ICD-10-CM

## 2023-10-25 PROCEDURE — 99215 OFFICE O/P EST HI 40 MIN: CPT | Performed by: INTERNAL MEDICINE

## 2023-10-25 RX ORDER — NITROGLYCERIN 0.4 MG/1
TABLET SUBLINGUAL
Qty: 25 TABLET | Refills: 3 | Status: SHIPPED | OUTPATIENT
Start: 2023-10-25

## 2023-10-25 NOTE — LETTER
10/25/2023    Jesus Cherry MD  [address] [place] [address]    RE: Héctor PEREZ Charlesrolfosman       Dear Colleague,     I had the pleasure of seeing Héctor [place] in the [facility].      Cardiology Clinic Progress Note:    October 25, 2023   Patient Name: Héctor [place]  Patient MRN: [id number]     Consult indication: chest pain    HPI:    I had the opportunity to see patient Héctor [place] in cardiology clinic for a follow up visit. Patient is followed by our colleague Jesus Cherry MD with Primary Care.     As you know, patient is a pleasant 73-year-old male with a past medical history significant for coronary artery disease with NSTEMI status post PCI (BMS to RCA 1996, BMS x2 to RCA near prior stent 2002), hypertension, hyperlipidemia, diabetes, obesity, aortic stenosis, who presents for follow up.     Patient has a remote history of coronary artery disease.  In 1996, he presented with unstable angina resulting in a small NSTEMI.  He underwent cardiac catheterization with bare-metal stent placement to the RCA.  In 2002, he had another NSTEMI, for which he had 2 overlapping 4 mm bare-metal stents in the RCA near the prior stent.     Since then, he had been doing reasonably well, though he does note that over the past year or so, he has had worsening shortness of breath with exertion.  He denies any chest pain or chest pressure.  He denies any symptoms of orthopnea/PND or abnormal lower extremity swelling.     He was seen by his primary care physician, Dr. Cherry, and on exam was noted to have a cardiac murmur.  Patient underwent a TTE on 8/6/2021 that demonstrated normal biventricular function, moderate aortic stenosis, Vmax 2.4 m/s, mean gradient 13 mmHg, ARMANDO 1.4cm2, DI 0.38. SVi 35 cc/m2.     I had seen him in clinic for a consultation on 11/17/2021.  At that time, he was experiencing some dyspnea on exertion.  For further evaluation patient was assessed with a cardiac stress MRI  12/7/2021 that demonstrated normal biventricular function, moderate aortic stenosis, small sized area of mild mid inferolateral ischemia.  We discussed options for further evaluation/management, cardiac catheterization versus continued medical management.  Patient wanted to proceed with medical management.  We also increased atorvastatin to 40 mg nightly.      Since our last visit 10/5/2022, patient reports that he had been doing reasonably well, until recently.  Over the past couple of months he reports progressive dyspnea on exertion, with significant fatigue and lack of energy engaging in activities that previously had no issue with, such as working in his yard.  Walking up stairs has resulted in more fatigue and generalized weakness.  Symptoms have been progressive over the past several weeks.  He also had an episode of chest discomfort in the middle the night that was resolved with sublingual nitroglycerin, though none since then.  He denies symptoms of palpitations, no symptoms of orthopnea/PND, presyncope/syncope.    TTE 10/5/2023  Interpretation Summary     Moderate valvular aortic stenosis.  The calculated aortic valve area is 1.1cm2.  The mean AoV pressure gradient is 22mmHg.  The visual ejection fraction is 60-65%.  The study was technically difficult. Contrast was used without apparent  complications. Compared to prior study, there is no significant change.    Assessment and Plan/Recommendations:    # Progressive dyspnea on exertion, chest pain, concerning for accelerating angina. Has a known history of CAD with NSTEMI status post PCI (BMS to RCA 1996, BMS x2 to RCA near prior stent 2002), CMR stress test 12/2021 demonstrates small sized area of mild mid inferolateral ischemia that had been medically managed.   # Moderate aortic stenosis  # HTN, well controlled  # HL, LDL at goal <70  # DM2  # Obesity    -Discussed options for further evaluation management.  Given the progressive nature of his symptoms  in the setting of his prior cardiac history, discussed transfer to ED for inpatient evaluation/management, patient declines at this time.  As such, we will proceed with urgent cardiac catheterization/coronary angiography and possible intervention.   - Discussed the risks of cardiac catheterization/angiography +/- PCI that include but are not limited to the risk of stroke, heart attack, death, cardiac injury, emergent intervention such as stenting or bypass, contrast induced allergic reaction, renal dysfunction (including risks of temporary or permanent dialysis), and complications related to sedation and respiratory/pulmonary compromise, vascular complications (including bleeding and blood transfusion). Patient denies any major active bleeding issues and is willing to take and comply with dual antiplatelet therapy and understands the associated bleeding risks. Patient understands the overall risks of the procedure and wishes to proceed.  - Follow-up with cardiology RICKY post cardiac catheterization  - Continue current cardiac regimen of aspirin, metoprolol succinate, lisinopril, atorvastatin, sublingual nitroglycerin as needed  - TTE in 1 year with follow-up  - Discussed with Pt that they should seek medical attention if they experience any future episodes of chest pain/discomfort, worsening exertional capacity, dyspnea, or other concerning symptoms, and they voiced understanding      Thank you for allowing our team to participate in the care of Héctor [place].  Please do not hesitate to call or page me with any questions or concerns.    Sincerely,     Zac Cabrera MD, [facility]  Cardiology  Text Page   October 25, 2023    Voice recognition software utilized.     Past Medical History:     Past Medical History:   Diagnosis Date    Aortic stenosis     BPH (benign prostatic hyperplasia)     Coronary artery disease 01/06/2016    Diverticulosis of large intestine     Essential hypertension 01/06/2016     Gastroesophageal reflux disease     h/o 2019 novel coronavirus disease (COVID-19) 12/18/2020    Prostate cancer     Type 2 diabetes mellitus with diabetic nephropathy 01/06/2016        Past Surgical History:   Past Surgical History:   Procedure Laterality Date    Brachytherapy for prostate cancer  2011    Coronary angiogram with stent placement x2.      CYSTOSCOPY      ENT SURGERY         Medications (outpatient):  Current Outpatient Medications   Medication Sig Dispense Refill    aspirin 81 MG tablet Take 1 tablet (81 mg) by mouth daily 90 tablet 11    atorvastatin (LIPITOR) 40 MG tablet Take 1 tablet (40 mg) by mouth at bedtime 90 tablet 11    empagliflozin (JARDIANCE) 25 MG TABS tablet Take 1 tablet (25 mg) by mouth daily 90 tablet 3    glipiZIDE (GLUCOTROL XL) 5 MG 24 hr tablet Take 1 tablet (5 mg) by mouth daily 90 tablet 3    lisinopril (ZESTRIL) 10 MG tablet Take 1 tablet (10 mg) by mouth daily 90 tablet 11    metFORMIN (GLUCOPHAGE) 1000 MG tablet TAKE 1 TABLET BY MOUTH TWICE DAILY WITH MEALS 180 tablet 3    metoprolol succinate ER (TOPROL XL) 25 MG 24 hr tablet Take 0.5 tablets (12.5 mg) by mouth daily 45 tablet 11    multivitamin, therapeutic (THERA-VIT) TABS tablet Take 1 tablet by mouth daily      nitroGLYcerin (NITROSTAT) 0.4 MG sublingual tablet For chest pain place 1 tablet under the tongue every 5 minutes for 3 doses. If symptoms persist 5 minutes after 1st dose call 911. 25 tablet 3    omeprazole 20 MG tablet Take 20 mg by mouth daily.      pioglitazone (ACTOS) 15 MG tablet TAKE 1 TABLET EVERY DAY 90 tablet 3    tamsulosin (FLOMAX) 0.4 MG capsule Take 1 capsule (0.4 mg) by mouth daily 90 capsule 11    triamcinolone (KENALOG) 0.1 % external cream Apply topically 2 times daily as needed for irritation 80 g 1       Allergies:  No Known Allergies    Social History:   History   Drug Use No      History   Smoking Status    Former    Types: Cigarettes    Quit date: 8/2/2002   Smokeless Tobacco    Never  "    Social History    Substance and Sexual Activity      Alcohol use: Yes        Alcohol/week: 3.0 standard drinks of alcohol        Types: 3 Cans of beer per week        Comment: few beers a day       Family History:  Family History   Problem Relation Age of Onset    Alzheimer Disease Mother     Cancer Father     Diabetes No family hx of     Coronary Artery Disease No family hx of     Hypertension No family hx of     Hyperlipidemia No family hx of     Cerebrovascular Disease No family hx of     Breast Cancer No family hx of     Colon Cancer No family hx of     Prostate Cancer No family hx of     Other Cancer No family hx of     Depression No family hx of     Anxiety Disorder No family hx of     Mental Illness No family hx of     Substance Abuse No family hx of     Anesthesia Reaction No family hx of     Asthma No family hx of     Osteoporosis No family hx of     Genetic Disorder No family hx of     Thyroid Disease No family hx of     Obesity No family hx of     Unknown/Adopted No family hx of        Review of Systems:   A complete review of systems was negative except as mentioned in the History of Present Illness.     Objective & Physical Exam:  /66   Pulse 69   Ht 1.753 m (5' 9\")   Wt 105.6 kg (232 lb 11.2 oz)   SpO2 97%   BMI 34.36 kg/m    Wt Readings from Last 2 Encounters:   10/25/23 105.6 kg (232 lb 11.2 oz)   10/24/23 105.7 kg (233 lb)     Body mass index is 34.36 kg/m .   Body surface area is 2.27 meters squared.    Constitutional: appears stated age, in no apparent distress, appears to be well nourished  Head: normocephalic, atraumatic  Neck: supple, trachea midline, no bruit bilaterally   Pulmonary: clear to auscultation bilaterally, no wheezes, no rales, no increased work of breathing  Cardiovascular: JVP normal, regular rate, regular rhythm, 2/6 DEBBIE at the RUSB, no lower extremity edema  Gastrointestinal: no guarding, non-rigid   Neurologic: awake, alert, moves all extremities  Skin: no " jaundice, warm on limited exam  Psychiatric: affect is normal, answers questions appropriately, oriented to self and place    Data reviewed:  Lab Results   Component Value Date    WBC 4.4 10/24/2023    WBC 4.0 05/06/2015    RBC 4.10 (L) 10/24/2023    RBC 4.04 (L) 05/06/2015    HGB 12.7 (L) 10/24/2023    HGB 12.9 (L) 05/06/2015    HCT 38.7 (L) 10/24/2023    HCT 39.3 (L) 05/06/2015    MCV 94 10/24/2023    MCV 97 05/06/2015    MCH 31.0 10/24/2023    MCH 31.9 05/06/2015    MCHC 32.8 10/24/2023    MCHC 32.8 05/06/2015    RDW 14.2 10/24/2023    RDW 13.2 05/06/2015     10/24/2023     05/06/2015     Sodium   Date Value Ref Range Status   10/24/2023 137 135 - 145 mmol/L Final     Comment:     Reference intervals for this test were updated on 09/26/2023 to more accurately reflect our healthy population. There may be differences in the flagging of prior results with similar values performed with this method. Interpretation of those prior results can be made in the context of the updated reference intervals.    08/24/2020 138 133 - 144 mmol/L Final     Potassium   Date Value Ref Range Status   10/24/2023 4.6 3.4 - 5.3 mmol/L Final   08/05/2021 4.6 3.4 - 5.3 mmol/L Final   08/24/2020 4.1 3.4 - 5.3 mmol/L Final     Chloride   Date Value Ref Range Status   10/24/2023 99 98 - 107 mmol/L Final   08/05/2021 103 94 - 109 mmol/L Final   08/24/2020 105 94 - 109 mmol/L Final     Carbon Dioxide   Date Value Ref Range Status   08/24/2020 26 20 - 32 mmol/L Final     Carbon Dioxide (CO2)   Date Value Ref Range Status   10/24/2023 26 22 - 29 mmol/L Final   08/05/2021 25 20 - 32 mmol/L Final     Anion Gap   Date Value Ref Range Status   10/24/2023 12 7 - 15 mmol/L Final   08/05/2021 7 3 - 14 mmol/L Final   08/24/2020 7 3 - 14 mmol/L Final     Glucose   Date Value Ref Range Status   10/24/2023 137 (H) 70 - 99 mg/dL Final   08/05/2021 140 (H) 70 - 99 mg/dL Final   08/24/2020 146 (H) 70 - 99 mg/dL Final     Comment:     Fasting  specimen     Urea Nitrogen   Date Value Ref Range Status   10/24/2023 19.4 8.0 - 23.0 mg/dL Final   08/05/2021 23 7 - 30 mg/dL Final   08/24/2020 24 7 - 30 mg/dL Final     Creatinine   Date Value Ref Range Status   10/24/2023 0.87 0.67 - 1.17 mg/dL Final   08/24/2020 0.95 0.66 - 1.25 mg/dL Final     GFR Estimate   Date Value Ref Range Status   10/24/2023 90 >60 mL/min/1.73m2 Final   08/24/2020 79 >60 mL/min/[1.73_m2] Final     Comment:     Non  GFR Calc  Starting 12/18/2018, serum creatinine based estimated GFR (eGFR) will be   calculated using the Chronic Kidney Disease Epidemiology Collaboration   (CKD-EPI) equation.       Calcium   Date Value Ref Range Status   10/24/2023 9.4 8.8 - 10.2 mg/dL Final   08/24/2020 8.5 8.5 - 10.1 mg/dL Final     Bilirubin Total   Date Value Ref Range Status   04/24/2023 0.4 <=1.2 mg/dL Final   08/24/2020 0.4 0.2 - 1.3 mg/dL Final     Alkaline Phosphatase   Date Value Ref Range Status   04/24/2023 65 40 - 129 U/L Final   08/24/2020 69 40 - 150 U/L Final     ALT   Date Value Ref Range Status   04/24/2023 24 10 - 50 U/L Final   08/24/2020 30 0 - 70 U/L Final     AST   Date Value Ref Range Status   04/24/2023 26 10 - 50 U/L Final     Comment:     Specimen is hemolyzed which can falsely elevate AST. Analysis of a non-hemolyzed specimen may result in a lower value.    Specimen is hemolyzed which can falsely elevate AST. Analysis of a non-hemolyzed specimen may result in a lower value.   08/24/2020 15 0 - 45 U/L Final     Recent Labs   Lab Test 04/24/23  1106 11/15/22  0923   CHOL 131 138   HDL 62 74   LDL 47 42   TRIG 112 108      Lab Results   Component Value Date    A1C 7.4 10/24/2023    A1C 6.7 04/24/2023    A1C 7.3 11/15/2022    A1C 6.8 08/05/2021    A1C 7.9 01/11/2021    A1C 7.3 08/24/2020    A1C 8.4 10/31/2019    A1C 7.5 05/06/2019    A1C 7.2 11/13/2018        Recent Results (from the past 4320 hour(s))   Echocardiogram Complete   Result Value    LVEF  60-65%     Narrative    [id number]  [facility]ly [facility]  [id number]  [id number]^CHUCHO^LIDIA^TIFFANI     [facility]  [facility]  Echocardiography [facility]  [address]  [address]  [place] [address]  Phone [phone]  Fax [phone]     Name: LUDMILA CODY  MRN: [id number]  : 1950  Study Date: 10/05/2023 12:49 PM  Age: 73 yrs  Gender: Male  Patient Location: [facility]  Reason For Study: Nonrheumatic aortic valve stenosis  Ordering Physician: LIDIA RANKIN  Referring Physician: LIDIA RANKIN  Performed By: Alicia Curtis RDCS     BSA: 2.2 m2  Height: 69 in  Weight: 230 lb  HR: 82  BP: 158/71 mmHg  ______________________________________________________________________________  Procedure  Complete Echo Adult. Optison (NDC #[id number]) given intravenously.     ______________________________________________________________________________  Interpretation Summary     Moderate valvular aortic stenosis.  The calculated aortic valve area is 1.1cm2.  The mean AoV pressure gradient is 22mmHg.  The visual ejection fraction is 60-65%.  The study was technically difficult. Contrast was used without apparent  complications. Compared to prior study, there is no significant change.  ______________________________________________________________________________  Left Ventricle  The left ventricle is normal in size. There is normal left ventricular wall  thickness. The visual ejection fraction is 60-65%. Left ventricular diastolic  function is normal. No regional wall motion abnormalities noted.     Right Ventricle  The right ventricle is normal in structure, function and size.     Atria  Normal left atrial size. Right atrial size is normal. There is no color  Doppler evidence of an atrial shunt.     Mitral Valve  The mitral valve is normal in structure and function.     Tricuspid Valve  The tricuspid valve is normal in structure and function. Right ventricle  systolic pressure estimate normal. There is trace  tricuspid regurgitation.     Aortic Valve  Moderate valvular aortic stenosis. The calculated aortic valve area is 1.1cm2.  The mean AoV pressure gradient is 22mmHg.     Pulmonic Valve  The pulmonic valve is not well seen, but is grossly normal.     Vessels  Normal size aorta. Normal size ascending aorta. The inferior vena cava is  normal.     Pericardium  The pericardium appears normal.     Rhythm  Sinus rhythm was noted.     ______________________________________________________________________________  MMode/2D Measurements & Calculations  IVSd: 1.2 cm  LVIDd: 4.2 cm  LVIDs: 2.1 cm  LVPWd: 1.3 cm  FS: 49.5 %  LV mass(C)d: 195.3 grams  LV mass(C)dI: 89.1 grams/m2  Ao root diam: 3.6 cm  LA dimension: 4.4 cm     asc Aorta Diam: 3.2 cm  LA/Ao: 1.2  LVOT diam: 2.2 cm  LVOT area: 3.7 cm2  Ao root diam index Ht(cm/m): 2.0  Ao root diam index BSA (cm/m2): 1.6  Asc Ao diam index BSA (cm/m2): 1.5  Asc Ao diam index Ht(cm/m): 1.8  RWT: 0.63     Doppler Measurements & Calculations  MV E max jorge luis: 68.1 cm/sec  MV A max jorge luis: 90.4 cm/sec  MV E/A: 0.75  MV dec time: 0.25 sec  Ao V2 max: 314.0 cm/sec  Ao max P.4 mmHg  Ao V2 mean: 219.7 cm/sec  Ao mean P.4 mmHg  Ao V2 VTI: 61.1 cm  ARMANDO(I,D): 1.1 cm2  ARMANDO(V,D): 1.2 cm2  LV V1 max P.2 mmHg  LV V1 max: 103.0 cm/sec  LV V1 VTI: 17.8 cm  SV(LVOT): 66.5 ml  SI(LVOT): 30.4 ml/m2  AV Jorge Luis Ratio (DI): 0.33  ARMANDO Index (cm2/m2): 0.50     E/E' av.7  Lateral E/e': 8.7  Medial E/e': 10.8  RV S Jorge Luis: 15.6 cm/sec     ______________________________________________________________________________  Report approved by: Marisol Salinas 10/05/2023 03:57 PM              Thank you for allowing me to participate in the care of your patient.      Sincerely,     Zac Cabrera MD     [facility]  cc:   Zac Cabrera MD  [address], [address]  [place] [address]

## 2023-10-25 NOTE — PATIENT INSTRUCTIONS
October 25, 2023    Thank you for allowing our Cardiology team to participate in your care.     Please note the following changes to your heart treatment plan:     Medication changes:   - none    Tests to be done:  - cardiac catheterization/coronary angiogram and possible intervention  - TTE in 1 year    Follow up:  - Follow up in about 1 month with cardiology RICKY and with me in 1 year, or sooner as needed.      For scheduling, please call [phone].      Please contact our team through [email or web address] or our Nurse Team Voicemail service [phone], or the General Clinic [phone] for any questions or concerns.     If you are having a medical emergency, please call [phone].     Sincerely,    Zac Cabrera MD, FAC  Cardiology    [facility]

## 2023-10-25 NOTE — PROGRESS NOTES
Cardiology Clinic Progress Note:    October 25, 2023   Patient Name: Héctor Quispe  Patient MRN: [id number]     Consult indication: chest pain    HPI:    I had the opportunity to see patient Héctor Quispe in cardiology clinic for a follow up visit. Patient is followed by our colleague Jesus Cherry MD with Primary Care.     As you know, patient is a pleasant 73-year-old male with a past medical history significant for coronary artery disease with NSTEMI status post PCI (BMS to RCA 1996, BMS x2 to RCA near prior stent 2002), hypertension, hyperlipidemia, diabetes, obesity, aortic stenosis, who presents for follow up.     Patient has a remote history of coronary artery disease.  In 1996, he presented with unstable angina resulting in a small NSTEMI.  He underwent cardiac catheterization with bare-metal stent placement to the RCA.  In 2002, he had another NSTEMI, for which he had 2 overlapping 4 mm bare-metal stents in the RCA near the prior stent.     Since then, he had been doing reasonably well, though he does note that over the past year or so, he has had worsening shortness of breath with exertion.  He denies any chest pain or chest pressure.  He denies any symptoms of orthopnea/PND or abnormal lower extremity swelling.     He was seen by his primary care physician, Dr. Cherry, and on exam was noted to have a cardiac murmur.  Patient underwent a TTE on 8/6/2021 that demonstrated normal biventricular function, moderate aortic stenosis, Vmax 2.4 m/s, mean gradient 13 mmHg, ARMANDO 1.4cm2, DI 0.38. SVi 35 cc/m2.     I had seen him in clinic for a consultation on 11/17/2021.  At that time, he was experiencing some dyspnea on exertion.  For further evaluation patient was assessed with a cardiac stress MRI 12/7/2021 that demonstrated normal biventricular function, moderate aortic stenosis, small sized area of mild mid inferolateral ischemia.  We discussed options for further evaluation/management, cardiac  catheterization versus continued medical management.  Patient wanted to proceed with medical management.  We also increased atorvastatin to 40 mg nightly.      Since our last visit 10/5/2022, patient reports that he had been doing reasonably well, until recently.  Over the past couple of months he reports progressive dyspnea on exertion, with significant fatigue and lack of energy engaging in activities that previously had no issue with, such as working in his yard.  Walking up stairs has resulted in more fatigue and generalized weakness.  Symptoms have been progressive over the past several weeks.  He also had an episode of chest discomfort in the middle the night that was resolved with sublingual nitroglycerin, though none since then.  He denies symptoms of palpitations, no symptoms of orthopnea/PND, presyncope/syncope.    TTE 10/5/2023  Interpretation Summary     Moderate valvular aortic stenosis.  The calculated aortic valve area is 1.1cm2.  The mean AoV pressure gradient is 22mmHg.  The visual ejection fraction is 60-65%.  The study was technically difficult. Contrast was used without apparent  complications. Compared to prior study, there is no significant change.    Assessment and Plan/Recommendations:    # Progressive dyspnea on exertion, chest pain, concerning for accelerating angina. Has a known history of CAD with NSTEMI status post PCI (BMS to RCA 1996, BMS x2 to RCA near prior stent 2002), CMR stress test 12/2021 demonstrates small sized area of mild mid inferolateral ischemia that had been medically managed.   # Moderate aortic stenosis  # HTN, well controlled  # HL, LDL at goal <70  # DM2  # Obesity    -Discussed options for further evaluation management.  Given the progressive nature of his symptoms in the setting of his prior cardiac history, discussed transfer to ED for inpatient evaluation/management, patient declines at this time.  As such, we will proceed with urgent cardiac  catheterization/coronary angiography and possible intervention.   - Discussed the risks of cardiac catheterization/angiography +/- PCI that include but are not limited to the risk of stroke, heart attack, death, cardiac injury, emergent intervention such as stenting or bypass, contrast induced allergic reaction, renal dysfunction (including risks of temporary or permanent dialysis), and complications related to sedation and respiratory/pulmonary compromise, vascular complications (including bleeding and blood transfusion). Patient denies any major active bleeding issues and is willing to take and comply with dual antiplatelet therapy and understands the associated bleeding risks. Patient understands the overall risks of the procedure and wishes to proceed.  - Follow-up with cardiology RICKY post cardiac catheterization  - Continue current cardiac regimen of aspirin, metoprolol succinate, lisinopril, atorvastatin, sublingual nitroglycerin as needed  - TTE in 1 year with follow-up  - Discussed with Pt that they should seek medical attention if they experience any future episodes of chest pain/discomfort, worsening exertional capacity, dyspnea, or other concerning symptoms, and they voiced understanding      Thank you for allowing our team to participate in the care of Héctor Quispe.  Please do not hesitate to call or page me with any questions or concerns.    Sincerely,     Zac Cabrera MD, [facility]  Cardiology  Text Page   October 25, 2023    Voice recognition software utilized.     Past Medical History:     Past Medical History:   Diagnosis Date    Aortic stenosis     BPH (benign prostatic hyperplasia)     Coronary artery disease 01/06/2016    Diverticulosis of large intestine     Essential hypertension 01/06/2016    Gastroesophageal reflux disease     h/o 2019 novel coronavirus disease (COVID-19) 12/18/2020    Prostate cancer     Type 2 diabetes mellitus with diabetic nephropathy 01/06/2016        Past  Surgical History:   Past Surgical History:   Procedure Laterality Date    Brachytherapy for prostate cancer  2011    Coronary angiogram with stent placement x2.      CYSTOSCOPY      ENT SURGERY         Medications (outpatient):  Current Outpatient Medications   Medication Sig Dispense Refill    aspirin 81 MG tablet Take 1 tablet (81 mg) by mouth daily 90 tablet 11    atorvastatin (LIPITOR) 40 MG tablet Take 1 tablet (40 mg) by mouth at bedtime 90 tablet 11    empagliflozin (JARDIANCE) 25 MG TABS tablet Take 1 tablet (25 mg) by mouth daily 90 tablet 3    glipiZIDE (GLUCOTROL XL) 5 MG 24 hr tablet Take 1 tablet (5 mg) by mouth daily 90 tablet 3    lisinopril (ZESTRIL) 10 MG tablet Take 1 tablet (10 mg) by mouth daily 90 tablet 11    metFORMIN (GLUCOPHAGE) 1000 MG tablet TAKE 1 TABLET BY MOUTH TWICE DAILY WITH MEALS 180 tablet 3    metoprolol succinate ER (TOPROL XL) 25 MG 24 hr tablet Take 0.5 tablets (12.5 mg) by mouth daily 45 tablet 11    multivitamin, therapeutic (THERA-VIT) TABS tablet Take 1 tablet by mouth daily      nitroGLYcerin (NITROSTAT) 0.4 MG sublingual tablet For chest pain place 1 tablet under the tongue every 5 minutes for 3 doses. If symptoms persist 5 minutes after 1st dose call 911. 25 tablet 3    omeprazole 20 MG tablet Take 20 mg by mouth daily.      pioglitazone (ACTOS) 15 MG tablet TAKE 1 TABLET EVERY DAY 90 tablet 3    tamsulosin (FLOMAX) 0.4 MG capsule Take 1 capsule (0.4 mg) by mouth daily 90 capsule 11    triamcinolone (KENALOG) 0.1 % external cream Apply topically 2 times daily as needed for irritation 80 g 1       Allergies:  No Known Allergies    Social History:   History   Drug Use No      History   Smoking Status    Former    Types: Cigarettes    Quit date: 8/2/2002   Smokeless Tobacco    Never     Social History    Substance and Sexual Activity      Alcohol use: Yes        Alcohol/week: 3.0 standard drinks of alcohol        Types: 3 Cans of beer per week        Comment: few beers a  "day       Family History:  Family History   Problem Relation Age of Onset    Alzheimer Disease Mother     Cancer Father     Diabetes No family hx of     Coronary Artery Disease No family hx of     Hypertension No family hx of     Hyperlipidemia No family hx of     Cerebrovascular Disease No family hx of     Breast Cancer No family hx of     Colon Cancer No family hx of     Prostate Cancer No family hx of     Other Cancer No family hx of     Depression No family hx of     Anxiety Disorder No family hx of     Mental Illness No family hx of     Substance Abuse No family hx of     Anesthesia Reaction No family hx of     Asthma No family hx of     Osteoporosis No family hx of     Genetic Disorder No family hx of     Thyroid Disease No family hx of     Obesity No family hx of     Unknown/Adopted No family hx of        Review of Systems:   A complete review of systems was negative except as mentioned in the History of Present Illness.     Objective & Physical Exam:  /66   Pulse 69   Ht 1.753 m (5' 9\")   Wt 105.6 kg (232 lb 11.2 oz)   SpO2 97%   BMI 34.36 kg/m    Wt Readings from Last 2 Encounters:   10/25/23 105.6 kg (232 lb 11.2 oz)   10/24/23 105.7 kg (233 lb)     Body mass index is 34.36 kg/m .   Body surface area is 2.27 meters squared.    Constitutional: appears stated age, in no apparent distress, appears to be well nourished  Head: normocephalic, atraumatic  Neck: supple, trachea midline, no bruit bilaterally   Pulmonary: clear to auscultation bilaterally, no wheezes, no rales, no increased work of breathing  Cardiovascular: JVP normal, regular rate, regular rhythm, 2/6 DEBBIE at the RUSB, no lower extremity edema  Gastrointestinal: no guarding, non-rigid   Neurologic: awake, alert, moves all extremities  Skin: no jaundice, warm on limited exam  Psychiatric: affect is normal, answers questions appropriately, oriented to self and place    Data reviewed:  Lab Results   Component Value Date    WBC 4.4 " 10/24/2023    WBC 4.0 05/06/2015    RBC 4.10 (L) 10/24/2023    RBC 4.04 (L) 05/06/2015    HGB 12.7 (L) 10/24/2023    HGB 12.9 (L) 05/06/2015    HCT 38.7 (L) 10/24/2023    HCT 39.3 (L) 05/06/2015    MCV 94 10/24/2023    MCV 97 05/06/2015    MCH 31.0 10/24/2023    MCH 31.9 05/06/2015    MCHC 32.8 10/24/2023    MCHC 32.8 05/06/2015    RDW 14.2 10/24/2023    RDW 13.2 05/06/2015     10/24/2023     05/06/2015     Sodium   Date Value Ref Range Status   10/24/2023 137 135 - 145 mmol/L Final     Comment:     Reference intervals for this test were updated on 09/26/2023 to more accurately reflect our healthy population. There may be differences in the flagging of prior results with similar values performed with this method. Interpretation of those prior results can be made in the context of the updated reference intervals.    08/24/2020 138 133 - 144 mmol/L Final     Potassium   Date Value Ref Range Status   10/24/2023 4.6 3.4 - 5.3 mmol/L Final   08/05/2021 4.6 3.4 - 5.3 mmol/L Final   08/24/2020 4.1 3.4 - 5.3 mmol/L Final     Chloride   Date Value Ref Range Status   10/24/2023 99 98 - 107 mmol/L Final   08/05/2021 103 94 - 109 mmol/L Final   08/24/2020 105 94 - 109 mmol/L Final     Carbon Dioxide   Date Value Ref Range Status   08/24/2020 26 20 - 32 mmol/L Final     Carbon Dioxide (CO2)   Date Value Ref Range Status   10/24/2023 26 22 - 29 mmol/L Final   08/05/2021 25 20 - 32 mmol/L Final     Anion Gap   Date Value Ref Range Status   10/24/2023 12 7 - 15 mmol/L Final   08/05/2021 7 3 - 14 mmol/L Final   08/24/2020 7 3 - 14 mmol/L Final     Glucose   Date Value Ref Range Status   10/24/2023 137 (H) 70 - 99 mg/dL Final   08/05/2021 140 (H) 70 - 99 mg/dL Final   08/24/2020 146 (H) 70 - 99 mg/dL Final     Comment:     Fasting specimen     Urea Nitrogen   Date Value Ref Range Status   10/24/2023 19.4 8.0 - 23.0 mg/dL Final   08/05/2021 23 7 - 30 mg/dL Final   08/24/2020 24 7 - 30 mg/dL Final     Creatinine   Date  Value Ref Range Status   10/24/2023 0.87 0.67 - 1.17 mg/dL Final   08/24/2020 0.95 0.66 - 1.25 mg/dL Final     GFR Estimate   Date Value Ref Range Status   10/24/2023 90 >60 mL/min/1.73m2 Final   08/24/2020 79 >60 mL/min/[1.73_m2] Final     Comment:     Non  GFR Calc  Starting 12/18/2018, serum creatinine based estimated GFR (eGFR) will be   calculated using the Chronic Kidney Disease Epidemiology Collaboration   (CKD-EPI) equation.       Calcium   Date Value Ref Range Status   10/24/2023 9.4 8.8 - 10.2 mg/dL Final   08/24/2020 8.5 8.5 - 10.1 mg/dL Final     Bilirubin Total   Date Value Ref Range Status   04/24/2023 0.4 <=1.2 mg/dL Final   08/24/2020 0.4 0.2 - 1.3 mg/dL Final     Alkaline Phosphatase   Date Value Ref Range Status   04/24/2023 65 40 - 129 U/L Final   08/24/2020 69 40 - 150 U/L Final     ALT   Date Value Ref Range Status   04/24/2023 24 10 - 50 U/L Final   08/24/2020 30 0 - 70 U/L Final     AST   Date Value Ref Range Status   04/24/2023 26 10 - 50 U/L Final     Comment:     Specimen is hemolyzed which can falsely elevate AST. Analysis of a non-hemolyzed specimen may result in a lower value.    Specimen is hemolyzed which can falsely elevate AST. Analysis of a non-hemolyzed specimen may result in a lower value.   08/24/2020 15 0 - 45 U/L Final     Recent Labs   Lab Test 04/24/23  1106 11/15/22  0923   CHOL 131 138   HDL 62 74   LDL 47 42   TRIG 112 108      Lab Results   Component Value Date    A1C 7.4 10/24/2023    A1C 6.7 04/24/2023    A1C 7.3 11/15/2022    A1C 6.8 08/05/2021    A1C 7.9 01/11/2021    A1C 7.3 08/24/2020    A1C 8.4 10/31/2019    A1C 7.5 05/06/2019    A1C 7.2 11/13/2018        Recent Results (from the past 4320 hour(s))   Echocardiogram Complete   Result Value    LVEF  60-65%    Narrative    [id number]  [id number]  [id number]  [id number]^CHUCHO^LIDIA^TIFFANI     [facility]  [facility]  Echocardiography Laboratory  [address]  [place] [address]  Phone [phone]  Fax [phone]     Name: LUDMILA CODY  MRN: [id number]  : 1950  Study Date: 10/05/2023 12:49 PM  Age: 73 yrs  Gender: Male  Patient Location: [facility]  Reason For Study: Nonrheumatic aortic valve stenosis  Ordering Physician: LIDIA RANKIN  Referring Physician: LIDIA RANKIN  Performed By: Alicia Curtis RDCS     BSA: 2.2 m2  Height: 69 in  Weight: 230 lb  HR: 82  BP: 158/71 mmHg  ______________________________________________________________________________  Procedure  Complete Echo Adult. Optison (NDC #[id number]) given intravenously.     ______________________________________________________________________________  Interpretation Summary     Moderate valvular aortic stenosis.  The calculated aortic valve area is 1.1cm2.  The mean AoV pressure gradient is 22mmHg.  The visual ejection fraction is 60-65%.  The study was technically difficult. Contrast was used without apparent  complications. Compared to prior study, there is no significant change.  ______________________________________________________________________________  Left Ventricle  The left ventricle is normal in size. There is normal left ventricular wall  thickness. The visual ejection fraction is 60-65%. Left ventricular diastolic  function is normal. No regional wall motion abnormalities noted.     Right Ventricle  The right ventricle is normal in structure, function and size.     Atria  Normal left atrial size. Right atrial size is normal. There is no color  Doppler evidence of an atrial shunt.     Mitral Valve  The mitral valve is normal in structure and function.     Tricuspid Valve  The tricuspid valve is normal in structure and function. Right ventricle  systolic pressure estimate normal. There is trace tricuspid regurgitation.     Aortic Valve  Moderate valvular aortic stenosis. The calculated aortic valve area is 1.1cm2.  The mean AoV pressure gradient is 22mmHg.     Pulmonic Valve  The  pulmonic valve is not well seen, but is grossly normal.     Vessels  Normal size aorta. Normal size ascending aorta. The inferior vena cava is  normal.     Pericardium  The pericardium appears normal.     Rhythm  Sinus rhythm was noted.     ______________________________________________________________________________  MMode/2D Measurements & Calculations  IVSd: 1.2 cm  LVIDd: 4.2 cm  LVIDs: 2.1 cm  LVPWd: 1.3 cm  FS: 49.5 %  LV mass(C)d: 195.3 grams  LV mass(C)dI: 89.1 grams/m2  Ao root diam: 3.6 cm  LA dimension: 4.4 cm     asc Aorta Diam: 3.2 cm  LA/Ao: 1.2  LVOT diam: 2.2 cm  LVOT area: 3.7 cm2  Ao root diam index Ht(cm/m): 2.0  Ao root diam index BSA (cm/m2): 1.6  Asc Ao diam index BSA (cm/m2): 1.5  Asc Ao diam index Ht(cm/m): 1.8  RWT: 0.63     Doppler Measurements & Calculations  MV E max jorge luis: 68.1 cm/sec  MV A max jorge luis: 90.4 cm/sec  MV E/A: 0.75  MV dec time: 0.25 sec  Ao V2 max: 314.0 cm/sec  Ao max P.4 mmHg  Ao V2 mean: 219.7 cm/sec  Ao mean P.4 mmHg  Ao V2 VTI: 61.1 cm  ARMANDO(I,D): 1.1 cm2  ARMANDO(V,D): 1.2 cm2  LV V1 max P.2 mmHg  LV V1 max: 103.0 cm/sec  LV V1 VTI: 17.8 cm  SV(LVOT): 66.5 ml  SI(LVOT): 30.4 ml/m2  AV Jorge Luis Ratio (DI): 0.33  ARMANDO Index (cm2/m2): 0.50     E/E' av.7  Lateral E/e': 8.7  Medial E/e': 10.8  RV S Jorge Luis: 15.6 cm/sec     ______________________________________________________________________________  Report approved by: Marisol Salinas 10/05/2023 03:57 PM

## 2023-10-26 DIAGNOSIS — E11.65 TYPE 2 DIABETES MELLITUS WITH HYPERGLYCEMIA, WITHOUT LONG-TERM CURRENT USE OF INSULIN (H): ICD-10-CM

## 2023-10-26 DIAGNOSIS — I25.10 CORONARY ARTERY DISEASE INVOLVING NATIVE CORONARY ARTERY OF NATIVE HEART WITHOUT ANGINA PECTORIS: Primary | ICD-10-CM

## 2023-11-01 ENCOUNTER — TELEPHONE (OUTPATIENT)
Dept: CARDIOLOGY | Facility: CLINIC | Age: 73
End: 2023-11-01
Payer: COMMERCIAL

## 2023-11-01 DIAGNOSIS — I20.0 WORSENING ANGINA (H): ICD-10-CM

## 2023-11-01 DIAGNOSIS — I25.10 CORONARY ARTERY DISEASE INVOLVING NATIVE CORONARY ARTERY OF NATIVE HEART WITHOUT ANGINA PECTORIS: Primary | ICD-10-CM

## 2023-11-01 RX ORDER — ASPIRIN 81 MG/1
243 TABLET, CHEWABLE ORAL ONCE
Status: CANCELLED | OUTPATIENT
Start: 2023-11-01

## 2023-11-01 RX ORDER — ASPIRIN 325 MG
325 TABLET ORAL ONCE
Status: CANCELLED | OUTPATIENT
Start: 2023-11-01 | End: 2023-11-01

## 2023-11-01 RX ORDER — POTASSIUM CHLORIDE 1500 MG/1
20 TABLET, EXTENDED RELEASE ORAL
Status: CANCELLED | OUTPATIENT
Start: 2023-11-01

## 2023-11-01 RX ORDER — SODIUM CHLORIDE 9 MG/ML
INJECTION, SOLUTION INTRAVENOUS CONTINUOUS
Status: CANCELLED | OUTPATIENT
Start: 2023-11-01

## 2023-11-01 RX ORDER — LIDOCAINE 40 MG/G
CREAM TOPICAL
Status: CANCELLED | OUTPATIENT
Start: 2023-11-01

## 2023-11-01 NOTE — TELEPHONE ENCOUNTER
----- Message from Veronique Bettencourt RN sent at 10/26/2023 11:21 AM CDT -----  Regarding: check prep, insurance & 3 DAY JARDIANCE HOLD,FW: [facility] w/possible PCI - Radial Approach - 11/7/23

## 2023-11-01 NOTE — TELEPHONE ENCOUNTER
Coronary angiogram/PCI/Right Heart Cath prep instructions.     Patient is scheduled for a Coronary Angio w/possible PCI at [facility] - [address], [place] [address] - Main Entrance of the Hospital on 11-7-23.  Check in time is at 10:00 and procedure to follow.    Patient instructed to remain NPO for solid foods 8 hours prior to arrival and may have clear liquids up to 2 hours prior to arrival.    Patient Patient does not require extra fluids prior to procedure.    Patient is on metformin and has been advised to hold Metformin the day of the procedure. They should continue to hold until after follow up BMP scheduled 11-9-23  is reviewed.  Pt will be called and advised when they can resume their metformin.    Pt to hold Glipazide and Actos the day of procedure.     Patient is not on anticoagulation.    Patient is not on diuretics.     Patient is taking ASA 81mg daily and will take 4 tabs (324mg) the morning of the procedure.    Pt is on Jardiance and should hold it for 3 days prior to procedure.    Pt is not on a GLP-1 Agonist    Patient advised to take their other daily medications the morning of the procedure with small sips of water.     Verified patient does not have a contrast allergy.    Verified patient has someone available to drive them home from the hospital and can stay with them for 24 hours after the procedure.     Patient advised to notify care team with any new COVID like symptoms prior to procedure.    Patient will check their temperature the morning of procedure and call [facility] at [phone] if temp is >100.0.    Patient is aware of visitor policy.    Patient expresses understanding of above instructions and denies further questions at this time.      [facility]

## 2023-11-07 ENCOUNTER — HOSPITAL ENCOUNTER (OUTPATIENT)
Facility: CLINIC | Age: 73
Discharge: HOME | End: 2023-11-07
Admitting: INTERNAL MEDICINE
Payer: COMMERCIAL

## 2023-11-07 VITALS
OXYGEN SATURATION: 94 % | HEIGHT: 69 IN | SYSTOLIC BLOOD PRESSURE: 153 MMHG | RESPIRATION RATE: 20 BRPM | DIASTOLIC BLOOD PRESSURE: 86 MMHG | WEIGHT: 232.59 LBS | BODY MASS INDEX: 34.45 KG/M2 | TEMPERATURE: 97.6 F | HEART RATE: 94 BPM

## 2023-11-07 DIAGNOSIS — I25.10 ATHEROSCLEROSIS OF NATIVE CORONARY ARTERY OF NATIVE HEART, UNSPECIFIED WHETHER ANGINA PRESENT: Primary | ICD-10-CM

## 2023-11-07 DIAGNOSIS — I25.10 ATHEROSCLEROSIS OF CORONARY ARTERY OF NATIVE HEART WITHOUT ANGINA PECTORIS, UNSPECIFIED VESSEL OR LESION TYPE: ICD-10-CM

## 2023-11-07 DIAGNOSIS — I20.0 WORSENING ANGINA (H): ICD-10-CM

## 2023-11-07 DIAGNOSIS — I20.0 ACCELERATING ANGINA (H): ICD-10-CM

## 2023-11-07 DIAGNOSIS — I25.10 CORONARY ARTERY DISEASE INVOLVING NATIVE CORONARY ARTERY OF NATIVE HEART WITHOUT ANGINA PECTORIS: ICD-10-CM

## 2023-11-07 LAB
ANION GAP SERPL CALCULATED.3IONS-SCNC: 12 MMOL/L (ref 7–15)
BUN SERPL-MCNC: 21 MG/DL (ref 8–23)
CALCIUM SERPL-MCNC: 9.4 MG/DL (ref 8.8–10.2)
CHLORIDE SERPL-SCNC: 100 MMOL/L (ref 98–107)
CREAT SERPL-MCNC: 0.71 MG/DL (ref 0.67–1.17)
DEPRECATED HCO3 PLAS-SCNC: 23 MMOL/L (ref 22–29)
EGFRCR SERPLBLD CKD-EPI 2021: >90 ML/MIN/1.73M2
ERYTHROCYTE [DISTWIDTH] IN BLOOD BY AUTOMATED COUNT: 14.3 % (ref 10–15)
GLUCOSE SERPL-MCNC: 180 MG/DL (ref 70–99)
HCT VFR BLD AUTO: 40.5 % (ref 40–53)
HGB BLD-MCNC: 13.2 G/DL (ref 13.3–17.7)
INR PPP: 0.88 (ref 0.85–1.15)
MCH RBC QN AUTO: 31.8 PG (ref 26.5–33)
MCHC RBC AUTO-ENTMCNC: 32.6 G/DL (ref 31.5–36.5)
MCV RBC AUTO: 98 FL (ref 78–100)
PLATELET # BLD AUTO: 224 10E3/UL (ref 150–450)
POTASSIUM SERPL-SCNC: 4.7 MMOL/L (ref 3.4–5.3)
RBC # BLD AUTO: 4.15 10E6/UL (ref 4.4–5.9)
SODIUM SERPL-SCNC: 135 MMOL/L (ref 135–145)
WBC # BLD AUTO: 5.7 10E3/UL (ref 4–11)

## 2023-11-07 PROCEDURE — 93572 IV DOP VEL&/PRESS C FLO EA: CPT | Mod: 26 | Performed by: INTERNAL MEDICINE

## 2023-11-07 PROCEDURE — 85048 AUTOMATED LEUKOCYTE COUNT: CPT | Performed by: INTERNAL MEDICINE

## 2023-11-07 PROCEDURE — 93571 IV DOP VEL&/PRESS C FLO 1ST: CPT | Mod: 26 | Performed by: INTERNAL MEDICINE

## 2023-11-07 PROCEDURE — 258N000003 HC RX IP 258 OP 636: Performed by: INTERNAL MEDICINE

## 2023-11-07 PROCEDURE — 250N000009 HC RX 250: Performed by: INTERNAL MEDICINE

## 2023-11-07 PROCEDURE — C1894 INTRO/SHEATH, NON-LASER: HCPCS | Performed by: INTERNAL MEDICINE

## 2023-11-07 PROCEDURE — 93571 IV DOP VEL&/PRESS C FLO 1ST: CPT | Performed by: INTERNAL MEDICINE

## 2023-11-07 PROCEDURE — 85014 HEMATOCRIT: CPT | Performed by: INTERNAL MEDICINE

## 2023-11-07 PROCEDURE — 93799 UNLISTED CV SVC/PROCEDURE: CPT

## 2023-11-07 PROCEDURE — C1769 GUIDE WIRE: HCPCS | Performed by: INTERNAL MEDICINE

## 2023-11-07 PROCEDURE — 99152 MOD SED SAME PHYS/QHP 5/>YRS: CPT | Performed by: INTERNAL MEDICINE

## 2023-11-07 PROCEDURE — 36415 COLL VENOUS BLD VENIPUNCTURE: CPT | Performed by: INTERNAL MEDICINE

## 2023-11-07 PROCEDURE — 93454 CORONARY ARTERY ANGIO S&I: CPT | Performed by: INTERNAL MEDICINE

## 2023-11-07 PROCEDURE — 80048 BASIC METABOLIC PNL TOTAL CA: CPT | Performed by: INTERNAL MEDICINE

## 2023-11-07 PROCEDURE — 93454 CORONARY ARTERY ANGIO S&I: CPT | Mod: 26 | Performed by: INTERNAL MEDICINE

## 2023-11-07 PROCEDURE — 272N000001 HC OR GENERAL SUPPLY STERILE: Performed by: INTERNAL MEDICINE

## 2023-11-07 PROCEDURE — 250N000011 HC RX IP 250 OP 636: Performed by: INTERNAL MEDICINE

## 2023-11-07 PROCEDURE — 93799 UNLISTED CV SVC/PROCEDURE: CPT | Performed by: INTERNAL MEDICINE

## 2023-11-07 PROCEDURE — 36415 COLL VENOUS BLD VENIPUNCTURE: CPT

## 2023-11-07 PROCEDURE — 99153 MOD SED SAME PHYS/QHP EA: CPT | Performed by: INTERNAL MEDICINE

## 2023-11-07 PROCEDURE — C1887 CATHETER, GUIDING: HCPCS | Performed by: INTERNAL MEDICINE

## 2023-11-07 PROCEDURE — 85610 PROTHROMBIN TIME: CPT

## 2023-11-07 PROCEDURE — 250N000011 HC RX IP 250 OP 636: Mod: JZ | Performed by: INTERNAL MEDICINE

## 2023-11-07 RX ORDER — OXYCODONE HYDROCHLORIDE 5 MG/1
5 TABLET ORAL EVERY 4 HOURS PRN
Status: CANCELLED | OUTPATIENT
Start: 2023-11-07

## 2023-11-07 RX ORDER — HEPARIN SODIUM 1000 [USP'U]/ML
INJECTION, SOLUTION INTRAVENOUS; SUBCUTANEOUS
Status: DISCONTINUED | OUTPATIENT
Start: 2023-11-07 | End: 2023-11-07 | Stop reason: HOSPADM

## 2023-11-07 RX ORDER — NALOXONE HYDROCHLORIDE 0.4 MG/ML
0.2 INJECTION, SOLUTION INTRAMUSCULAR; INTRAVENOUS; SUBCUTANEOUS
Status: DISCONTINUED | OUTPATIENT
Start: 2023-11-07 | End: 2023-11-07 | Stop reason: HOSPADM

## 2023-11-07 RX ORDER — ATROPINE SULFATE 0.1 MG/ML
0.5 INJECTION INTRAVENOUS
Status: DISCONTINUED | OUTPATIENT
Start: 2023-11-07 | End: 2023-11-07 | Stop reason: HOSPADM

## 2023-11-07 RX ORDER — FENTANYL CITRATE 50 UG/ML
INJECTION, SOLUTION INTRAMUSCULAR; INTRAVENOUS
Status: DISCONTINUED | OUTPATIENT
Start: 2023-11-07 | End: 2023-11-07 | Stop reason: HOSPADM

## 2023-11-07 RX ORDER — POTASSIUM CHLORIDE 1500 MG/1
20 TABLET, EXTENDED RELEASE ORAL
Status: DISCONTINUED | OUTPATIENT
Start: 2023-11-07 | End: 2023-11-07 | Stop reason: HOSPADM

## 2023-11-07 RX ORDER — NITROGLYCERIN 5 MG/ML
VIAL (ML) INTRAVENOUS
Status: DISCONTINUED | OUTPATIENT
Start: 2023-11-07 | End: 2023-11-07 | Stop reason: HOSPADM

## 2023-11-07 RX ORDER — ASPIRIN 325 MG
325 TABLET ORAL ONCE
Status: COMPLETED | OUTPATIENT
Start: 2023-11-07 | End: 2023-11-07

## 2023-11-07 RX ORDER — NALOXONE HYDROCHLORIDE 0.4 MG/ML
0.4 INJECTION, SOLUTION INTRAMUSCULAR; INTRAVENOUS; SUBCUTANEOUS
Status: DISCONTINUED | OUTPATIENT
Start: 2023-11-07 | End: 2023-11-07 | Stop reason: HOSPADM

## 2023-11-07 RX ORDER — LIDOCAINE 40 MG/G
CREAM TOPICAL
Status: CANCELLED | OUTPATIENT
Start: 2023-11-07

## 2023-11-07 RX ORDER — FLUMAZENIL 0.1 MG/ML
0.2 INJECTION, SOLUTION INTRAVENOUS
Status: DISCONTINUED | OUTPATIENT
Start: 2023-11-07 | End: 2023-11-07 | Stop reason: HOSPADM

## 2023-11-07 RX ORDER — VERAPAMIL HYDROCHLORIDE 2.5 MG/ML
INJECTION, SOLUTION INTRAVENOUS
Status: DISCONTINUED | OUTPATIENT
Start: 2023-11-07 | End: 2023-11-07 | Stop reason: HOSPADM

## 2023-11-07 RX ORDER — HEPARIN SODIUM 1000 [USP'U]/ML
INJECTION, SOLUTION INTRAVENOUS; SUBCUTANEOUS
Status: DISCONTINUED
Start: 2023-11-07 | End: 2023-11-07 | Stop reason: HOSPADM

## 2023-11-07 RX ORDER — OXYCODONE HYDROCHLORIDE 10 MG/1
10 TABLET ORAL EVERY 4 HOURS PRN
Status: CANCELLED | OUTPATIENT
Start: 2023-11-07

## 2023-11-07 RX ORDER — LIDOCAINE HYDROCHLORIDE 10 MG/ML
INJECTION, SOLUTION EPIDURAL; INFILTRATION; INTRACAUDAL; PERINEURAL
Status: DISCONTINUED
Start: 2023-11-07 | End: 2023-11-07 | Stop reason: HOSPADM

## 2023-11-07 RX ORDER — SODIUM CHLORIDE 9 MG/ML
INJECTION, SOLUTION INTRAVENOUS CONTINUOUS
Status: DISCONTINUED | OUTPATIENT
Start: 2023-11-07 | End: 2023-11-07 | Stop reason: HOSPADM

## 2023-11-07 RX ORDER — ASPIRIN 81 MG/1
243 TABLET, CHEWABLE ORAL ONCE
Status: COMPLETED | OUTPATIENT
Start: 2023-11-07 | End: 2023-11-07

## 2023-11-07 RX ORDER — FENTANYL CITRATE 50 UG/ML
INJECTION, SOLUTION INTRAMUSCULAR; INTRAVENOUS
Status: DISCONTINUED
Start: 2023-11-07 | End: 2023-11-07 | Stop reason: HOSPADM

## 2023-11-07 RX ORDER — FENTANYL CITRATE 50 UG/ML
INJECTION, SOLUTION INTRAMUSCULAR; INTRAVENOUS
Status: DISCONTINUED
Start: 2023-11-07 | End: 2023-11-07 | Stop reason: WASHOUT

## 2023-11-07 RX ORDER — FENTANYL CITRATE 50 UG/ML
25 INJECTION, SOLUTION INTRAMUSCULAR; INTRAVENOUS
Status: DISCONTINUED | OUTPATIENT
Start: 2023-11-07 | End: 2023-11-07 | Stop reason: HOSPADM

## 2023-11-07 RX ORDER — VERAPAMIL HYDROCHLORIDE 2.5 MG/ML
INJECTION, SOLUTION INTRAVENOUS
Status: DISCONTINUED
Start: 2023-11-07 | End: 2023-11-07 | Stop reason: HOSPADM

## 2023-11-07 RX ORDER — IOPAMIDOL 755 MG/ML
INJECTION, SOLUTION INTRAVASCULAR
Status: DISCONTINUED | OUTPATIENT
Start: 2023-11-07 | End: 2023-11-07 | Stop reason: HOSPADM

## 2023-11-07 RX ORDER — LIDOCAINE 40 MG/G
CREAM TOPICAL
Status: DISCONTINUED | OUTPATIENT
Start: 2023-11-07 | End: 2023-11-07 | Stop reason: HOSPADM

## 2023-11-07 RX ORDER — NITROGLYCERIN 5 MG/ML
VIAL (ML) INTRAVENOUS
Status: DISCONTINUED
Start: 2023-11-07 | End: 2023-11-07 | Stop reason: HOSPADM

## 2023-11-07 RX ADMIN — SODIUM CHLORIDE: 9 INJECTION, SOLUTION INTRAVENOUS at 11:49

## 2023-11-07 ASSESSMENT — ACTIVITIES OF DAILY LIVING (ADL)
ADLS_ACUITY_SCORE: 35

## 2023-11-07 NOTE — PROCEDURES
[facility]    Procedure: Coronary angiogram /IFR proximal LAD mid RCA    Date/Time: 11/7/2023 1:28 PM    Performed by: Odilon Houser MD  Authorized by: Odilon Houser MD      UNIVERSAL PROTOCOL   Site Marked: Yes  Prior Images Obtained and Reviewed:  Yes  Required items: Required blood products, implants, devices and special equipment available    Patient identity confirmed:  Verbally with patient  Patient was reevaluated immediately before administering moderate or deep sedation or anesthesia  Confirmation Checklist:  Patient's identity using two indicators  Time out: Immediately prior to the procedure a time out was called    Universal Protocol: the Joint Commission Universal Protocol was followed    Preparation: Patient was prepped and draped in usual sterile fashion       ANESTHESIA    Local Anesthetic:  Lidocaine 1% without epinephrine      SEDATION  Patient Sedated: Yes    Sedation:  Fentanyl and midazolam  Vital signs: Vital signs monitored during sedation      PROCEDURE  Describe Procedure: Procedure  1) CAG  2) IFR proximal LAD / mid RCA  1.0  /0.956    Findings  LMCA mild calcification atheromatous change with no focal stenosis  LAD smooth calcified eccentric proximal 40 to 50% IFR 1.0\  CX small vessel  after M1 collaterals from RCA  RCA  Dominant. Large posterolateral system. tortuous irregular 50% after marginal  IFR 0.95      Assessment : At this time it does not appear he would benefit from mechanical revascularization    Recommendation   GDMT for chronic CAD  Weight loss exercise  evaluate for noncardiac cause if symptoms persist or worsen    Patient Tolerance:  Patient tolerated the procedure well with no immediate complications  Length of time physician/provider present for 1:1 monitoring during sedation: 30

## 2023-11-07 NOTE — PRE-PROCEDURE
GENERAL PRE-PROCEDURE:   Procedure:  Coaronary angiogram possible percutaneous coronary intervention  Date/Time:  11/7/2023 12:43 PM    Verbal consent obtained?: Yes    Written consent obtained?: Yes    Risks and benefits: Risks, benefits and alternatives were discussed    DC Plan: Appropriate discharge home plan in place for patients who are going home after procedure   Consent given by:  Patient  Patient states understanding of procedure being performed: Yes    Patient's understanding of procedure matches consent: Yes    Procedure consent matches procedure scheduled: Yes    Expected level of sedation:  Moderate  Appropriately NPO:  Yes  ASA Class:  2  Mallampati  :  Grade 2- soft palate, base of uvula, tonsillar pillars, and portion of posterior pharyngeal wall visible  Lungs:  Lungs clear with good breath sounds bilaterally  Heart:  Normal heart sounds and rate  History & Physical reviewed:  History and physical reviewed and no updates needed  Statement of review:  I have reviewed the lab findings, diagnostic data, medications, and the plan for sedation  I have examined the patient, reviewed the history, medications and pre procedural tests. Exertional fatigue/dyspnea in setting of known CAD sp multiple PCI RCA.I have explained to the patient the risks of death, MI, stroke, hematoma, possible urgent bypass surgery for failed PCI, use of stents, thienopyridine agents, possible peripheral vascular complications, arrhythmia, the use of FFR in clinical decision-making and alternative of medical therapy alone in regards to left heart catheterization, left ventriculography, coronary angiography, and possible percutaneous coronary intervention. The patient voiced understanding and wishes to proceed. The patient has a good right radial pulse, normal ulnar pulse and a normal Paulo's sign.

## 2023-11-07 NOTE — PROGRESS NOTES
Reviewed discharge instructions with patient and spouse. Questions answered. Patient discharged to home with discharge instructions, and belongings.

## 2023-11-08 LAB
ATRIAL RATE - MUSE: 72 BPM
DIASTOLIC BLOOD PRESSURE - MUSE: NORMAL MMHG
INTERPRETATION ECG - MUSE: NORMAL
P AXIS - MUSE: 24 DEGREES
PR INTERVAL - MUSE: 190 MS
QRS DURATION - MUSE: 102 MS
QT - MUSE: 412 MS
QTC - MUSE: 451 MS
R AXIS - MUSE: -18 DEGREES
SYSTOLIC BLOOD PRESSURE - MUSE: NORMAL MMHG
T AXIS - MUSE: 3 DEGREES
VENTRICULAR RATE- MUSE: 72 BPM

## 2023-11-09 ENCOUNTER — TELEPHONE (OUTPATIENT)
Dept: CARDIOLOGY | Facility: CLINIC | Age: 73
End: 2023-11-09

## 2023-11-09 ENCOUNTER — MYC MEDICAL ADVICE (OUTPATIENT)
Dept: CARDIOLOGY | Facility: CLINIC | Age: 73
End: 2023-11-09

## 2023-11-09 ENCOUNTER — LAB (OUTPATIENT)
Dept: LAB | Facility: CLINIC | Age: 73
End: 2023-11-09
Payer: COMMERCIAL

## 2023-11-09 DIAGNOSIS — I25.10 CORONARY ARTERY DISEASE INVOLVING NATIVE CORONARY ARTERY OF NATIVE HEART WITHOUT ANGINA PECTORIS: ICD-10-CM

## 2023-11-09 DIAGNOSIS — E11.65 TYPE 2 DIABETES MELLITUS WITH HYPERGLYCEMIA, WITHOUT LONG-TERM CURRENT USE OF INSULIN (H): ICD-10-CM

## 2023-11-09 LAB
ANION GAP SERPL CALCULATED.3IONS-SCNC: 12 MMOL/L (ref 7–15)
BUN SERPL-MCNC: 16.9 MG/DL (ref 8–23)
CALCIUM SERPL-MCNC: 9 MG/DL (ref 8.8–10.2)
CHLORIDE SERPL-SCNC: 98 MMOL/L (ref 98–107)
CREAT SERPL-MCNC: 0.75 MG/DL (ref 0.67–1.17)
DEPRECATED HCO3 PLAS-SCNC: 24 MMOL/L (ref 22–29)
EGFRCR SERPLBLD CKD-EPI 2021: >90 ML/MIN/1.73M2
GLUCOSE SERPL-MCNC: 216 MG/DL (ref 70–99)
POTASSIUM SERPL-SCNC: 4.5 MMOL/L (ref 3.4–5.3)
SODIUM SERPL-SCNC: 134 MMOL/L (ref 135–145)

## 2023-11-09 PROCEDURE — 80048 BASIC METABOLIC PNL TOTAL CA: CPT | Performed by: INTERNAL MEDICINE

## 2023-11-09 PROCEDURE — 36415 COLL VENOUS BLD VENIPUNCTURE: CPT | Performed by: INTERNAL MEDICINE

## 2023-11-09 NOTE — TELEPHONE ENCOUNTER
Patient called message left  to resume diabetic medications.   Na 134, K+ 4.5, BUN 16, Creat 0.75, GFR >90.     Please return call - may leave message that labs are ok and to resume diabetic medications and reminder of upcoming RICKY OV 12-15-23.     Labs should be available in My Chart for review.     BMP for review.done post Heart cath 11-7-23.  - Diabetic on Jardance, glipizide XL, metformin, Actos,   Next RICKY OV 12-15-23

## 2023-11-09 NOTE — RESULT ENCOUNTER NOTE
Results reviewed, please let the patient know that overall findings are reassuring, findings do not suggest his symptoms are related to obstructive CAD.   Follow up as previously planned, thanks!

## 2023-11-09 NOTE — TELEPHONE ENCOUNTER
My Chart message sent to Pt after Dr. Cabrera reviewed Coronary angiogram  gram results.   Reply - overall findings are reassuring, findings do not suggest his symptoms are related to obstructive CAD.

## 2023-11-09 NOTE — TELEPHONE ENCOUNTER
----- Message from Zac Cabrera MD sent at 11/9/2023  2:27 PM CST -----  Results reviewed, please let the patient know that overall findings are reassuring, findings do not suggest his symptoms are related to obstructive CAD.   Follow up as previously planned, thanks!

## 2023-11-09 NOTE — TELEPHONE ENCOUNTER
Message from patient:  Andrew Quispe [facility] Heart Team 2  Phone Number: [phone]     When should I resume Jardience  and metforman?    Reply to patient:    Tenzin, Mr. Quispe,    You may resume both medications. Your labwork from today was stable.    Thank you  Team 2 R.N.s  [phone]

## 2023-11-09 NOTE — RESULT ENCOUNTER NOTE
Results reviewed, please let the patient know that overall findings are reassuring, stable renal function.   Follow up as previously planned, thanks!

## 2023-11-09 NOTE — TELEPHONE ENCOUNTER
Spoke to patient, reviewed labs and that he can restart his metformin. He also asks about jardiance, discussed that that can also be restarted if not already done. Post-procedure hold was not required, just for 3 days prior. He was appreciative for the call.

## 2023-12-15 ENCOUNTER — TRANSFERRED RECORDS (OUTPATIENT)
Dept: HEALTH INFORMATION MANAGEMENT | Facility: CLINIC | Age: 73
End: 2023-12-15

## 2023-12-15 ENCOUNTER — OFFICE VISIT (OUTPATIENT)
Dept: CARDIOLOGY | Facility: CLINIC | Age: 73
End: 2023-12-15
Payer: COMMERCIAL

## 2023-12-15 VITALS
BODY MASS INDEX: 34.72 KG/M2 | DIASTOLIC BLOOD PRESSURE: 60 MMHG | WEIGHT: 234.4 LBS | HEIGHT: 69 IN | OXYGEN SATURATION: 93 % | HEART RATE: 82 BPM | SYSTOLIC BLOOD PRESSURE: 124 MMHG

## 2023-12-15 DIAGNOSIS — I20.0 ACCELERATING ANGINA (H): ICD-10-CM

## 2023-12-15 DIAGNOSIS — I25.10 CORONARY ARTERY DISEASE INVOLVING NATIVE CORONARY ARTERY OF NATIVE HEART WITHOUT ANGINA PECTORIS: ICD-10-CM

## 2023-12-15 LAB — RETINOPATHY: POSITIVE

## 2023-12-15 PROCEDURE — 99214 OFFICE O/P EST MOD 30 MIN: CPT | Performed by: NURSE PRACTITIONER

## 2023-12-15 NOTE — PROGRESS NOTES
Cardiology Clinic Progress Note  Héctor Quispe MRN# [id number]   YOB: 1950 Age: 73 year old   Primary Cardiologist: Dr. Cabrera  Reason for visit: Cardiology follow up            Assessment and Plan:   Héctor Quispe is a very pleasant 73 year old male here for cardiology follow up.     1. Coronary artery disease - s/p BMS to RCA 1996, BMS x2 to RCA near prior stent 2002   - cMRI 12/2021 showed small sized area of mild mid inferolateral ischemia that had been medically managed.     - coronary angiogram 11/2023 40-50% of LAD (IFR 1.0), circumflex small vessel with  after M1 (too small for intervention), distal vessel fills via collateral, RCA distal irregular 50% (IFR 0.95).   2. Hypertension  3. Hyperlipidemia  4. Moderate aortic stenosis  5. Obesity  6. DM type II    I saw patient today for post angiogram follow up, he underwent a coronary angiogram in November due to progressive  dyspnea on exertion, chest pain, concerning for accelerating angina. Angiogram showed 40-50% of LAD (IFR 1.0), circumflex small vessel with  after M1 (too small for intervention), distal vessel fills via collateral, RCA distal irregular 50% (IFR 0.95), noting appears symptoms are unlikely related to ischemia.      He has been doing well since procedure, reporting no recurrent chest pain and mild exertional dyspnea with strenuous activity. He has been trying to get into a more consistent exercise routine, praise given.     No medication changes today, reviewed s/s of when to notify clinic.     Changes today: none    Follow up plan:     Follow up with Dr. Cabrera fall 2024 with echocardiogram prior         History of Presenting Illness:    Héctor Quispe is a very pleasant 73 year old male with a history of coronary artery disease with NSTEMI s/p PCI (BMS to RCA 1996, BMS x2 to RCA near prior stent 2002), hypertension, hyperlipidemia, diabetes, obesity and aortic stenosis.     Patient presented with unstable angina in 1996,  he underwent coronary angiogram with BMS placement to RCA. In 2002 he had another NSTEMI and underwent 2 overlapping 4 mm bare-metal stents in the RCA near the prior stent.     Cardiac stress MRI 12/7/2021 showed LVEF 70%, moderate aortic stenosis and small size mild mid inferolateral ischemia on regadenoson stress perfusion. Mild ischemia medically managed.     Echo 10/5/23 showed LVEF 60-65%, moderate aortic stenosis, valve area 1.1cm2, mean AoV pressure gradient 22mmHg, no significant change from prior study.     During his last OV with Dr. Cabrera in October 2023 he noted progressive dyspnea on exertion, chest pain, concerning for accelerating angina. Recommended urgent coronary angiogram which he underwent 11/7/23, which demonstrated 40-50% of LAD (IFR 1.0), circumflex small vessel with  after M1 (too small for intervention), distal vessel fills via collateral, RCA distal irregular 50% (IFR 0.95), noting appears symptoms are unlikely related to ischemia.      Patient is here today for cardiology follow up.     Patient reports feeling good. Denies chest pain or chest tightness. Denies shortness of breath a rest. Some exertional dyspnea with stairs or carrying heavy things. Denies orthopnea or PND. Denies LE edema. Denies dizziness, lightheadedness or other presyncopal symptoms. Denies falls. Denies syncope. Denies tachycardia or palpitations. Taking medications daily.     Labs from November showed stable kidney function and electrolytes. Blood pressure 124/60 and HR 82 in clinic today.    Appetite good. Eating meals at home and out, but mostly at home. Walking every other day for 15 mins. Denies tobacco use. A couple beers most days.         Social History      Social History     Socioeconomic History    Marital status:      Spouse name: Not on file    Number of children: Not on file    Years of education: Not on file    Highest education level: Not on file   Occupational History    Not on file   Tobacco  Use    Smoking status: Former     Types: Cigarettes     Quit date: 2002     Years since quittin.3    Smokeless tobacco: Never   Vaping Use    Vaping Use: Never used   Substance and Sexual Activity    Alcohol use: Yes     Alcohol/week: 3.0 standard drinks of alcohol     Types: 3 Cans of beer per week     Comment: few beers a day    Drug use: No    Sexual activity: Not Currently     Partners: Female   Other Topics Concern    Parent/sibling w/ CABG, MI or angioplasty before 65F 55M? No   Social History Narrative    Not on file     Social Determinants of Health     Financial Resource Strain: Low Risk  (10/24/2023)    Financial Resource Strain     Within the past 12 months, have you or your family members you live with been unable to get utilities (heat, electricity) when it was really needed?: No   Food Insecurity: Low Risk  (10/24/2023)    Food Insecurity     Within the past 12 months, did you worry that your food would run out before you got money to buy more?: No     Within the past 12 months, did the food you bought just not last and you didn t have money to get more?: No   Transportation Needs: Low Risk  (10/24/2023)    Transportation Needs     Within the past 12 months, has lack of transportation kept you from medical appointments, getting your medicines, non-medical meetings or appointments, work, or from getting things that you need?: No   Physical Activity: Not on file   Stress: Not on file   Social Connections: Not on file   Interpersonal Safety: Low Risk  (10/24/2023)    Interpersonal Safety     Do you feel physically and emotionally safe where you currently live?: Yes     Within the past 12 months, have you been hit, slapped, kicked or otherwise physically hurt by someone?: No     Within the past 12 months, have you been humiliated or emotionally abused in other ways by your partner or ex-partner?: No   Housing Stability: Low Risk  (10/24/2023)    Housing Stability     Do you have housing? : Yes      Are you worried about losing your housing?: No          Review of Systems:   10 point ROS neg other than the symptoms noted above in the HPI.          Physical Exam:   Vitals: There were no vitals taken for this visit.   Wt Readings from Last 4 Encounters:   11/07/23 105.5 kg (232 lb 9.4 oz)   10/25/23 105.6 kg (232 lb 11.2 oz)   10/24/23 105.7 kg (233 lb)   09/05/23 104.3 kg (230 lb)     GEN: well nourished, in no acute distress.  HEENT:  Pupils equal, round. Sclerae nonicteric.   NECK: Supple, no masses appreciated.   C/V:  Regular rate and rhythm, 3/6 systolic murmur  RESP: Respirations are unlabored. Clear to auscultation bilaterally without wheezing, rales, or rhonchi.  GI: Abdomen soft, nontender.  EXTREM: No LE edema.  NEURO: Alert and oriented, cooperative.  SKIN: Warm and dry.        Data:     LIPID RESULTS:  Lab Results   Component Value Date    CHOL 131 04/24/2023    CHOL 138 08/24/2020    HDL 62 04/24/2023    HDL 57 08/24/2020    LDL 47 04/24/2023    LDL 43 08/24/2020    TRIG 112 04/24/2023    TRIG 189 (H) 08/24/2020    CHOLHDLRATIO 1.8 05/06/2015     LIVER ENZYME RESULTS:  Lab Results   Component Value Date    AST 26 04/24/2023    AST 15 08/24/2020    ALT 24 04/24/2023    ALT 30 08/24/2020     CBC RESULTS:  Lab Results   Component Value Date    WBC 5.7 11/07/2023    WBC 4.0 05/06/2015    RBC 4.15 (L) 11/07/2023    RBC 4.04 (L) 05/06/2015    HGB 13.2 (L) 11/07/2023    HGB 12.9 (L) 05/06/2015    HCT 40.5 11/07/2023    HCT 39.3 (L) 05/06/2015    MCV 98 11/07/2023    MCV 97 05/06/2015    MCH 31.8 11/07/2023    MCH 31.9 05/06/2015    MCHC 32.6 11/07/2023    MCHC 32.8 05/06/2015    RDW 14.3 11/07/2023    RDW 13.2 05/06/2015     11/07/2023     05/06/2015     BMP RESULTS:  Lab Results   Component Value Date     (L) 11/09/2023     08/24/2020    POTASSIUM 4.5 11/09/2023    POTASSIUM 4.6 08/05/2021    POTASSIUM 4.1 08/24/2020    CHLORIDE 98 11/09/2023    CHLORIDE 103 08/05/2021     CHLORIDE 105 08/24/2020    CO2 24 11/09/2023    CO2 25 08/05/2021    CO2 26 08/24/2020    ANIONGAP 12 11/09/2023    ANIONGAP 7 08/05/2021    ANIONGAP 7 08/24/2020     (H) 11/09/2023     (H) 08/05/2021     (H) 08/24/2020    BUN 16.9 11/09/2023    BUN 23 08/05/2021    BUN 24 08/24/2020    CR 0.75 11/09/2023    CR 0.95 08/24/2020    GFRESTIMATED >90 11/09/2023    GFRESTIMATED 79 08/24/2020    GFRESTBLACK >90 08/24/2020    EDU 9.0 11/09/2023    EDU 8.5 08/24/2020      A1C RESULTS:  Lab Results   Component Value Date    A1C 7.4 (H) 10/24/2023    A1C 7.9 (H) 01/11/2021     INR RESULTS:  Lab Results   Component Value Date    INR 0.88 11/07/2023          Medications     Current Outpatient Medications   Medication Sig Dispense Refill    aspirin 81 MG tablet Take 1 tablet (81 mg) by mouth daily 90 tablet 11    atorvastatin (LIPITOR) 40 MG tablet Take 1 tablet (40 mg) by mouth at bedtime 90 tablet 11    empagliflozin (JARDIANCE) 25 MG TABS tablet Take 1 tablet (25 mg) by mouth daily 90 tablet 3    glipiZIDE (GLUCOTROL XL) 5 MG 24 hr tablet Take 1 tablet (5 mg) by mouth daily 90 tablet 3    lisinopril (ZESTRIL) 10 MG tablet Take 1 tablet (10 mg) by mouth daily 90 tablet 11    metFORMIN (GLUCOPHAGE) 1000 MG tablet TAKE 1 TABLET BY MOUTH TWICE DAILY WITH MEALS 180 tablet 3    metoprolol succinate ER (TOPROL XL) 25 MG 24 hr tablet Take 0.5 tablets (12.5 mg) by mouth daily 45 tablet 11    multivitamin, therapeutic (THERA-VIT) TABS tablet Take 1 tablet by mouth daily      nitroGLYcerin (NITROSTAT) 0.4 MG sublingual tablet For chest pain place 1 tablet under the tongue every 5 minutes for 3 doses. If symptoms persist 5 minutes after 1st dose call 911. 25 tablet 3    omeprazole 20 MG tablet Take 20 mg by mouth daily.      pioglitazone (ACTOS) 15 MG tablet TAKE 1 TABLET EVERY DAY 90 tablet 3    tamsulosin (FLOMAX) 0.4 MG capsule Take 1 capsule (0.4 mg) by mouth daily 90 capsule 11    triamcinolone (KENALOG) 0.1 %  external cream Apply topically 2 times daily as needed for irritation 80 g 1        Past Medical History     Past Medical History:   Diagnosis Date    Aortic stenosis     BPH (benign prostatic hyperplasia)     Coronary artery disease 01/06/2016    Diverticulosis of large intestine     Essential hypertension 01/06/2016    Gastroesophageal reflux disease     h/o 2019 novel coronavirus disease (COVID-19) 12/18/2020    Prostate cancer     Type 2 diabetes mellitus with diabetic nephropathy 01/06/2016     Past Surgical History:   Procedure Laterality Date    Brachytherapy for prostate cancer  2011    Coronary angiogram with stent placement x2.      CV CORONARY ANGIOGRAM N/A 11/7/2023    Procedure: Coronary Angiogram RADIAL IF ABLE;  Surgeon: Odilon Houser MD;  Location:  [facility]    CV INSTANTANEOUS WAVE-FREE RATIO N/A 11/7/2023    Procedure: Instantaneous Wave-Free Ratio;  Surgeon: Odilon Houser MD;  Location:  [facility]    CYSTOSCOPY      ENT SURGERY       Family History   Problem Relation Age of Onset    Alzheimer Disease Mother     Cancer Father     Diabetes No family hx of     Coronary Artery Disease No family hx of     Hypertension No family hx of     Hyperlipidemia No family hx of     Cerebrovascular Disease No family hx of     Breast Cancer No family hx of     Colon Cancer No family hx of     Prostate Cancer No family hx of     Other Cancer No family hx of     Depression No family hx of     Anxiety Disorder No family hx of     Mental Illness No family hx of     Substance Abuse No family hx of     Anesthesia Reaction No family hx of     Asthma No family hx of     Osteoporosis No family hx of     Genetic Disorder No family hx of     Thyroid Disease No family hx of     Obesity No family hx of     Unknown/Adopted No family hx of           Allergies   Patient has no known allergies.    30 minutes spent on the date of the encounter doing chart review, history and exam,  documentation and further activities as noted above      DORINA Bowie [facility]  Pager: [phone]

## 2023-12-15 NOTE — LETTER
12/15/2023    Jesus Cherry MD  [address] [place] [address]    RE: Héctor [place]       Dear Colleague,     I had the pleasure of seeing Héctor [place] in the [facility].  Cardiology Clinic Progress Note  Héctor [place] MRN# [id number]   YOB: 1950 Age: 73 year old   Primary Cardiologist: Dr. Cabrera  Reason for visit: Cardiology follow up            Assessment and Plan:   Héctor [place] is a very pleasant 73 year old male here for cardiology follow up.     1. Coronary artery disease - s/p BMS to RCA 1996, BMS x2 to RCA near prior stent 2002   - cMRI 12/2021 showed small sized area of mild mid inferolateral ischemia that had been medically managed.     - coronary angiogram 11/2023 40-50% of LAD (IFR 1.0), circumflex small vessel with  after M1 (too small for intervention), distal vessel fills via collateral, RCA distal irregular 50% (IFR 0.95).   2. Hypertension  3. Hyperlipidemia  4. Moderate aortic stenosis  5. Obesity  6. DM type II    I saw patient today for post angiogram follow up, he underwent a coronary angiogram in November due to progressive  dyspnea on exertion, chest pain, concerning for accelerating angina. Angiogram showed 40-50% of LAD (IFR 1.0), circumflex small vessel with  after M1 (too small for intervention), distal vessel fills via collateral, RCA distal irregular 50% (IFR 0.95), noting appears symptoms are unlikely related to ischemia.      He has been doing well since procedure, reporting no recurrent chest pain and mild exertional dyspnea with strenuous activity. He has been trying to get into a more consistent exercise routine, praise given.     No medication changes today, reviewed s/s of when to notify clinic.     Changes today: none    Follow up plan:     Follow up with Dr. Cabrera fall 2024 with echocardiogram prior         History of Presenting Illness:    Héctor [place] is a very pleasant 73 year old male with a history of coronary  artery disease with NSTEMI s/p PCI (BMS to RCA 1996, BMS x2 to RCA near prior stent 2002), hypertension, hyperlipidemia, diabetes, obesity and aortic stenosis.     Patient presented with unstable angina in 1996, he underwent coronary angiogram with BMS placement to RCA. In 2002 he had another NSTEMI and underwent 2 overlapping 4 mm bare-metal stents in the RCA near the prior stent.     Cardiac stress MRI 12/7/2021 showed LVEF 70%, moderate aortic stenosis and small size mild mid inferolateral ischemia on regadenoson stress perfusion. Mild ischemia medically managed.     Echo 10/5/23 showed LVEF 60-65%, moderate aortic stenosis, valve area 1.1cm2, mean AoV pressure gradient 22mmHg, no significant change from prior study.     During his last OV with Dr. Cabrera in October 2023 he noted progressive dyspnea on exertion, chest pain, concerning for accelerating angina. Recommended urgent coronary angiogram which he underwent 11/7/23, which demonstrated 40-50% of LAD (IFR 1.0), circumflex small vessel with  after M1 (too small for intervention), distal vessel fills via collateral, RCA distal irregular 50% (IFR 0.95), noting appears symptoms are unlikely related to ischemia.      Patient is here today for cardiology follow up.     Patient reports feeling good. Denies chest pain or chest tightness. Denies shortness of breath a rest. Some exertional dyspnea with stairs or carrying heavy things. Denies orthopnea or PND. Denies LE edema. Denies dizziness, lightheadedness or other presyncopal symptoms. Denies falls. Denies syncope. Denies tachycardia or palpitations. Taking medications daily.     Labs from November showed stable kidney function and electrolytes. Blood pressure 124/60 and HR 82 in clinic today.    Appetite good. Eating meals at home and out, but mostly at home. Walking every other day for 15 mins. Denies tobacco use. A couple beers most days.         Social History      Social History     Socioeconomic History     Marital status:      Spouse name: Not on file    Number of children: Not on file    Years of education: Not on file    Highest education level: Not on file   Occupational History    Not on file   Tobacco Use    Smoking status: Former     Types: Cigarettes     Quit date: 2002     Years since quittin.3    Smokeless tobacco: Never   Vaping Use    Vaping Use: Never used   Substance and Sexual Activity    Alcohol use: Yes     Alcohol/week: 3.0 standard drinks of alcohol     Types: 3 Cans of beer per week     Comment: few beers a day    Drug use: No    Sexual activity: Not Currently     Partners: Female   Other Topics Concern    Parent/sibling w/ CABG, MI or angioplasty before 65F 55M? No   Social History Narrative    Not on file     Social Determinants of Health     Financial Resource Strain: Low Risk  (10/24/2023)    Financial Resource Strain     Within the past 12 months, have you or your family members you live with been unable to get utilities (heat, electricity) when it was really needed?: No   Food Insecurity: Low Risk  (10/24/2023)    Food Insecurity     Within the past 12 months, did you worry that your food would run out before you got money to buy more?: No     Within the past 12 months, did the food you bought just not last and you didn t have money to get more?: No   Transportation Needs: Low Risk  (10/24/2023)    Transportation Needs     Within the past 12 months, has lack of transportation kept you from medical appointments, getting your medicines, non-medical meetings or appointments, work, or from getting things that you need?: No   Physical Activity: Not on file   Stress: Not on file   Social Connections: Not on file   Interpersonal Safety: Low Risk  (10/24/2023)    Interpersonal Safety     Do you feel physically and emotionally safe where you currently live?: Yes     Within the past 12 months, have you been hit, slapped, kicked or otherwise physically hurt by someone?: No     Within the  past 12 months, have you been humiliated or emotionally abused in other ways by your partner or ex-partner?: No   Housing Stability: Low Risk  (10/24/2023)    Housing Stability     Do you have housing? : Yes     Are you worried about losing your housing?: No          Review of Systems:   10 point ROS neg other than the symptoms noted above in the HPI.          Physical Exam:   Vitals: There were no vitals taken for this visit.   Wt Readings from Last 4 Encounters:   11/07/23 105.5 kg (232 lb 9.4 oz)   10/25/23 105.6 kg (232 lb 11.2 oz)   10/24/23 105.7 kg (233 lb)   09/05/23 104.3 kg (230 lb)     GEN: well nourished, in no acute distress.  HEENT:  Pupils equal, round. Sclerae nonicteric.   NECK: Supple, no masses appreciated.   C/V:  Regular rate and rhythm, 3/6 systolic murmur  RESP: Respirations are unlabored. Clear to auscultation bilaterally without wheezing, rales, or rhonchi.  GI: Abdomen soft, nontender.  EXTREM: No LE edema.  NEURO: Alert and oriented, cooperative.  SKIN: Warm and dry.        Data:     LIPID RESULTS:  Lab Results   Component Value Date    CHOL 131 04/24/2023    CHOL 138 08/24/2020    HDL 62 04/24/2023    HDL 57 08/24/2020    LDL 47 04/24/2023    LDL 43 08/24/2020    TRIG 112 04/24/2023    TRIG 189 (H) 08/24/2020    CHOLHDLRATIO 1.8 05/06/2015     LIVER ENZYME RESULTS:  Lab Results   Component Value Date    AST 26 04/24/2023    AST 15 08/24/2020    ALT 24 04/24/2023    ALT 30 08/24/2020     CBC RESULTS:  Lab Results   Component Value Date    WBC 5.7 11/07/2023    WBC 4.0 05/06/2015    RBC 4.15 (L) 11/07/2023    RBC 4.04 (L) 05/06/2015    HGB 13.2 (L) 11/07/2023    HGB 12.9 (L) 05/06/2015    HCT 40.5 11/07/2023    HCT 39.3 (L) 05/06/2015    MCV 98 11/07/2023    MCV 97 05/06/2015    MCH 31.8 11/07/2023    MCH 31.9 05/06/2015    MCHC 32.6 11/07/2023    MCHC 32.8 05/06/2015    RDW 14.3 11/07/2023    RDW 13.2 05/06/2015     11/07/2023     05/06/2015     BMP RESULTS:  Lab Results    Component Value Date     (L) 11/09/2023     08/24/2020    POTASSIUM 4.5 11/09/2023    POTASSIUM 4.6 08/05/2021    POTASSIUM 4.1 08/24/2020    CHLORIDE 98 11/09/2023    CHLORIDE 103 08/05/2021    CHLORIDE 105 08/24/2020    CO2 24 11/09/2023    CO2 25 08/05/2021    CO2 26 08/24/2020    ANIONGAP 12 11/09/2023    ANIONGAP 7 08/05/2021    ANIONGAP 7 08/24/2020     (H) 11/09/2023     (H) 08/05/2021     (H) 08/24/2020    BUN 16.9 11/09/2023    BUN 23 08/05/2021    BUN 24 08/24/2020    CR 0.75 11/09/2023    CR 0.95 08/24/2020    GFRESTIMATED >90 11/09/2023    GFRESTIMATED 79 08/24/2020    GFRESTBLACK >90 08/24/2020    EDU 9.0 11/09/2023    EDU 8.5 08/24/2020      A1C RESULTS:  Lab Results   Component Value Date    A1C 7.4 (H) 10/24/2023    A1C 7.9 (H) 01/11/2021     INR RESULTS:  Lab Results   Component Value Date    INR 0.88 11/07/2023          Medications     Current Outpatient Medications   Medication Sig Dispense Refill    aspirin 81 MG tablet Take 1 tablet (81 mg) by mouth daily 90 tablet 11    atorvastatin (LIPITOR) 40 MG tablet Take 1 tablet (40 mg) by mouth at bedtime 90 tablet 11    empagliflozin (JARDIANCE) 25 MG TABS tablet Take 1 tablet (25 mg) by mouth daily 90 tablet 3    glipiZIDE (GLUCOTROL XL) 5 MG 24 hr tablet Take 1 tablet (5 mg) by mouth daily 90 tablet 3    lisinopril (ZESTRIL) 10 MG tablet Take 1 tablet (10 mg) by mouth daily 90 tablet 11    metFORMIN (GLUCOPHAGE) 1000 MG tablet TAKE 1 TABLET BY MOUTH TWICE DAILY WITH MEALS 180 tablet 3    metoprolol succinate ER (TOPROL XL) 25 MG 24 hr tablet Take 0.5 tablets (12.5 mg) by mouth daily 45 tablet 11    multivitamin, therapeutic (THERA-VIT) TABS tablet Take 1 tablet by mouth daily      nitroGLYcerin (NITROSTAT) 0.4 MG sublingual tablet For chest pain place 1 tablet under the tongue every 5 minutes for 3 doses. If symptoms persist 5 minutes after 1st dose call 473. 25 tablet 3    omeprazole 20 MG tablet Take 20 mg by  mouth daily.      pioglitazone (ACTOS) 15 MG tablet TAKE 1 TABLET EVERY DAY 90 tablet 3    tamsulosin (FLOMAX) 0.4 MG capsule Take 1 capsule (0.4 mg) by mouth daily 90 capsule 11    triamcinolone (KENALOG) 0.1 % external cream Apply topically 2 times daily as needed for irritation 80 g 1        Past Medical History     Past Medical History:   Diagnosis Date    Aortic stenosis     BPH (benign prostatic hyperplasia)     Coronary artery disease 01/06/2016    Diverticulosis of large intestine     Essential hypertension 01/06/2016    Gastroesophageal reflux disease     h/o 2019 novel coronavirus disease (COVID-19) 12/18/2020    Prostate cancer     Type 2 diabetes mellitus with diabetic nephropathy 01/06/2016     Past Surgical History:   Procedure Laterality Date    Brachytherapy for prostate cancer  2011    Coronary angiogram with stent placement x2.      CV CORONARY ANGIOGRAM N/A 11/7/2023    Procedure: Coronary Angiogram RADIAL IF ABLE;  Surgeon: Odilon Houser MD;  Location:  [facility] CATH LAB    CV INSTANTANEOUS WAVE-FREE RATIO N/A 11/7/2023    Procedure: Instantaneous Wave-Free Ratio;  Surgeon: Odilon Houser MD;  Location:  [facility]ATH [facility]    CYSTOSCOPY      ENT SURGERY       Family History   Problem Relation Age of Onset    Alzheimer Disease Mother     Cancer Father     Diabetes No family hx of     Coronary Artery Disease No family hx of     Hypertension No family hx of     Hyperlipidemia No family hx of     Cerebrovascular Disease No family hx of     Breast Cancer No family hx of     Colon Cancer No family hx of     Prostate Cancer No family hx of     Other Cancer No family hx of     Depression No family hx of     Anxiety Disorder No family hx of     Mental Illness No family hx of     Substance Abuse No family hx of     Anesthesia Reaction No family hx of     Asthma No family hx of     Osteoporosis No family hx of     Genetic Disorder No family hx of     Thyroid Disease No family hx  of     Obesity No family hx of     Unknown/Adopted No family hx of           Allergies   Patient has no known allergies.    30 minutes spent on the date of the encounter doing chart review, history and exam, documentation and further activities as noted above      DORINA Bowie CNP  [facility]  Pager: [phone]      Thank you for allowing me to participate in the care of your patient.      Sincerely,     DORINA Bowie CNP     [facility]  cc:   Zac Cabrera MD  [address], [address]  [place] [address]

## 2023-12-15 NOTE — PATIENT INSTRUCTIONS
No medications changes   Follow up with Dr. Cabrera in the fall with an echo prior   Please call or [employer] message with any questions/concerns, [phone]

## 2024-01-12 NOTE — LETTER
10/5/2022    Jesus Cherry MD  [address] [place] [address]    RE: Héctor PEREZ Charlesrolfosman       Dear Colleague,     I had the pleasure of seeing Héctor [place] in the [facility].      Cardiology Clinic Progress Note:    October 5, 2022   Patient Name: Héctor [place]  Patient MRN: [id number]     Consult indication: aortic stenosis    HPI:    I had the opportunity to see patient Héctor [place] in cardiology clinic for a follow up visit. Patient is followed by our colleague Jesus Cherry MD with Primary Care.     As you know, patient is a pleasant 72-year-old male with a past medical history significant for coronary artery disease with NSTEMI status post PCI (BMS to RCA 1996, BMS x2 to RCA near prior stent 2002), hypertension, hyperlipidemia, diabetes, obesity, aortic stenosis, who presents for further evaluation and management of aortic stenosis.     Patient has a remote history of coronary artery disease.  In 1996, he presented with unstable angina resulting in a small NSTEMI.  He underwent cardiac catheterization with bare-metal stent placement to the RCA.  In 2002, he had another NSTEMI, for which he had 2 overlapping 4 mm bare-metal stents in the RCA near the prior stent.     Since then, he had been doing reasonably well, though he does note that over the past year or so, he has had worsening shortness of breath with exertion.  He denies any chest pain or chest pressure.  He denies any symptoms of orthopnea/PND or abnormal lower extremity swelling.     He was seen by his primary care physician, Dr. Cherry, and on exam was noted to have a cardiac murmur.  Patient underwent a TTE on 8/6/2021 that demonstrated normal biventricular function, moderate aortic stenosis, Vmax 2.4 m/s, mean gradient 13 mmHg, ARMANDO 1.4cm2, DI 0.38. SVi 35 cc/m2.    I had seen him in clinic for a consultation on 11/17/2021.  At that time, he was experiencing some dyspnea on exertion.  For further evaluation  patient was assessed with a cardiac stress MRI 2021 that demonstrated normal biventricular function, moderate aortic stenosis, small sized area of mild mid inferolateral ischemia.  We discussed options for further evaluation/management, cardiac catheterization versus continued medical management.  Patient wanted to proceed with medical management.  We also increased atorvastatin to 40 mg nightly.  Follow-up TTE 10/3/2022 demonstrates normal biventricular function, moderate aortic stenosis, V-max 3.1 m/s, mean gradient 21 mmHg, aortic valve area 1.2 cm , DI 0.31.    Overall, patient reports that he has been doing well.  He denies any chest pain, chest pressure, abnormal shortness of breath.  He was recently in [place] a couple of months ago, coincidentally when the [facility] .  He did note some mild dyspnea however this was not particularly bothersome.    Prior symptoms of angina were significant left-sided chest pain with radiation to his arm, which was consistent from his first MI in , and his second MI in .  He has not had recurrence of the symptoms since then.    Reviewed last lipids, favorable, LDL at goal of less than 70.    ECG 2021 demonstrates normal sinus rhythm at 78 bpm, borderline ME interval 190 ms, incomplete right bundle branch block, nonspecific ST segment changes.   ms.    Assessment and Plan/Recommendations:    # CAD, with NSTEMI status post PCI (BMS to RCA , BMS x2 to RCA near prior stent ), CMR stress test 2021 demonstrates small sized area of mild mid inferolateral ischemia, medically managed.   # Moderate aortic stenosis, degenerative/non-rheumatic,  V-max 3.1 m/s, mean gradient 21 mmHg, aortic valve area 1.2 cm , DI 0.31.  # HTN, well controlled  # HL, LDL at goal <70  # DM2  # Obesity    Overall patient is in stable cardiovascular health without symptoms for angina or decompensated heart failure.  Aortic stenosis is stable.    - Reviewed cardiac stress MRI,  discussed option of medical management versus cardiac catheterization.  Since patient is feeling well, without symptoms of angina, will continue medical management and close monitoring.  - TTE in 1 year  - Continue aspirin, metoprolol succinate, lisinopril, atorvastatin, sublingual nitroglycerin as needed  - Follow-up in 1 year or sooner as needed    Thank you for allowing our team to participate in the care of Héctor [place].  Please do not hesitate to call or page me with any questions or concerns.    Sincerely,     Zac Cabrera MD, [facility]  Cardiology  Text Page   October 5, 2022    Voice recognition software utilized.     Total time spent on this encounter: 30 minutes, providing care in this encounter including, but not limited to, reviewing prior medical records, laboratory data, imaging studies, diagnostic studies, procedure notes, formulating an assessment and plan, recommendations, discussion and counseling with patient face to face, dictation.    Past Medical History:     Past Medical History:   Diagnosis Date     2019 novel coronavirus disease (COVID-19) 12/18/2020     Coronary artery disease involving native coronary artery of native heart without angina pectoris 1/6/2016     Essential hypertension 1/6/2016     Type 2 diabetes mellitus with diabetic nephropathy (H) 1/6/2016        Past Surgical History:   Past Surgical History:   Procedure Laterality Date     BRACHYTHERAPY RADIOELEMENTS  2011    brachytherapy for prostate ca     CYSTOSCOPY       ENT SURGERY       STENT, CORONARY, LUIS M      h/o coronary stent x 2       Medications (outpatient):  Current Outpatient Medications   Medication Sig Dispense Refill     aspirin 81 MG tablet Take 1 tablet (81 mg) by mouth daily 90 tablet 11     atorvastatin (LIPITOR) 40 MG tablet Take 1 tablet (40 mg) by mouth At Bedtime 90 tablet 3     blood glucose (NO BRAND SPECIFIED) test strip Use to test blood sugar 1 times daily or as directed. 100 each 11      blood glucose calibration (NO BRAND SPECIFIED) solution Use to calibrate blood glucose monitor as needed as directed. 1 each 11     blood glucose monitoring (NO BRAND SPECIFIED) meter device kit Use to test blood sugar 1 times daily or as directed. 1 kit 0     empagliflozin (JARDIANCE) 25 MG TABS tablet Take 1 tablet (25 mg) by mouth daily 90 tablet 3     glipiZIDE (GLUCOTROL XL) 5 MG 24 hr tablet Take 1 tablet (5 mg) by mouth daily 90 tablet 0     lisinopril (ZESTRIL) 10 MG tablet Take 1 tablet (10 mg) by mouth daily 90 tablet 11     metFORMIN (GLUCOPHAGE) 1000 MG tablet TAKE 1 TABLET BY MOUTH TWICE DAILY WITH MEALS 180 tablet 1     metoprolol succinate ER (TOPROL-XL) 25 MG 24 hr tablet Take 0.5 tablets (12.5 mg) by mouth daily 45 tablet 3     multivitamin, therapeutic (THERA-VIT) TABS tablet Take 1 tablet by mouth daily       nitroGLYcerin (NITROSTAT) 0.4 MG sublingual tablet For chest pain place 1 tablet under the tongue every 5 minutes for 3 doses. If symptoms persist 5 minutes after 1st dose call 911. 20 tablet 3     omeprazole 20 MG tablet Take 20 mg by mouth daily.       pioglitazone (ACTOS) 15 MG tablet TAKE 1 TABLET EVERY DAY 90 tablet 1     tamsulosin (FLOMAX) 0.4 MG capsule Take 1 capsule (0.4 mg) by mouth daily 90 capsule 0     thin (NO BRAND SPECIFIED) lancets Use to test blood sugar 1 times daily or as directed. 100 each 11     triamcinolone (KENALOG) 0.1 % external cream Apply topically 2 times daily as needed for irritation 80 g 1       Allergies:  No Known Allergies    Social History:   History   Drug Use No      History   Smoking Status     Former Smoker     Types: Cigarettes     Quit date: 8/2/2002   Smokeless Tobacco     Never Used     Social History    Substance and Sexual Activity      Alcohol use: Yes        Alcohol/week: 3.0 standard drinks        Types: 3 Cans of beer per week       Family History:  Family History   Problem Relation Age of Onset     Alzheimer Disease Mother      Cancer  "Father      Diabetes No family hx of      Coronary Artery Disease No family hx of      Hypertension No family hx of      Hyperlipidemia No family hx of      Cerebrovascular Disease No family hx of      Breast Cancer No family hx of      Colon Cancer No family hx of      Prostate Cancer No family hx of      Other Cancer No family hx of      Depression No family hx of      Anxiety Disorder No family hx of      Mental Illness No family hx of      Substance Abuse No family hx of      Anesthesia Reaction No family hx of      Asthma No family hx of      Osteoporosis No family hx of      Genetic Disorder No family hx of      Thyroid Disease No family hx of      Obesity No family hx of      Unknown/Adopted No family hx of        Review of Systems:   A complete review of systems was negative except as mentioned in the History of Present Illness.     Objective & Physical Exam:  /62   Pulse 78   Ht 1.753 m (5' 9\")   Wt 107.6 kg (237 lb 3.2 oz)   SpO2 96%   BMI 35.03 kg/m    Wt Readings from Last 2 Encounters:   10/05/22 107.6 kg (237 lb 3.2 oz)   04/18/22 108.4 kg (239 lb)     Body mass index is 35.03 kg/m .   Body surface area is 2.29 meters squared.    Constitutional: appears stated age, in no apparent distress, appears to be well nourished  Head: normocephalic, atraumatic  Neck: supple, trachea midline   Pulmonary: clear to auscultation bilaterally, no wheezes, no rales, no increased work of breathing  Cardiovascular: JVP normal, regular rate, regular rhythm, 2/6 DEBBIE at the RUSB, no lower extremity edema  Gastrointestinal: no guarding, non-rigid   Neurologic: awake, alert, moves all extremities  Skin: no jaundice, warm on limited exam  Psychiatric: affect is normal, answers questions appropriately, oriented to self and place    Data reviewed:  Lab Results   Component Value Date    WBC 4.0 05/06/2015    RBC 4.04 (L) 05/06/2015    HGB 12.9 (L) 05/06/2015    HCT 39.3 (L) 05/06/2015    MCV 97 05/06/2015    MCH 31.9 " 05/06/2015    MCHC 32.8 05/06/2015    RDW 13.2 05/06/2015     05/06/2015     Sodium   Date Value Ref Range Status   08/05/2021 135 133 - 144 mmol/L Final   08/24/2020 138 133 - 144 mmol/L Final     Potassium   Date Value Ref Range Status   08/05/2021 4.6 3.4 - 5.3 mmol/L Final   08/24/2020 4.1 3.4 - 5.3 mmol/L Final     Chloride   Date Value Ref Range Status   08/05/2021 103 94 - 109 mmol/L Final   08/24/2020 105 94 - 109 mmol/L Final     Carbon Dioxide   Date Value Ref Range Status   08/24/2020 26 20 - 32 mmol/L Final     Carbon Dioxide (CO2)   Date Value Ref Range Status   08/05/2021 25 20 - 32 mmol/L Final     Anion Gap   Date Value Ref Range Status   08/05/2021 7 3 - 14 mmol/L Final   08/24/2020 7 3 - 14 mmol/L Final     Glucose   Date Value Ref Range Status   08/05/2021 140 (H) 70 - 99 mg/dL Final   08/24/2020 146 (H) 70 - 99 mg/dL Final     Comment:     Fasting specimen     Urea Nitrogen   Date Value Ref Range Status   08/05/2021 23 7 - 30 mg/dL Final   08/24/2020 24 7 - 30 mg/dL Final     Creatinine   Date Value Ref Range Status   08/05/2021 1.05 0.66 - 1.25 mg/dL Final   08/24/2020 0.95 0.66 - 1.25 mg/dL Final     GFR Estimate   Date Value Ref Range Status   08/05/2021 70 >60 mL/min/1.73m2 Final     Comment:     As of July 11, 2021, eGFR is calculated by the CKD-EPI creatinine equation, without race adjustment. eGFR can be influenced by muscle mass, exercise, and diet. The reported eGFR is an estimation only and is only applicable if the renal function is stable.   08/24/2020 79 >60 mL/min/[1.73_m2] Final     Comment:     Non  GFR Calc  Starting 12/18/2018, serum creatinine based estimated GFR (eGFR) will be   calculated using the Chronic Kidney Disease Epidemiology Collaboration   (CKD-EPI) equation.       Calcium   Date Value Ref Range Status   08/05/2021 9.3 8.5 - 10.1 mg/dL Final   08/24/2020 8.5 8.5 - 10.1 mg/dL Final     Bilirubin Total   Date Value Ref Range Status   08/05/2021  0.5 0.2 - 1.3 mg/dL Final   2020 0.4 0.2 - 1.3 mg/dL Final     Alkaline Phosphatase   Date Value Ref Range Status   2021 59 40 - 150 U/L Final   2020 69 40 - 150 U/L Final     ALT   Date Value Ref Range Status   2022 30 0 - 70 U/L Final   2020 30 0 - 70 U/L Final     AST   Date Value Ref Range Status   2021 14 0 - 45 U/L Final   2020 15 0 - 45 U/L Final     Recent Labs   Lab Test 22  0921 21  0800 16  1033 05/06/15  0908   CHOL 129 150   < > 131   HDL 63 69   < > 71   LDL 38 48   < > 50   TRIG 140 165*   < > 50   CHOLHDLRATIO  --   --   --  1.8    < > = values in this interval not displayed.      Lab Results   Component Value Date    A1C 6.8 2021    A1C 7.9 2021    A1C 7.3 2020    A1C 8.4 10/31/2019    A1C 7.5 2019    A1C 7.2 2018        Recent Results (from the past 4320 hour(s))   Echocardiogram Complete   Result Value    LVEF  60%    Narrative    [id number]  [id number]  [id number]  [id number]^CHUCHO^LIDIA^TIFFANI     [facility]  Echocardiography Laboratory  [address] [place] [address]     Name: LUDMILA CODY  MRN: [id number]  : 1950  Study Date: 10/03/2022 09:49 AM  Age: 72 yrs  Gender: Male  Patient Location: [facility]  Reason For Study: Aortic stenosis, moderate  Ordering Physician: LIDIA RANKIN  Referring Physician: LIDIA RANKIN  Performed By: MARGUERITE Avendano     BSA: 2.2 m2  Height: 70 in  Weight: 230 lb  HR: 75  BP: 126/74 mmHg  ______________________________________________________________________________  Procedure  Complete Echo Adult. Optison (NDC #[id number]) given intravenously.  ______________________________________________________________________________  Interpretation Summary     1. The left ventricle is normal in structure, function and size. The visual  ejection fraction is estimated at 60%.  2. The right ventricle is normal in structure, function and size.  3. Moderate valvular  aortic stenosis. Mean 21mmHg, Vmax 3.1m/s, ARMANDO 1.2cm2, DI  0.31.     Echo 2021 showed EF 60%, AS with mean 13mmHg, Vmax 2.4m/s, ARMANDO 1.4cm2.  ______________________________________________________________________________  Left Ventricle  The left ventricle is normal in structure, function and size. There is normal  left ventricular wall thickness. The visual ejection fraction is estimated at  60%. Left ventricular diastolic function is normal. Normal left ventricular  wall motion.     Right Ventricle  The right ventricle is normal in structure, function and size.     Atria  Normal left atrial size. Right atrial size is normal. There is no atrial shunt  seen.     Mitral Valve  The mitral valve is normal in structure and function.     Tricuspid Valve  No tricuspid regurgitation. Right ventricular systolic pressure could not be  approximated due to inadequate tricuspid regurgitation.     Aortic Valve  Moderate valvular aortic stenosis. Mean 21mmHg, Vmax 3.1m/s, ARMANDO 1.2cm2, DI  0.31.     Pulmonic Valve  The pulmonic valve is normal in structure and function.     Vessels  Normal ascending, transverse (arch), and descending aorta. The inferior vena  cava was normal in size with preserved respiratory variability.     Pericardium  There is no pericardial effusion.     Rhythm  Sinus rhythm was noted.  ______________________________________________________________________________  MMode/2D Measurements & Calculations  IVSd: 1.0 cm     LVIDd: 4.5 cm  LVIDs: 3.3 cm  LVPWd: 1.0 cm  FS: 27.3 %  LV mass(C)d: 159.6 grams  LV mass(C)dI: 72.0 grams/m2  Ao root diam: 3.6 cm  LA dimension: 4.6 cm  asc Aorta Diam: 3.1 cm  LA/Ao: 1.3  LVOT diam: 2.2 cm  LVOT area: 3.8 cm2  LA Volume (BP): 56.8 ml  LA Volume Index (BP): 25.6 ml/m2  RWT: 0.46     Doppler Measurements & Calculations  MV E max bebeto: 62.6 cm/sec  MV A max bebeto: 94.1 cm/sec  MV E/A: 0.67  MV max P.8 mmHg  MV mean P.7 mmHg  MV V2 VTI: 24.1 cm  MVA(VTI): 3.3 cm2  MV dec  time: 0.22 sec  Ao V2 max: 307.4 cm/sec  Ao max P.0 mmHg  Ao V2 mean: 215.0 cm/sec  Ao mean P.3 mmHg  Ao V2 VTI: 65.0 cm  ARMANDO(I,D): 1.2 cm2  ARMANDO(V,D): 1.2 cm2  LV V1 max PG: 3.6 mmHg  LV V1 max: 95.1 cm/sec  LV V1 VTI: 21.2 cm  SV(LVOT): 79.5 ml  SI(LVOT): 35.9 ml/m2  PA acc time: 0.08 sec  AV Jorge Luis Ratio (DI): 0.31  ARMANDO Index (cm2/m2): 0.55  E/E' av.8  Lateral E/e': 9.3  Medial E/e': 10.3     ______________________________________________________________________________  Report approved by: Marisol Segovia 10/03/2022 11:05 AM              Thank you for allowing me to participate in the care of your patient.      Sincerely,     Zac Cabrera MD     [facility]  cc:   Zac Cabrera MD  [address] [facility] [address]  [place] [address]         Spontaneous, unlabored and symmetrical

## 2024-02-15 ENCOUNTER — TELEPHONE (OUTPATIENT)
Dept: CARDIOLOGY | Facility: CLINIC | Age: 74
End: 2024-02-15
Payer: COMMERCIAL

## 2024-02-15 DIAGNOSIS — E78.5 HYPERLIPIDEMIA WITH TARGET LDL LESS THAN 70: ICD-10-CM

## 2024-02-15 RX ORDER — ATORVASTATIN CALCIUM 40 MG/1
40 TABLET, FILM COATED ORAL AT BEDTIME
Qty: 90 TABLET | Refills: 2 | Status: SHIPPED | OUTPATIENT
Start: 2024-02-15

## 2024-02-15 NOTE — TELEPHONE ENCOUNTER
[facility] Call Center    Phone Message    May a detailed message be left on voicemail: yes     Reason for Call: Medication Refill Request    Has the patient contacted the pharmacy for the refill? Yes   Name of medication being requested: atorvastatin (LIPITOR) 40 MG tablet   Provider who prescribed the medication: Jo Ann  Pharmacy:    [id number] - [place], [place] - [address]  Date medication is needed: 02/15/24    **Andrew only has a few pills left and [place], 2[place]19**    Action Taken: Other: Cardiology    Travel Screening: Not Applicable    Thank you!  [facility]

## 2024-03-16 ENCOUNTER — HEALTH MAINTENANCE LETTER (OUTPATIENT)
Age: 74
End: 2024-03-16

## 2024-04-24 ENCOUNTER — OFFICE VISIT (OUTPATIENT)
Dept: PEDIATRICS | Facility: CLINIC | Age: 74
End: 2024-04-24
Payer: COMMERCIAL

## 2024-04-24 VITALS
WEIGHT: 240.1 LBS | TEMPERATURE: 97.6 F | HEART RATE: 88 BPM | SYSTOLIC BLOOD PRESSURE: 120 MMHG | BODY MASS INDEX: 35.56 KG/M2 | OXYGEN SATURATION: 96 % | RESPIRATION RATE: 16 BRPM | HEIGHT: 69 IN | DIASTOLIC BLOOD PRESSURE: 60 MMHG

## 2024-04-24 DIAGNOSIS — I25.10 CORONARY ARTERY DISEASE INVOLVING NATIVE CORONARY ARTERY OF NATIVE HEART WITHOUT ANGINA PECTORIS: ICD-10-CM

## 2024-04-24 DIAGNOSIS — C61 PROSTATE CANCER (H): ICD-10-CM

## 2024-04-24 DIAGNOSIS — E11.65 TYPE 2 DIABETES MELLITUS WITH HYPERGLYCEMIA, WITHOUT LONG-TERM CURRENT USE OF INSULIN (H): Primary | ICD-10-CM

## 2024-04-24 DIAGNOSIS — E78.5 HYPERLIPIDEMIA WITH TARGET LDL LESS THAN 70: ICD-10-CM

## 2024-04-24 DIAGNOSIS — I10 ESSENTIAL HYPERTENSION WITH GOAL BLOOD PRESSURE LESS THAN 140/90: ICD-10-CM

## 2024-04-24 PROBLEM — E66.812 CLASS 2 SEVERE OBESITY DUE TO EXCESS CALORIES WITH SERIOUS COMORBIDITY IN ADULT (H): Status: ACTIVE | Noted: 2024-04-24

## 2024-04-24 PROBLEM — E66.01 CLASS 2 SEVERE OBESITY DUE TO EXCESS CALORIES WITH SERIOUS COMORBIDITY IN ADULT (H): Status: ACTIVE | Noted: 2024-04-24

## 2024-04-24 LAB — HBA1C MFR BLD: 7.2 % (ref 0–5.6)

## 2024-04-24 PROCEDURE — 83036 HEMOGLOBIN GLYCOSYLATED A1C: CPT | Performed by: INTERNAL MEDICINE

## 2024-04-24 PROCEDURE — 84153 ASSAY OF PSA TOTAL: CPT | Performed by: INTERNAL MEDICINE

## 2024-04-24 PROCEDURE — 99214 OFFICE O/P EST MOD 30 MIN: CPT | Performed by: INTERNAL MEDICINE

## 2024-04-24 PROCEDURE — 36415 COLL VENOUS BLD VENIPUNCTURE: CPT | Performed by: INTERNAL MEDICINE

## 2024-04-24 PROCEDURE — 80061 LIPID PANEL: CPT | Performed by: INTERNAL MEDICINE

## 2024-04-24 PROCEDURE — 80053 COMPREHEN METABOLIC PANEL: CPT | Performed by: INTERNAL MEDICINE

## 2024-04-24 RX ORDER — PIOGLITAZONEHYDROCHLORIDE 15 MG/1
TABLET ORAL
Qty: 90 TABLET | Refills: 3 | Status: SHIPPED | OUTPATIENT
Start: 2024-04-24

## 2024-04-24 RX ORDER — TAMSULOSIN HYDROCHLORIDE 0.4 MG/1
0.4 CAPSULE ORAL DAILY
Qty: 90 CAPSULE | Refills: 11 | Status: SHIPPED | OUTPATIENT
Start: 2024-04-24

## 2024-04-24 RX ORDER — GLIPIZIDE 5 MG/1
5 TABLET, FILM COATED, EXTENDED RELEASE ORAL DAILY
Qty: 90 TABLET | Refills: 3 | Status: SHIPPED | OUTPATIENT
Start: 2024-04-24

## 2024-04-24 RX ORDER — LISINOPRIL 10 MG/1
10 TABLET ORAL DAILY
Qty: 90 TABLET | Refills: 11 | Status: SHIPPED | OUTPATIENT
Start: 2024-04-24

## 2024-04-24 RX ORDER — RESPIRATORY SYNCYTIAL VIRUS VACCINE 120MCG/0.5
0.5 KIT INTRAMUSCULAR ONCE
Qty: 1 EACH | Refills: 0 | Status: CANCELLED | OUTPATIENT
Start: 2024-04-24 | End: 2024-04-24

## 2024-04-24 ASSESSMENT — PAIN SCALES - GENERAL: PAINLEVEL: NO PAIN (0)

## 2024-04-24 NOTE — PROGRESS NOTES
Assessment & Plan     (E11.65) Type 2 diabetes mellitus with hyperglycemia, without long-term current use of insulin (H)  (primary encounter diagnosis)  Comment: well controlled  Plan: HEMOGLOBIN A1C, empagliflozin (JARDIANCE) 25 MG        TABS tablet, glipiZIDE (GLUCOTROL XL) 5 MG 24         hr tablet, metFORMIN (GLUCOPHAGE) 1000 MG         tablet, pioglitazone (ACTOS) 15 MG tablet          (I25.10) Coronary artery disease involving native coronary artery of native heart without angina pectoris  Comment:   stable, hx of 2 prior stents.   Continue asa, statin, beta blocker  Plan: Lipid panel reflex to direct LDL Non-fasting,         Comprehensive metabolic panel (BMP + Alb, Alk         Phos, ALT, AST, Total. Bili, TP)          (I10) Essential hypertension with goal blood pressure less than 140/90  Comment: well controlled  Plan: lisinopril (ZESTRIL) 10 MG tablet          (E78.5) Hyperlipidemia with target LDL less than 70  Comment:   Plan: well controlled    (C61) Prostate cancer (H)  Comment:    hx prostate ca treated with brachytherapy; persistent voiding symptoms-continue tamsulosin  Plan: tamsulosin (FLOMAX) 0.4 MG capsule, PSA, tumor         marker                Subjective   Andrew is a 73 year old, presenting for the following health issues:  Diabetes and Hypertension        4/24/2024     2:22 PM   Additional Questions   Roomed by Barbara   Accompanied by self         4/24/2024     2:22 PM   Patient Reported Additional Medications   Patient reports taking the following new medications no     History of Present Illness       Diabetes:   He presents for follow up of diabetes.  He is checking home blood glucose a few times a month.   He checks blood glucose before meals.  Blood glucose is never over 200 and never under 70. He is aware of hypoglycemia symptoms including shakiness.    He has no concerns regarding his diabetes at this time.   He is not experiencing numbness or burning in feet, excessive thirst, blurry  vision, weight changes or redness, sores or blisters on feet.           He eats 2-3 servings of fruits and vegetables daily.He consumes 0 sweetened beverage(s) daily.He exercises with enough effort to increase his heart rate 10 to 19 minutes per day.  He exercises with enough effort to increase his heart rate 3 or less days per week.   He is taking medications regularly.     BP     120/60  4/24/2024    Lab Results   Component Value Date     11/09/2023     08/05/2021     08/24/2020     Lab Results   Component Value Date    A1C 7.2 04/24/2024    A1C 7.9 01/11/2021     Lab Results   Component Value Date    LDL 47 04/24/2023    LDL 43 08/24/2020     Lab Results   Component Value Date    MICROL 75.1 10/24/2023    MICROL 38 08/05/2021    MICROL 109 08/24/2020        Patient Active Problem List   Diagnosis    Prostate cancer (H):[place] 2010    Colon polyps    Chronic right shoulder pain- recurrent since 2013    Cervical spinal stenosis    Type 2 diabetes mellitus with hyperglycemia, without long-term current use of insulin (H)    Essential hypertension with goal blood pressure less than 140/90    Hyperlipidemia with target LDL less than 70    Coronary artery disease involving native coronary artery of native heart without angina pectoris    Diverticulosis of large intestine    Aortic stenosis, moderate    Class 2 severe obesity due to excess calories with serious comorbidity in adult (H)     Past Surgical History:   Procedure Laterality Date    Brachytherapy for prostate cancer  2011    Coronary angiogram with stent placement x2.      CV CORONARY ANGIOGRAM N/A 11/7/2023    Procedure: Coronary Angiogram RADIAL IF ABLE;  Surgeon: Odilon Houser MD;  Location: [facility] CARDIAC CATH LAB    CV INSTANTANEOUS WAVE-FREE RATIO N/A 11/7/2023    Procedure: Instantaneous Wave-Free Ratio;  Surgeon: Odilon Houser MD;  Location: [facility] CARDIAC CATH LAB    CYSTOSCOPY      ENT SURGERY          Current  "Outpatient Medications   Medication Sig Dispense Refill    aspirin 81 MG tablet Take 1 tablet (81 mg) by mouth daily 90 tablet 11    atorvastatin (LIPITOR) 40 MG tablet Take 1 tablet (40 mg) by mouth at bedtime 90 tablet 2    empagliflozin (JARDIANCE) 25 MG TABS tablet Take 1 tablet (25 mg) by mouth daily 90 tablet 3    glipiZIDE (GLUCOTROL XL) 5 MG 24 hr tablet Take 1 tablet (5 mg) by mouth daily 90 tablet 3    lisinopril (ZESTRIL) 10 MG tablet Take 1 tablet (10 mg) by mouth daily 90 tablet 11    metFORMIN (GLUCOPHAGE) 1000 MG tablet TAKE 1 TABLET BY MOUTH TWICE DAILY WITH MEALS 180 tablet 3    metoprolol succinate ER (TOPROL XL) 25 MG 24 hr tablet Take 0.5 tablets (12.5 mg) by mouth daily 45 tablet 11    multivitamin, therapeutic (THERA-VIT) TABS tablet Take 1 tablet by mouth daily      nitroGLYcerin (NITROSTAT) 0.4 MG sublingual tablet For chest pain place 1 tablet under the tongue every 5 minutes for 3 doses. If symptoms persist 5 minutes after 1st dose call 911. 25 tablet 3    omeprazole 20 MG tablet Take 20 mg by mouth daily.      pioglitazone (ACTOS) 15 MG tablet TAKE 1 TABLET EVERY DAY 90 tablet 3    tamsulosin (FLOMAX) 0.4 MG capsule Take 1 capsule (0.4 mg) by mouth daily 90 capsule 11    triamcinolone (KENALOG) 0.1 % external cream Apply topically 2 times daily as needed for irritation (Patient not taking: Reported on 4/24/2024) 80 g 1              Objective    /60 (BP Location: Right arm, Patient Position: Sitting, Cuff Size: Adult Large)   Pulse 88   Temp 97.6  F (36.4  C) (Tympanic)   Resp 16   Ht 1.753 m (5' 9\")   Wt 108.9 kg (240 lb 1.6 oz)   SpO2 96%   BMI 35.46 kg/m    Body mass index is 35.46 kg/m .  Physical Exam   GENERAL: alert and no distress  NECK: no adenopathy, no asymmetry, masses, or scars  RESP: lungs clear to auscultation - no rales, rhonchi or wheezes  CV: regular rate and rhythm, normal S1 S2, no S3 or S4, no murmur, click or rub, no peripheral edema  MS: no gross " musculoskeletal defects noted, no edema  SKIN: no suspicious lesions or rashes      Signed Electronically by: Jesus Cherry MD

## 2024-04-25 LAB
ALBUMIN SERPL BCG-MCNC: 4.5 G/DL (ref 3.5–5.2)
ALP SERPL-CCNC: 80 U/L (ref 40–150)
ALT SERPL W P-5'-P-CCNC: 23 U/L (ref 0–70)
ANION GAP SERPL CALCULATED.3IONS-SCNC: 13 MMOL/L (ref 7–15)
AST SERPL W P-5'-P-CCNC: 19 U/L (ref 0–45)
BILIRUB SERPL-MCNC: 0.3 MG/DL
BUN SERPL-MCNC: 17.2 MG/DL (ref 8–23)
CALCIUM SERPL-MCNC: 9.4 MG/DL (ref 8.8–10.2)
CHLORIDE SERPL-SCNC: 102 MMOL/L (ref 98–107)
CHOLEST SERPL-MCNC: 121 MG/DL
CREAT SERPL-MCNC: 0.97 MG/DL (ref 0.67–1.17)
DEPRECATED HCO3 PLAS-SCNC: 24 MMOL/L (ref 22–29)
EGFRCR SERPLBLD CKD-EPI 2021: 81 ML/MIN/1.73M2
FASTING STATUS PATIENT QL REPORTED: YES
GLUCOSE SERPL-MCNC: 220 MG/DL (ref 70–99)
HDLC SERPL-MCNC: 64 MG/DL
LDLC SERPL CALC-MCNC: 29 MG/DL
NONHDLC SERPL-MCNC: 57 MG/DL
POTASSIUM SERPL-SCNC: 4.4 MMOL/L (ref 3.4–5.3)
PROT SERPL-MCNC: 6.9 G/DL (ref 6.4–8.3)
PSA SERPL DL<=0.01 NG/ML-MCNC: <0.01 NG/ML (ref 0–6.5)
SODIUM SERPL-SCNC: 139 MMOL/L (ref 135–145)
TRIGL SERPL-MCNC: 142 MG/DL

## 2024-05-17 DIAGNOSIS — I25.10 CORONARY ARTERY DISEASE INVOLVING NATIVE CORONARY ARTERY OF NATIVE HEART WITHOUT ANGINA PECTORIS: ICD-10-CM

## 2024-05-17 RX ORDER — METOPROLOL SUCCINATE 25 MG/1
12.5 TABLET, EXTENDED RELEASE ORAL DAILY
Qty: 45 TABLET | Refills: 1 | Status: SHIPPED | OUTPATIENT
Start: 2024-05-17

## 2024-05-30 ENCOUNTER — TRANSFERRED RECORDS (OUTPATIENT)
Dept: HEALTH INFORMATION MANAGEMENT | Facility: CLINIC | Age: 74
End: 2024-05-30
Payer: COMMERCIAL

## 2024-06-03 ENCOUNTER — TRANSCRIBE ORDERS (OUTPATIENT)
Dept: OTHER | Age: 74
End: 2024-06-03

## 2024-06-03 DIAGNOSIS — Z86.0100 PERSONAL HISTORY OF COLONIC POLYPS: Primary | ICD-10-CM

## 2024-06-04 ENCOUNTER — PATIENT OUTREACH (OUTPATIENT)
Dept: ONCOLOGY | Facility: CLINIC | Age: 74
End: 2024-06-04
Payer: COMMERCIAL

## 2024-06-04 NOTE — PROGRESS NOTES
New Patient Oncology Nurse Navigator Note     Referring provider: Rory Rosas MD      Referring Clinic/Organization: [facility]     Referred to (specialty:) Genetic Counseling     Requested provider (if applicable): NA     Date Referral Received: June 4, 2024     Evaluation for:  Z86.010 (ICD-10-CM) - Personal history of colonic polyps        Payor: [employer] / Plan: [employer] / Product Type: O /       June 4, 2024  Referral received and reviewed. Sent to NPS to process.     Regina KRAMERN, RN   Oncology Nurse Navigator   [facility]   [phone] / [phone]

## 2024-06-12 ENCOUNTER — VIRTUAL VISIT (OUTPATIENT)
Dept: ONCOLOGY | Facility: CLINIC | Age: 74
End: 2024-06-12
Attending: INTERNAL MEDICINE
Payer: COMMERCIAL

## 2024-06-12 DIAGNOSIS — C61 PROSTATE CANCER (H): ICD-10-CM

## 2024-06-12 DIAGNOSIS — Z86.0100 PERSONAL HISTORY OF COLONIC POLYPS: Primary | ICD-10-CM

## 2024-06-12 PROCEDURE — 96040 HC GENETIC COUNSELING, EACH 30 MINUTES: CPT | Mod: GT,95 | Performed by: GENETIC COUNSELOR, MS

## 2024-06-12 NOTE — Clinical Note
"6/12/2024      Héctor Quispe  [address] [place] [address]      Dear Colleague,    Thank you for referring your patient, Héctor Quispe, to the [facility]. Please see a copy of my visit note below.    Virtual Visit Details    Type of service:  Video Visit   Video Start Time: {video visit start/end time for provider to select:574410}  Video End Time:{video visit start/end time for provider to select:465229}    Originating Location (pt. Location): {video visit patient location:280464::\"Home\"}  {PROVIDER LOCATION On-site should be selected for visits conducted from your clinic location or adjoining [facility] hospital, academic office, or other nearby [facility] building. Off-site should be selected for all other provider locations, including home:193753}  Distant Location (provider location):  {virtual location provider:052335}  Platform used for Video Visit: {Virtual Visit Platforms:383170::\"Reviews42\"}    6/12/2024    Referring Provider: ***    Presenting Information:   I met with Héctor for his video genetic counseling visit, through the Cancer Risk Management Program, to discuss his personal *** and family history of *** cancer. Today we reviewed this history, cancer screening recommendations, and available genetic testing options.    Personal History:  Héctor is a 73 year old year old male. He was diagnosed with ***; treatment included ***  / does not have any personal history of cancer.    ***She had her first menstrual period at age ***, her first child at age ***, and is ***.  ***Héctor has her ovaries, fallopian tubes and uterus in place, and she has had *** ovarian cancer screening to date. She reports that she *** hormone replacement therapy.      She has *** clinical breast exams and mammograms; her most recent mammogram in *** was ***. ***Héctor began having colonoscopies at the age of ***/Héctor has not had a colonoscopy. His most recent colonoscopy in *** was *** and follow-up " was recommended in ***. ***He does not regularly do any other cancer screening at this time. Héctor reported *** tobacco use and *** alcohol use.    Family History: (Please see scanned pedigree for detailed family history information)  ***  ***  His maternal ethnicity is ***. His paternal ethnicity is ***. There is no known Ashkenazi Hindu ancestry on either side of his family. There is no reported consanguinity.    Discussion:  Héctor's personal and family history of *** is suggestive of a hereditary cancer syndrome.  We reviewed the features of sporadic, familial, and hereditary cancers. ***  We discussed the natural history and genetics of hereditary cancer. A detailed handout regarding hereditary cancer, along with the other information we discussed, will be mailed to Héctor at the end of our appointment today and can be found in the after visit summary. Topics included: inheritance pattern, cancer risks, cancer screening recommendations, and also risks, benefits and limitations of testing.  Based on his personal and family history, Héctor meets current National Comprehensive Cancer Network (NCCN) criteria for genetic testing of ***.  We discussed that there are additional genes that could cause increased risk for *** cancer. As many of these genes present with overlapping features in a family and accurate cancer risk cannot always be established based upon the pedigree analysis alone, it would be reasonable for Héctor to consider panel genetic testing to analyze multiple genes at once.  ***  Héctor stated that he would prefer to submit a saliva kit for his genetic testing. ***[facility] will send a kit directly to his home with directions on how to collect a saliva sample. We discussed that there is a small chance for sample failure due to contamination of the sample. To help minimize this, he should follow the directions that are sent with the kit. Héctor verbalized understanding of this. Once the sample is  collected, he will send it to ***[facility] using the return envelope and prepaid shipping label.   Verbal consent was given over video*** and written on the consent form. Turnaround time is approximately 4 weeks once the lab receives the sample.  ***  The possible outcomes of positive, negative, and uncertain were discussed with Héctor.  Medical Management: For Héctor, we reviewed that the information from genetic testing may determine:  ***surgery to treat Héctor's active cancer diagnosis (i.e. lumpectomy versus bilateral mastectomy, partial versus total colectomy, etc.***),  additional cancer screening for which Héctor may qualify (i.e. mammogram and breast MRI, more frequent colonoscopies, more frequent dermatologic exams, etc.***),  options for risk reducing surgeries Héctor could consider (i.e. bilateral mastectomy, surgery to remove her ovaries and/or uterus, etc.***),    and targeted chemotherapies for Héctor's *** active cancer, or ***if he were to develop certain cancers in the future (i.e. immunotherapy for individuals with Thomson syndrome, PARP inhibitors, etc.***).   These recommendations and possible targeted chemotherapies will be discussed in detail once genetic testing is completed.     Plan:  1) Today Héctor elected to proceed with *** testing with automatic reflex to ***.  2) A copy of the consent form and the after visit summary will be sent to Héctor.  3) This information should be available in approximately 4 weeks, once the lab receives the sample.  4) I will call Héctor with the results once they become available.    Time spent on video: *** minutes    Chris Ferreira MS, [facility]  Licensed, Certified Genetic Counselor    ***    Virtual Visit Details    Type of service:  Video Visit     Originating Location (pt. Location): Home  Distant Location (provider location):  Off-site  Platform used for Video Visit: AmWell      Again, thank you for allowing me to participate in the care of your patient.         Sincerely,        Chris Ferreira, GC

## 2024-06-12 NOTE — NURSING NOTE
Is the patient currently in the state of [place]? YES    Visit mode:VIDEO    If the visit is dropped, the patient can be reconnected by: VIDEO VISIT: Text to cell phone:   Telephone Information:   Mobile [phone]       Will anyone else be joining the visit? NO  (If patient encounters technical issues they should call [phone] :[phone])    How would you like to obtain your AVS? MyChart    Are changes needed to the allergy or medication list? N/A    Are refills needed on medications prescribed by this physician? NO    Reason for visit: Consult    YAMILETH COUCH

## 2024-06-12 NOTE — PATIENT INSTRUCTIONS
Assessing Cancer Risk  Only about 5-10% of cancers are thought to be due to an inherited cancer susceptibility gene.    These families often have:  Several people with the same or related types of cancer  Cancers diagnosed at a young age (before age 50)  Individuals with more than one primary cancer  Multiple generations of the family affected with cancer    Comprehensive Colon Cancer Panel  We each inherit two copies of every gene in our bodies: one from our mother, and one from our father. Each gene has a specific job to do. When a gene has a mistake or  mutation  in it, it does not work like it should.      This handout will review common hereditary colon cancer syndromes, and other genes related to an increased risk for colon cancer.  The genes that will be discussed in this handout are: APC, BMPR1A, CDH1, CHEK2, EPCAM, GREM1, MLH1, MSH2, MSH6, MUTYH, PMS2, POLD1, POLE, PTEN, SMAD4, STK11, and TP53.  These genes are clinically actionable, meaning there are published guidelines for cancer screening and management for individuals who are found to carry mutations in these genes. Inheriting a mutation does not mean a person will develop cancer, but it does significantly increase his or her risk above the general population risk.      Familial Adenomatous Polyposis (FAP)  FAP is a hereditary cancer syndrome caused by mutations in the APC gene. The condition is known to cause hundreds to thousands of adenomatous polyps in the colon, creating a  carpet  of polyps. Some individuals have what is called attenuated FAP (AFAP), a milder form of FAP with fewer polyps and typically later onset. Individuals with an APC gene mutation are at an increased risk for colon, thyroid, and duodenal cancers, as well as several other types of cancer1.  Other features of this condition may include: osteomas, dental anomalies, benign skin lesions, CHRPE ( freckle  on the inside of the eye), and desmoid tumors.      Lifetime Cancer Risks     Cancer Type General Population FAP   Colon  4.1% near 100%   Thyroid (papillary) 1.2% Up to 12%   Duodenal <1% Up to 10%   Liver (hepatoblastoma)  <1% Up to 2.5% before age 5   Pancreas 1.7% Up to 2%   Stomach <1% Up to 7.1%       Juvenile Polyposis Syndrome (JPS)  JPS is characterized by hamartomatous polyps, called juvenile polyps, in the gastrointestinal tract.  Juvenile  refers to the type of polyps seen in this hereditary cancer condition, not the age of onset. Currently, mutations in two genes are known to cause JPS: BMPR1A and SMAD4. Of individuals clinically diagnosed with JPS, only 40% have an identifiable mutation in one of these genes, suggesting there are other genes that cause JPS that have not been discovered yet. Individuals with JPS are at an increased risk for colon cancer and stomach cancer 2,3,4. Pancreatic and small bowel cancers have also been reported in JPS, but the actual risks are unknown.         Lifetime Cancer Risks   Cancer Type General Population JPS   Colon 4.1% 40-50%   Gastric/Duodenal <1% Up to 21%     Some individuals with SMAD4 mutations have a condition called JPS/HHT (Juvenile Polyposis/Hereditary Hemorrhagic Telangiectasia) where in addition to JPS, individuals may have nose bleeds and clotting issues.     Hereditary Diffuse Gastric Cancer (HDGC)  Currently, mutations in one gene are known to cause Hereditary Diffuse Gastric Cancer: CDH1.  Individuals with HDGC are at increased risk for diffuse gastric cancer and lobular breast cancer. Of people diagnosed with HDGC, 30-50% have a mutation in the CDH1 gene.  This suggests there are likely mutations in other genes that may cause HDGC that have not been identified yet. Individuals with HDGC may also be at increased risk for colon cancer.      Lifetime Cancer Risks   Cancer Type General Population HDGC   Diffuse Gastric <1% 67-83%   Breast 12% 41-60%     Thomson syndrome  Mutations in five different genes are known to cause Thomson  syndrome: MLH1, MSH2, MSH6, PMS2, and EPCAM. Individuals with Thomson syndrome have an increased risk for colon, uterine, ovarian, small bowel, stomach, urinary tract, and brain cancer, as well as several other types of cancer. The exact lifetime cancer risks are dependent upon the gene in which the mutation was identified.      Lifetime Cancer Risks   Cancer Type General Population Thomson syndrome   Colon 5% Up to 61%   Uterine 2-3% Up to 57%   Stomach <1% Up to 9%   Ovarian 2% Up to 38%   Urinary tract <1% Up to 28%   Hepatobiliary tract <1% Up to 3.7%   Small bowel <1% Up to 11%   Brain/CNS <1% Up to 7.7%   Pancreas <1% Up to 6.2%       MUTYH-Associated Polyposis (MAP)  MAP is a hereditary cancer syndrome caused by mutations in the MUTYH gene. Unlike the other hereditary cancer genes discussed in this handout, two mutations in the MUTYH gene cause MAP and increase cancer risk. Those affected with MAP typically have between  adenomatous polyps. This syndrome also increases the risk for colon and duodenal cancer. Current research suggests that other cancers may be associated with MUTYH mutations, as well. The table below includes the risk that someone with two MUTYH gene mutations would develop cancer in their lifetime; of note, there is also an increased colon cancer risk for individuals who carry only one MUTYH gene mutation5,6,7.       Lifetime Cancer Risks   Cancer Type General Population MAP   Colon 5% 70-90%   Duodenal  <1% 4%       Cowden syndrome  Cowden syndrome is a hereditary condition that increases the risk for breast, thyroid, endometrial, colon, and kidney cancer.  A single mutation in the PTEN gene causes Cowden syndrome and increases cancer risk.  The table below shows the chance that someone with a PTEN mutation would develop cancer in their lifetime8,9.  Other benign features seen in some individuals with Cowden syndrome include benign skin lesions (facial papules, keratoses, lipomas),  learning disabilities, autism, thyroid nodules, hamartomatous colon polyps, and larger head size.      Lifetime Cancer Risks   Cancer Type General Population Cowden Syndrome   Breast 12% 40-60%*   Thyroid 1% 35%   Renal 1-2% 34%   Endometrial 2-3% 28%   Colon 4.1% 9%   Melanoma 2-3% Up to 6%     *One recent study found breast cancer risk to be increased to 85%    Peutz-Jeghers syndrome (PJS)  PJS is a hereditary cancer syndrome caused by mutations in the STK11 gene. This condition can be distinguished from other hereditary syndromes by the presence of hamartomatous polyps in the gastrointestinal tract and freckles present in unusual places such as the hands, feet, neck, and lips. Individuals with Peutz-Jeghers syndrome have an increased risk for colon, breast, pancreatic, and other cancers3.  Men are at risk for testicular tumors which can affect hormones in the body. Women are at risk for sex cord tumors of the ovaries and a rare aggressive type of cervical cancer.     Lifetime Cancer Risks   Cancer Type General Population PJS   Breast 12% 32-54%   Colon 4.1% 39%   Stomach <1% 29%   Pancreas 1.5% 11-36%   Small Intestine <1% 13%   Ovarian  <2%  >20%   Lung 6-7% 7-17%     Additional Genes Associated with Increased Colon Cancer Risk  CHEK2  CHEK2 is a moderate-risk breast cancer gene.  Women who have a mutation in CHEK2 have around a 2-fold increased risk for breast cancer compared to the general population, and this risk may be higher depending upon family history.12,13,14. Mutations in CHEK2 have also been shown to increase the risk of a number of other cancers, including colon and prostate, however these cancer risks are currently not well understood.     GREM1  GREM1 is a moderate-risk colon polyposis gene. Duplications of this gene are more commonly found in individuals with Ashkenazi Restoration [id number]. Mutations in GREM1 are associated with colon polyps and therefore an increased risk of colon cancer; however  the estimated cancer risk is not well [id number].     POLD1 and POLE  POLD1 and POLE are moderate-risk colon cancer genes. Carriers of a mutation in one of these genes increases the lifetime risk of colorectal faeyak31,18,19,20. Mutations in these genes may also be associated with increased risk for other cancers including: endometrial cancer, duodenal adenomas and carcinomas, and brain tumors.    TP53  Li Fraumeni syndrome (LFS) is a hereditary cancer predisposition syndrome. LFS is caused by a mutation in the TP53 gene. A single mutation in one of the copies of TP53 increases the risk for multiple cancers. Individuals with LFS are at an increased risk for developing cancer at a young age. The general lifetime risk for development of cancer is 50% by age 30 and 90% by age 60.      Core Cancers: Sarcomas, Breast, Brain, Lung, Leukemias/Lymphomas, Adrenocortical carcinomas  Other Cancers: Gastrointestinal, Thyroid, Skin, Genitourinary    Genetic Testing  Genetic testing involves a simple blood test and will look at the genetic information in genes associated with an increased risk of colon cancer. The tests look for any harmful mutations that are associated with increased cancer risk.  If possible, it is recommended that the person(s) who has had cancer be tested before other family members.  That person will give us the most useful information about whether or not a specific gene mutation is associated with the cancer in the family.     Results  There are three possible results from genetic testing:  Positive--a harmful mutation was identified  Negative--no mutation was identified  Variant of unknown significance--a variation in one of the genes was identified, but it is unclear how this impacts cancer risk in the family  Advantages and Disadvantages  There are advantages and disadvantages to genetic testing of these genes.    Advantages  May clarify your cancer risk  Can help you make medical decisions  May  explain the cancers in your family  May give useful information to your family members (if you share your results)    Disadvantages  Possible negative emotional impact of learning about inherited cancer risk  Uncertainty in interpreting a negative test result in some situations  Possible genetic discrimination concerns (see below)    Inheritance   Most mutations in the genes outlined above are inherited in an autosomal dominant pattern.  This means that if a parent has a mutation, each of their children will have a 50% chance of inheriting that same mutation.  Therefore, each child--male or female--would have a 50% chance of being at increased risk for developing cancer.                                              Image obtained from [employer] Reference, 2013     In the case of MUTYH-Associated Polyposis (MAP) this hereditary cancer syndrome is inherited in an autosomal recessive pattern. This means that each parent of an individual with MAP is a carrier of MAP, meaning that they have only one mutation in MUTYH. They still have one functioning copy of their gene.  Carriers are at a slightly higher risk for colon cancer than the general population. If each parent is a carrier for MAP, they have a 25% of having a child who is affected with MAP, meaning the child inherited both gene mutations - one from each parent.       Image obtained from [employer] Reference, 2016    Genetic Information Nondiscrimination Act (KENA)  The Genetic Information Nondiscrimination Act of 2008 (KENA) is a federal law that protects individuals from health insurance or employment discrimination based on a genetic test result alone (with some exceptions, including employers with fewer than 15 employees, and ).  Although rare, KENA  does not cover discrimination protections in terms of life insurance, long term care, or disability insurances.  Visit the [employer] [place] website to learn more. Visit the  National Human Genome Research Boles at Genome.gov/16486636 to learn more.    Reducing Cancer Risk  Each of the genes listed within this handout have nationally recognized cancer screening guidelines that would be recommended for individuals who test positive.  In addition to increased cancer screening, surgeries may be offered or recommended to reduce cancer risk in certain cases.  Recommendations are based upon an individual s genetic test result as well as their personal and family history of cancer.    Questions to Think About Regarding Genetic Testing  What effect will the test result have on me and my relationship with my family members if I have an inherited gene mutation?  If I don t have a gene mutation?  Should I share my test results, and how will my family react to this news, which may also affect them?  Are my children ready to learn new information that may one day affect their own health?    Resources    PTEN World [email or web address].[email or web address]   No Stomach for Cancer, Inc. nostomachforcancer.org   Stomach Cancer Relief Network scrnet.org   Collaborative Group of the Americas on Inherited Colorectal Cancer (CGA) cgaicc.com   Cancer Care cancercare.org   American Cancer Society (ACS) cancer.org   National Cancer Boles (NCI) cancer.org   Thomson Syndrome International [email or web address]       Please call us if you have any questions or concerns.     Cancer Risk Management Program [phone] ([phone])  Chris Ferreira, MS [facility]  [phone]  Kaci Mayen, MS, [facility] [phone]  Jennifer Navarrete, MS, [facility]  [phone]  Eli Miranda, MS, [facility]  [phone]  Ella Valencia, MS, [facility]  [phone]  Katina Van, MS, [facility] [phone]  Rima Montalvo, MS, [facility] [phone]    References    Misty MARMOLEJO, Charlie J, Brady G, Rufus E, Elizabeth J, et al. The Prevalence of thyroid cancer and benign thyroid disease in patients with familial adenomatous polyposis may be higher than previously recognized. Clin Colorectal Cancer.  2012;11:304-308.  Latricia L, Liam Gar A, Miguel L, Baylee MOORE, Naomy K, et al. Risk of colorectal cancer in juvenile polyposis. Gut. 2007;56:965-967.  Erick FG, Nellie [place], Alec CA. Colorectal cancer risk in hamartomatous polyposis syndromes. World Journal of Gastrointestinal Surgery. 2015;27:25-32  Matthew POST. Guidance on gastrointestinal surveillance for hereditary non-polyposis colorectal cancer, familial adenomatous polypolis, juvenile polyposis, and Peutz-Jeghers syndrome. Gut. 2002;51:21-27.  Vargas AK, Kip RED, Morteza JG et al. Risk of extracolonic cancers for people with biallelic and monoallelic mutations in MUTYH. Int J of Cancer. 2016;139:3205-1094.  Mary S, Jose S, Shoaib H, Hal K, Bernabe M, et al. MUTYH-associated polyposis: 70 of 71 patients with biallelic mutations present with an attenuated or atypical phenotype. Int J of Cancer. 2006;119:807-814.  Jay G, Georgie F, Bravo I, Pinchev M, Gildford H, et al. MUTYH mutation carriers have increased breast cancer risk. Cancer. 2012;5871-8872.  James MH, Martínez J, Jayna J, Marky LA, Orthu MS, Eng C. Lifetime cancer risks in individuals with germline PTEN mutations. Clin Cancer Res. 2012;18:400-7.  Benita FAUSTIN. Cowden Syndrome: A Critical Review of the Clinical Literature. J Gemma . 2009:18:13-27.  National Comprehensive Cancer Network. Clinical practice guidelines in oncology, colorectal cancer screening. Available online (registration required). 2013.  National Cancer [place]. SEER Cancer Stat Fact Sheets.  December 2013.  CHEK2 Breast Cancer Case-Control Consortium. CHEK2*1100delC and susceptibility to breast cancer: A collaborative analysis involving 10,860 breast cancer cases and 9,065 controls from 10 studies. Am J Hum Gemma, 74 (2004), pp. 8164-8598  Jenna T, Montez S, Abundio K, et al. Spectrum of Mutations in BRCA1, BRCA2, CHEK2, and TP53 in Families at High Risk of Breast Cancer. TIGIST.  2006;295(12):5027-1106.  Luiz MOORE, Monroe D, To ZHAO, et al. Risk of breast cancer in women with a CHEK2 mutation with and without a family history of breast cancer. J Clin Oncol. 2011;29:3386-2455.  Angle PEREZ, Fina E, Ben J, Titus N, Reid M et al. Defining the polyposis/colorectal cancer phenotype associated with the GREM1 duplication: counseling and management guidelines. Gemma .Res. 2016;98:1-5.  Austin E, Lonny S, Giovani ZHAO, Makayla James, et al. Hereditary mixed polyposis syndrome is caused by a 40kb upstream duplication that leads to increased and ectopic expression of the BMP antagonist GREM1. Lina Gemma. 2015;44:699-703.  TERESA Blanco. et al. Germline mutations affecting the proofreading domains of POLE and POLD1 predispose to colorectal adenomas and carcinomas. Lina. Gemma. 45, 136-44 (2013).  ROBERT James. et al. POLE and POLD1 mutations in 529 moreno with familial colorectal cancer and/or polyposis: review of reported cases and recommendations for genetic testing and surveillance. Gemma. Med. (2015). doi:10.1038/gim.2015.75  JOHNNY Beck. et al. New insights into POLE and POLD1 germline mutations in familial colorectal cancer and polyposis. Hum. Mol. Gemma. 23, 8565-12 (2014).  KARIME Pathak. et al. Frequency and phenotypic spectrum of germline mutations in POLE and seven other polymerase genes in 266 patients with colorectal adenomas and carcinomas. Int. J. Cancer 137, 320-31 (2015).

## 2024-06-12 NOTE — LETTER
2024    Héctor [place]  [address] [place] [address]      Dear Héctor,    It was a pleasure speaking with you over video on 2024. Here is a copy of the progress note from our discussion. If you have any additional questions, please feel free to call.    Referring Provider: Rory Rosas MD    Presenting Information:   I met with Héctor for his video genetic counseling visit, through the Cancer Risk Management Program, to discuss his personal history of prostate cancer and colon polyps. Today we reviewed this history, cancer screening recommendations, and available genetic testing options.    Personal History:  Héctor is a 73 year old year old male. He was diagnosed with prostate cancer at age 59; treatment included brachytherapy. Héctor began having colonoscopies at the age of 48. His most recent colonoscopy in May 2024 found eleven 2-8mm tubular adenomas and follow-up was recommended in 1 year. This now bring his total number of adenoma polyps over 20 in his lifetime. He does not regularly do any other cancer screening at this time. Héctor reported previous tobacco use.    Family History: (Please see scanned pedigree for detailed family history information)  Héctor's father  from lung cancer at age 77. He is reported to have a history of smoking.  There is no other reported family history of cancer or polyps on either side of the family, however, his mother was an only child, biologically.  His maternal ethnicity is [place] and [place]. His paternal ethnicity is English and Ukrainian. There is no known Ashkenazi Hinduism ancestry on either side of his family. There is no reported consanguinity.    Discussion:  Héctor's personal history of prostate cancer and colon polyps is suggestive of a hereditary cancer syndrome.  We reviewed the features of sporadic, familial, and hereditary cancers. Familial Adenomatous Polyposis (FAP) is a condition caused by mutations in the APC gene. Individuals  with FAP typically have many (more than 100) adenoma type polyps in the colon and significantly increased risk for colon and other cancers. These precancerous polyps can develop in the teens and without preventative surgery, colon cancer is almost inevitable. Attenuated FAP (AFAP) is also a condition caused by mutations in the APC gene. It is milder than classic FAP, however, as affected usually have  polyps and the lifetime risk of colon cancer is lower at approximately 70%. Other cancers associated with FAP and AFAP include thyroid, pancreatic, gastric, and duodenal cancers.    We discussed the natural history and genetics of hereditary cancer. A detailed handout regarding hereditary cancer, along with the other information we discussed, will be mailed to Héctor at the end of our appointment today and can be found in the after visit summary. Topics included: inheritance pattern, cancer risks, cancer screening recommendations, and also risks, benefits and limitations of testing.  Based on his personal and family history, Héctor meets current National Comprehensive Cancer Network (NCCN) criteria for genetic testing of APC/ MUTYH along with other colon cancer and/or polyposis susceptibility genes.  We discussed that there are additional genes that could cause increased risk for colon polyps and/or prostate cancer. As many of these genes present with overlapping features in a family and accurate cancer risk cannot always be established based upon the pedigree analysis alone, it would be reasonable for Héctor to consider panel genetic testing to analyze multiple genes at once.  Héctor wanted to think about his genetic testing options. He will contact me should he be interested in genetic testing in the future. My contact information was provided.  The possible outcomes of positive, negative, and uncertain were discussed with Héctor.  Medical Management: For Héctor, we reviewed that the information from genetic  testing may determine:  additional cancer screening for which Héctor may qualify (i.e. more frequent colonoscopies, more frequent dermatologic exams, etc.),  and targeted chemotherapies if he were to develop certain cancers in the future (i.e. immunotherapy for individuals with Thomson syndrome, PARP inhibitors, etc.).   These recommendations and possible targeted chemotherapies will be discussed in detail once genetic testing is completed.     Plan:  1) Today Héctor elected to think about his genetic testing options.  2) A copy of the after visit summary will be sent to Héctor.  3) Héctor will contact me should he become interested in genetic testing in the future.    Time spent on video: 23 minutes    Chris Ferreira MS, [facility] [place]ensed, Certified Genetic Counselor

## 2024-06-12 NOTE — PROGRESS NOTES
2024    Referring Provider: Rory Rosas MD    Presenting Information:   I met with Héctor for his video genetic counseling visit, through the Cancer Risk Management Program, to discuss his personal history of prostate cancer and colon polyps. Today we reviewed this history, cancer screening recommendations, and available genetic testing options.    Personal History:  Héctor is a 73 year old year old male. He was diagnosed with prostate cancer at age 59; treatment included brachytherapy. Héctor began having colonoscopies at the age of 48. His most recent colonoscopy in May 2024 found eleven 2-8mm tubular adenomas and follow-up was recommended in 1 year. This now bring his total number of adenoma polyps over 20 in his lifetime. He does not regularly do any other cancer screening at this time. Héctor reported previous tobacco use.    Family History: (Please see scanned pedigree for detailed family history information)  Héctor's father  from lung cancer at age 77. He is reported to have a history of smoking.  There is no other reported family history of cancer or polyps on either side of the family, however, his mother was an only child, biologically.  His maternal ethnicity is Azeri and [place]. His paternal ethnicity is Uzbek and Chinese. There is no known Ashkenazi Caodaism ancestry on either side of his family. There is no reported consanguinity.    Discussion:  Héctor's personal history of prostate cancer and colon polyps is suggestive of a hereditary cancer syndrome.  We reviewed the features of sporadic, familial, and hereditary cancers. Familial Adenomatous Polyposis (FAP) is a condition caused by mutations in the APC gene. Individuals with FAP typically have many (more than 100) adenoma type polyps in the colon and significantly increased risk for colon and other cancers. These precancerous polyps can develop in the teens and without preventative surgery, colon cancer is almost inevitable.  Attenuated FAP (AFAP) is also a condition caused by mutations in the APC gene. It is milder than classic FAP, however, as affected usually have  polyps and the lifetime risk of colon cancer is lower at approximately 70%. Other cancers associated with FAP and AFAP include thyroid, pancreatic, gastric, and duodenal cancers.    We discussed the natural history and genetics of hereditary cancer. A detailed handout regarding hereditary cancer, along with the other information we discussed, will be mailed to Héctor at the end of our appointment today and can be found in the after visit summary. Topics included: inheritance pattern, cancer risks, cancer screening recommendations, and also risks, benefits and limitations of testing.  Based on his personal and family history, Héctor meets current National Comprehensive Cancer Network (NCCN) criteria for genetic testing of APC/ MUTYH along with other colon cancer and/or polyposis susceptibility genes.  We discussed that there are additional genes that could cause increased risk for colon polyps and/or prostate cancer. As many of these genes present with overlapping features in a family and accurate cancer risk cannot always be established based upon the pedigree analysis alone, it would be reasonable for Héctor to consider panel genetic testing to analyze multiple genes at once.  Héctor wanted to think about his genetic testing options. He will contact me should he be interested in genetic testing in the future. My contact information was provided.  The possible outcomes of positive, negative, and uncertain were discussed with Héctor.  Medical Management: For Héctor, we reviewed that the information from genetic testing may determine:  additional cancer screening for which Héctor may qualify (i.e. more frequent colonoscopies, more frequent dermatologic exams, etc.),  and targeted chemotherapies if he were to develop certain cancers in the future (i.e. immunotherapy for  individuals with Thomson syndrome, PARP inhibitors, etc.).   These recommendations and possible targeted chemotherapies will be discussed in detail once genetic testing is completed.     Plan:  1) Today Héctor elected to think about his genetic testing options.  2) A copy of the after visit summary will be sent to Héctor.  3) Héctor will contact me should he become interested in genetic testing in the future.    Time spent on video: 23 minutes    Chris Ferreira MS, [facility] [place]  Licensed, Certified Genetic Counselor      Virtual Visit Details    Type of service:  Video Visit     Originating Location (pt. Location): Home  Distant Location (provider location):  Off-site  Platform used for Video Visit: Harvey

## 2024-06-25 ENCOUNTER — TRANSFERRED RECORDS (OUTPATIENT)
Dept: HEALTH INFORMATION MANAGEMENT | Facility: CLINIC | Age: 74
End: 2024-06-25
Payer: COMMERCIAL

## 2024-08-06 NOTE — TELEPHONE ENCOUNTER
Patient states that he has active COVID right now and he can get into a program at the [facility] to receive antibodies but you have to call [phone] to help get him in there.    Thank you.   No

## 2024-10-25 ENCOUNTER — HOSPITAL ENCOUNTER (OUTPATIENT)
Dept: CARDIOLOGY | Facility: CLINIC | Age: 74
Discharge: HOME | End: 2024-10-25
Attending: INTERNAL MEDICINE | Admitting: INTERNAL MEDICINE
Payer: COMMERCIAL

## 2024-10-25 DIAGNOSIS — I35.0 AORTIC STENOSIS, MODERATE: ICD-10-CM

## 2024-10-25 LAB — LVEF ECHO: NORMAL

## 2024-10-25 PROCEDURE — 255N000002 HC RX 255 OP 636: Performed by: INTERNAL MEDICINE

## 2024-10-25 PROCEDURE — 999N000208 ECHOCARDIOGRAM COMPLETE

## 2024-10-25 PROCEDURE — 93306 TTE W/DOPPLER COMPLETE: CPT | Mod: 26 | Performed by: INTERNAL MEDICINE

## 2024-10-25 RX ADMIN — HUMAN ALBUMIN MICROSPHERES AND PERFLUTREN 3 ML: 10; .22 INJECTION, SOLUTION INTRAVENOUS at 09:54

## 2024-10-29 ENCOUNTER — TELEPHONE (OUTPATIENT)
Dept: CARDIOLOGY | Facility: CLINIC | Age: 74
End: 2024-10-29

## 2024-10-29 ENCOUNTER — OFFICE VISIT (OUTPATIENT)
Dept: PEDIATRICS | Facility: CLINIC | Age: 74
End: 2024-10-29
Payer: COMMERCIAL

## 2024-10-29 VITALS
TEMPERATURE: 97 F | DIASTOLIC BLOOD PRESSURE: 60 MMHG | HEIGHT: 69 IN | HEART RATE: 77 BPM | RESPIRATION RATE: 16 BRPM | WEIGHT: 236 LBS | SYSTOLIC BLOOD PRESSURE: 120 MMHG | OXYGEN SATURATION: 93 % | BODY MASS INDEX: 34.96 KG/M2

## 2024-10-29 DIAGNOSIS — E78.5 HYPERLIPIDEMIA WITH TARGET LDL LESS THAN 70: ICD-10-CM

## 2024-10-29 DIAGNOSIS — L98.9 SKIN LESION: ICD-10-CM

## 2024-10-29 DIAGNOSIS — I10 ESSENTIAL HYPERTENSION WITH GOAL BLOOD PRESSURE LESS THAN 140/90: ICD-10-CM

## 2024-10-29 DIAGNOSIS — I25.10 CORONARY ARTERY DISEASE INVOLVING NATIVE CORONARY ARTERY OF NATIVE HEART WITHOUT ANGINA PECTORIS: ICD-10-CM

## 2024-10-29 DIAGNOSIS — E11.65 TYPE 2 DIABETES MELLITUS WITH HYPERGLYCEMIA, WITHOUT LONG-TERM CURRENT USE OF INSULIN (H): Primary | ICD-10-CM

## 2024-10-29 DIAGNOSIS — E66.811 CLASS 1 OBESITY DUE TO EXCESS CALORIES WITH SERIOUS COMORBIDITY AND BODY MASS INDEX (BMI) OF 34.0 TO 34.9 IN ADULT: ICD-10-CM

## 2024-10-29 DIAGNOSIS — I35.0 AORTIC STENOSIS, MODERATE: Primary | ICD-10-CM

## 2024-10-29 DIAGNOSIS — E66.09 CLASS 1 OBESITY DUE TO EXCESS CALORIES WITH SERIOUS COMORBIDITY AND BODY MASS INDEX (BMI) OF 34.0 TO 34.9 IN ADULT: ICD-10-CM

## 2024-10-29 LAB
EST. AVERAGE GLUCOSE BLD GHB EST-MCNC: 166 MG/DL
HBA1C MFR BLD: 7.4 % (ref 0–5.6)

## 2024-10-29 PROCEDURE — 80048 BASIC METABOLIC PNL TOTAL CA: CPT | Performed by: INTERNAL MEDICINE

## 2024-10-29 PROCEDURE — 99214 OFFICE O/P EST MOD 30 MIN: CPT | Performed by: INTERNAL MEDICINE

## 2024-10-29 PROCEDURE — 36415 COLL VENOUS BLD VENIPUNCTURE: CPT | Performed by: INTERNAL MEDICINE

## 2024-10-29 PROCEDURE — 83036 HEMOGLOBIN GLYCOSYLATED A1C: CPT | Performed by: INTERNAL MEDICINE

## 2024-10-29 ASSESSMENT — PAIN SCALES - GENERAL: PAINLEVEL_OUTOF10: NO PAIN (0)

## 2024-10-29 NOTE — PROGRESS NOTES
"  Assessment & Plan     (E11.65) Type 2 diabetes mellitus with hyperglycemia, without long-term current use of insulin (H)  (primary encounter diagnosis)  Comment: Jardiance 25, glipizide 5, metformin 2000, pioglitazone 15.  Plan: HEMOGLOBIN A1C, Basic metabolic panel  (Ca, Cl,        CO2, Creat, Gluc, K, Na, BUN)             Adequate control, due for lab work.  Discussed GLP-1 agent/patient has declined secondary to cost.  (If coverage changes, discussed regimen: empagliflozin, semaglutide or other GLP-1, and metformin --(likely stop pioglitazone and glipizide))    (I10) Essential hypertension with goal blood pressure less than 140/90  Comment:   Plan: Well-controlled, continue current medications.    (E78.5) Hyperlipidemia with target LDL less than 70  Comment:   Plan: Well-controlled, continue atorvastatin    (L98.9) Skin lesion  Comment: Requests referral to dermatology for annual skin check  Plan: Adult Dermatology  Referral          (E66.811,  E66.09,  Z68.34) Class 1 obesity due to excess calories with serious comorbidity and body mass index (BMI) of 34.0 to 34.9 in adult  Comment:   Plan: Obesity with comorbid type 2 diabetes, hypertension, hyperlipidemia          BMI  Estimated body mass index is 34.85 kg/m  as calculated from the following:    Height as of this encounter: 1.753 m (5' 9\").    Weight as of this encounter: 107 kg (236 lb).             Randi Morales is a 74 year old, presenting for the following health issues:  Diabetes        10/29/2024     9:23 AM   Additional Questions   Roomed by Radha HERNANDEZ CMA   Accompanied by Self         10/29/2024     9:23 AM   Patient Reported Additional Medications   Patient reports taking the following new medications n/a     History of Present Illness       Diabetes:   He presents for follow up of diabetes.  He is checking home blood glucose a few times a month.   He checks blood glucose before meals.  Blood glucose is never over 200 and never under " 70. He is aware of hypoglycemia symptoms including shakiness.    He has no concerns regarding his diabetes at this time.   He is not experiencing numbness or burning in feet, excessive thirst, blurry vision, weight changes or redness, sores or blisters on feet.           He eats 2-3 servings of fruits and vegetables daily.He consumes 0 sweetened beverage(s) daily.He exercises with enough effort to increase his heart rate 10 to 19 minutes per day.  He exercises with enough effort to increase his heart rate 3 or less days per week.   He is taking medications regularly.     BP     120/60  10/29/2024    Lab Results   Component Value Date     04/24/2024     08/05/2021     08/24/2020     Lab Results   Component Value Date    A1C 7.2 04/24/2024    A1C 7.9 01/11/2021     Lab Results   Component Value Date    LDL 29 04/24/2024    LDL 43 08/24/2020     Lab Results   Component Value Date    MICROL 75.1 10/24/2023    MICROL 38 08/05/2021    MICROL 109 08/24/2020     Current Outpatient Medications   Medication Sig Dispense Refill    aspirin 81 MG tablet Take 1 tablet (81 mg) by mouth daily 90 tablet 11    atorvastatin (LIPITOR) 40 MG tablet Take 1 tablet (40 mg) by mouth at bedtime 90 tablet 2    empagliflozin (JARDIANCE) 25 MG TABS tablet Take 1 tablet (25 mg) by mouth daily 90 tablet 3    glipiZIDE (GLUCOTROL XL) 5 MG 24 hr tablet Take 1 tablet (5 mg) by mouth daily 90 tablet 3    lisinopril (ZESTRIL) 10 MG tablet Take 1 tablet (10 mg) by mouth daily 90 tablet 11    metFORMIN (GLUCOPHAGE) 1000 MG tablet TAKE 1 TABLET BY MOUTH TWICE DAILY WITH MEALS 180 tablet 3    metoprolol succinate ER (TOPROL XL) 25 MG 24 hr tablet Take 0.5 tablets (12.5 mg) by mouth daily 45 tablet 1    multivitamin, therapeutic (THERA-VIT) TABS tablet Take 1 tablet by mouth daily      nitroGLYcerin (NITROSTAT) 0.4 MG sublingual tablet For chest pain place 1 tablet under the tongue every 5 minutes for 3 doses. If symptoms persist 5  "minutes after 1st dose call 911. 25 tablet 3    omeprazole 20 MG tablet Take 20 mg by mouth daily.      pioglitazone (ACTOS) 15 MG tablet TAKE 1 TABLET EVERY DAY 90 tablet 3    tamsulosin (FLOMAX) 0.4 MG capsule Take 1 capsule (0.4 mg) by mouth daily 90 capsule 11    triamcinolone (KENALOG) 0.1 % external cream Apply topically 2 times daily as needed for irritation 80 g 1                    Objective    /60   Pulse 77   Temp 97  F (36.1  C) (Temporal)   Resp 16   Ht 1.753 m (5' 9\")   Wt 107 kg (236 lb)   SpO2 93%   BMI 34.85 kg/m    Body mass index is 34.85 kg/m .  Physical Exam   GENERAL: alert and no distress  NECK: no adenopathy, no asymmetry, masses, or scars  RESP: lungs clear to auscultation - no rales, rhonchi or wheezes  CV: regular rate and rhythm, normal S1 S2, no S3 or S4  MS: no gross musculoskeletal defects noted, no edema  PSYCH: mentation appears normal, affect normal/bright            Signed Electronically by: Jesus Cherry MD    "

## 2024-10-29 NOTE — PATIENT INSTRUCTIONS
[facility]:  [facility]  [address]  [address]  [place] [address]    Office:  [phone]  Fax:      [phone]

## 2024-10-29 NOTE — TELEPHONE ENCOUNTER
[facility] Call Center    Phone Message    May a detailed message be left on voicemail: yes     Reason for Call: Other: Patient called to reschedule 10/30/24 appointment and next available is in February. Patient requesting [facility] location and no APPT listed in protocol. Per patient, he had an echo and would like a call back to discuss results.       Action Taken: Other: Cardio    Travel Screening: Not Applicable     Date of Service:

## 2024-10-29 NOTE — TELEPHONE ENCOUNTER
Attempted to contact patient to discuss echo, left a message that Dr. Cabrera has not seen this result yet. Left the Team 2 RN phone if patient has specific questions we can try to review.    Left message for patient to call back to Team 2 R.N.s @ [phone]     Order updated for annual visit at the [facility] in February.        Echo 10/25/2024 (unclear why this is not in your results)  1. The left ventricle is normal in structure, function and size. The visual  ejection fraction is estimated at 60%.  2. The right ventricle is normal in structure, function and size.  3. Moderate valvular aortic stenosis. Mean 18mmHg, ARMANDO 1.3cm2, DI 0.31.  Echo 10/2023 showed EF 60%, AS with mean 22mmHg, ARMANDO 1.2cm2, DI 0.33.    Will message Dr. Cabrera to review      1325 spoke with patient, he arranged to move his OV to 12/4/2024 with Dr. Cabrera and has no further questions at this time.

## 2024-10-29 NOTE — TELEPHONE ENCOUNTER
1st attempt- Left voicemail for the patient to call back and schedule the following:    Appointment type:  Return Cardiology  Provider:  Dr Cabrera or RICKY  Return date:  1 st available  Additional appointment(s) needed:  [facility] or [facility]  Additonal Notes:  reschedule due to orphaned  Specialty phone number: [phone]

## 2024-10-30 LAB
ANION GAP SERPL CALCULATED.3IONS-SCNC: 12 MMOL/L (ref 7–15)
BUN SERPL-MCNC: 16.1 MG/DL (ref 8–23)
CALCIUM SERPL-MCNC: 9.4 MG/DL (ref 8.8–10.4)
CHLORIDE SERPL-SCNC: 100 MMOL/L (ref 98–107)
CREAT SERPL-MCNC: 0.92 MG/DL (ref 0.67–1.17)
EGFRCR SERPLBLD CKD-EPI 2021: 86 ML/MIN/1.73M2
GLUCOSE SERPL-MCNC: 139 MG/DL (ref 70–99)
HCO3 SERPL-SCNC: 27 MMOL/L (ref 22–29)
POTASSIUM SERPL-SCNC: 4.7 MMOL/L (ref 3.4–5.3)
SODIUM SERPL-SCNC: 139 MMOL/L (ref 135–145)

## 2024-11-07 DIAGNOSIS — E78.5 HYPERLIPIDEMIA WITH TARGET LDL LESS THAN 70: ICD-10-CM

## 2024-11-07 RX ORDER — ATORVASTATIN CALCIUM 40 MG/1
40 TABLET, FILM COATED ORAL AT BEDTIME
Qty: 90 TABLET | Refills: 0 | Status: SHIPPED | OUTPATIENT
Start: 2024-11-07

## 2024-11-10 DIAGNOSIS — I25.10 CORONARY ARTERY DISEASE INVOLVING NATIVE CORONARY ARTERY OF NATIVE HEART WITHOUT ANGINA PECTORIS: ICD-10-CM

## 2024-11-11 RX ORDER — METOPROLOL SUCCINATE 25 MG/1
12.5 TABLET, EXTENDED RELEASE ORAL DAILY
Qty: 45 TABLET | Refills: 1 | Status: SHIPPED | OUTPATIENT
Start: 2024-11-11

## 2024-12-05 ENCOUNTER — TRANSFERRED RECORDS (OUTPATIENT)
Dept: HEALTH INFORMATION MANAGEMENT | Facility: CLINIC | Age: 74
End: 2024-12-05
Payer: COMMERCIAL

## 2024-12-23 ENCOUNTER — OFFICE VISIT (OUTPATIENT)
Dept: CARDIOLOGY | Facility: CLINIC | Age: 74
End: 2024-12-23
Payer: COMMERCIAL

## 2024-12-23 VITALS
BODY MASS INDEX: 35.19 KG/M2 | DIASTOLIC BLOOD PRESSURE: 60 MMHG | OXYGEN SATURATION: 96 % | HEART RATE: 74 BPM | WEIGHT: 237.6 LBS | HEIGHT: 69 IN | SYSTOLIC BLOOD PRESSURE: 124 MMHG

## 2024-12-23 DIAGNOSIS — E78.5 HYPERLIPIDEMIA WITH TARGET LDL LESS THAN 70: ICD-10-CM

## 2024-12-23 DIAGNOSIS — I25.10 CORONARY ARTERY DISEASE INVOLVING NATIVE CORONARY ARTERY OF NATIVE HEART WITHOUT ANGINA PECTORIS: ICD-10-CM

## 2024-12-23 RX ORDER — ATORVASTATIN CALCIUM 40 MG/1
40 TABLET, FILM COATED ORAL AT BEDTIME
Qty: 90 TABLET | Refills: 3 | Status: SHIPPED | OUTPATIENT
Start: 2024-12-23

## 2024-12-23 NOTE — PATIENT INSTRUCTIONS
December 23, 2024    Thank you for allowing our Cardiology team to participate in your care.     Please note the following changes to your heart treatment plan:     Medication changes:   - none    Tests to be done:  - annual labs through PCP    Follow up:  - Follow up in 1 year, or sooner as needed.      For scheduling, please call [phone].      Please contact our team through TeamPages or our Nurse Team Voicemail service [phone], or the General Clinic [phone] for any questions or concerns.     If you are having a medical emergency, please call [phone].     Sincerely,    Zac Cabrera MD, FACC  Cardiology    [facility]

## 2024-12-23 NOTE — LETTER
12/23/2024    Jesus Cherry MD  [address] [place] [address]    RE: Héctor [place]       Dear Colleague,     I had the pleasure of seeing Héctor [place] in the [facility].      Cardiology Clinic Progress Note:    December 23, 2024   Patient Name: Héctor [place]  Patient MRN: [id number]     Consult indication: CAD    HPI:    I had the opportunity to see patient Héctor [place] in cardiology clinic for a follow up visit. Patient is followed by our colleague Jesus Cherry MD with Primary Care.     As you know, patient is a pleasant 74-year-old male with a past medical history significant for coronary artery disease with NSTEMI status post PCI (BMS to RCA 1996, BMS x2 to RCA near prior stent 2002), hypertension, hyperlipidemia, diabetes, obesity, aortic stenosis, who presents for follow up.     Patient has a remote history of coronary artery disease.  In 1996, he presented with unstable angina resulting in a small NSTEMI.  He underwent cardiac catheterization with bare-metal stent placement to the RCA.  In 2002, he had another NSTEMI, for which he had 2 overlapping 4 mm bare-metal stents in the RCA near the prior stent.  Patient was assessed with a coronary angiogram 11/7/2023 that demonstrated LAD 40 to 50% proximal stenosis, IFR 1.0, left circumflex small vessel,  after OM1, distal vessel fills via collaterals, RCA dominant, distal 50% stenosis, IFR 0.95.    Overall patient reports that he feels well.  He denies any chest pain, chest pressure, normal shortness of breath.  Functional capacity is limited by neck and back pain.  He denies symptoms of orthopnea/PND, abnormal lower extremity swelling.  BP today in clinic 124/60 mmHg.    Reviewed TTE from 10/25/2024 demonstrating LVEF 60%, normal RV function, moderate aortic stenosis with mean gradient 18 mmHg, ARMANDO 1.3 cm , DI 0.31.  Stable from TTE 10/2023.    Assessment and Plan/Recommendations:    # CAD with NSTEMI status post  PCI (BMS to RCA 1996, BMS x2 to RCA near prior stent 2002), CMR stress test 12/2021 demonstrates small sized area of mild mid inferolateral ischemia that had been medically managed.   Coronary angiogram 11/7/2023 demonstrated LAD 40 to 50% proximal stenosis, IFR 1.0, left circumflex small vessel,  after OM1, distal vessel fills via collaterals, RCA dominant, distal 50% stenosis, IFR 0.95. No angina. Stable.  # Moderate aortic stenosis. Stable  # HTN, well controlled  # HL, LDL at goal <70  # DM2  # Obesity    -Overall patient is stable from a cardiac perspective without symptoms concerning for angina or decompensated heart failure  - Discussed the importance of healthy lifestyle habits including regular aerobic exercise, light resistance training, heart healthy diet  - Continue current cardiac regimen of aspirin, statin, lisinopril, metoprolol succinate, sublingual nitroglycerin as needed  - Follow-up in 1 year or sooner as needed, anticipate TTE every 2 to 3 years for surveillance of the aortic stenosis    Thank you for allowing our team to participate in the care of Héctor [place].  Please do not hesitate to call or page me with any questions or concerns.    Sincerely,     Zac Cabrera MD, [facility]  Cardiology  Text Page   December 23, 2024    Voice recognition software utilized.     Total time spent on this encounter today: Greater than 30 minutes, providing care in this encounter including, but not limited to, reviewing prior medical records, laboratory data, imaging studies, diagnostic studies, procedure notes, formulating an assessment and plan, recommendations, discussion and counseling with patient face to face, dictation.    Past Medical History:     Past Medical History:   Diagnosis Date     Aortic stenosis      BPH (benign prostatic hyperplasia)      Coronary artery disease 01/06/2016     Diverticulosis of large intestine      Essential hypertension 01/06/2016     Gastroesophageal reflux  disease      h/o 2019 novel coronavirus disease (COVID-19) 12/18/2020     Prostate cancer      Type 2 diabetes mellitus with diabetic nephropathy 01/06/2016        Past Surgical History:   Past Surgical History:   Procedure Laterality Date     Brachytherapy for prostate cancer  2011     Coronary angiogram with stent placement x2.       CV CORONARY ANGIOGRAM N/A 11/7/2023    Procedure: Coronary Angiogram RADIAL IF ABLE;  Surgeon: Odilon Houser MD;  Location:  HEART CARDIAC CATH LAB     CV INSTANTANEOUS WAVE-FREE RATIO N/A 11/7/2023    Procedure: Instantaneous Wave-Free Ratio;  Surgeon: Odilon Houser MD;  Location:  HEART CARDIAC CATH LAB     CYSTOSCOPY       ENT SURGERY         Medications (outpatient):  Current Outpatient Medications   Medication Sig Dispense Refill     aspirin 81 MG tablet Take 1 tablet (81 mg) by mouth daily 90 tablet 11     atorvastatin (LIPITOR) 40 MG tablet Take 1 tablet (40 mg) by mouth at bedtime. 90 tablet 3     empagliflozin (JARDIANCE) 25 MG TABS tablet Take 1 tablet (25 mg) by mouth daily 90 tablet 3     glipiZIDE (GLUCOTROL XL) 5 MG 24 hr tablet Take 1 tablet (5 mg) by mouth daily 90 tablet 3     lisinopril (ZESTRIL) 10 MG tablet Take 1 tablet (10 mg) by mouth daily 90 tablet 11     metFORMIN (GLUCOPHAGE) 1000 MG tablet TAKE 1 TABLET BY MOUTH TWICE DAILY WITH MEALS 180 tablet 3     metoprolol succinate ER (TOPROL XL) 25 MG 24 hr tablet Take 1/2 (one-half) tablet by mouth once daily 45 tablet 1     multivitamin, therapeutic (THERA-VIT) TABS tablet Take 1 tablet by mouth daily       nitroGLYcerin (NITROSTAT) 0.4 MG sublingual tablet For chest pain place 1 tablet under the tongue every 5 minutes for 3 doses. If symptoms persist 5 minutes after 1st dose call 911. 25 tablet 3     omeprazole 20 MG tablet Take 20 mg by mouth daily.       pioglitazone (ACTOS) 15 MG tablet TAKE 1 TABLET EVERY DAY 90 tablet 3     tamsulosin (FLOMAX) 0.4 MG capsule Take 1 capsule (0.4 mg) by  "mouth daily 90 capsule 11     triamcinolone (KENALOG) 0.1 % external cream Apply topically 2 times daily as needed for irritation 80 g 1       Allergies:  No Known Allergies    Social History:   History   Drug Use No      History   Smoking Status     Former     Types: Cigarettes   Smokeless Tobacco     Never     Social History    Substance and Sexual Activity      Alcohol use: Yes        Alcohol/week: 3.0 standard drinks of alcohol        Types: 3 Cans of beer per week        Comment: Couple beers a day       Family History:  Family History   Problem Relation Age of Onset     Alzheimer Disease Mother      Cancer Father      Diabetes No family hx of      Coronary Artery Disease No family hx of      Hypertension No family hx of      Hyperlipidemia No family hx of      Cerebrovascular Disease No family hx of      Breast Cancer No family hx of      Colon Cancer No family hx of      Prostate Cancer No family hx of      Other Cancer No family hx of      Depression No family hx of      Anxiety Disorder No family hx of      Mental Illness No family hx of      Substance Abuse No family hx of      Anesthesia Reaction No family hx of      Asthma No family hx of      Osteoporosis No family hx of      Genetic Disorder No family hx of      Thyroid Disease No family hx of      Obesity No family hx of      Unknown/Adopted No family hx of        Review of Systems:   A complete review of systems was negative except as mentioned in the History of Present Illness.     Objective & Physical Exam:  /60 (BP Location: Right arm, Patient Position: Sitting, Cuff Size: Adult Large)   Pulse 74   Ht 1.753 m (5' 9\")   Wt 107.8 kg (237 lb 9.6 oz)   SpO2 96%   BMI 35.09 kg/m    Wt Readings from Last 2 Encounters:   12/23/24 107.8 kg (237 lb 9.6 oz)   10/29/24 107 kg (236 lb)     Body mass index is 35.09 kg/m .   Body surface area is 2.29 meters squared.    Constitutional: appears stated age, in no apparent distress, appears to be well " nourished  Pulmonary: clear to auscultation bilaterally, no wheezes, no rales, no increased work of breathing  Cardiovascular: JVP normal, regular rate, regular rhythm, 2/6 DEBBIE at the RUSB, no lower extremity edema  Gastrointestinal: no guarding, non-rigid     Data reviewed:  Lab Results   Component Value Date    WBC 5.7 11/07/2023    WBC 4.0 05/06/2015    RBC 4.15 (L) 11/07/2023    RBC 4.04 (L) 05/06/2015    HGB 13.2 (L) 11/07/2023    HGB 12.9 (L) 05/06/2015    HCT 40.5 11/07/2023    HCT 39.3 (L) 05/06/2015    MCV 98 11/07/2023    MCV 97 05/06/2015    MCH 31.8 11/07/2023    MCH 31.9 05/06/2015    MCHC 32.6 11/07/2023    MCHC 32.8 05/06/2015    RDW 14.3 11/07/2023    RDW 13.2 05/06/2015     11/07/2023     05/06/2015     Sodium   Date Value Ref Range Status   10/29/2024 139 135 - 145 mmol/L Final   08/24/2020 138 133 - 144 mmol/L Final     Potassium   Date Value Ref Range Status   10/29/2024 4.7 3.4 - 5.3 mmol/L Final   08/05/2021 4.6 3.4 - 5.3 mmol/L Final   08/24/2020 4.1 3.4 - 5.3 mmol/L Final     Chloride   Date Value Ref Range Status   10/29/2024 100 98 - 107 mmol/L Final   08/05/2021 103 94 - 109 mmol/L Final   08/24/2020 105 94 - 109 mmol/L Final     Carbon Dioxide   Date Value Ref Range Status   08/24/2020 26 20 - 32 mmol/L Final     Carbon Dioxide (CO2)   Date Value Ref Range Status   10/29/2024 27 22 - 29 mmol/L Final   08/05/2021 25 20 - 32 mmol/L Final     Anion Gap   Date Value Ref Range Status   10/29/2024 12 7 - 15 mmol/L Final   08/05/2021 7 3 - 14 mmol/L Final   08/24/2020 7 3 - 14 mmol/L Final     Glucose   Date Value Ref Range Status   10/29/2024 139 (H) 70 - 99 mg/dL Final   08/05/2021 140 (H) 70 - 99 mg/dL Final   08/24/2020 146 (H) 70 - 99 mg/dL Final     Comment:     Fasting specimen     Urea Nitrogen   Date Value Ref Range Status   10/29/2024 16.1 8.0 - 23.0 mg/dL Final   08/05/2021 23 7 - 30 mg/dL Final   08/24/2020 24 7 - 30 mg/dL Final     Creatinine   Date Value Ref Range  Status   10/29/2024 0.92 0.67 - 1.17 mg/dL Final   08/24/2020 0.95 0.66 - 1.25 mg/dL Final     GFR Estimate   Date Value Ref Range Status   10/29/2024 86 >60 mL/min/1.73m2 Final     Comment:     eGFR calculated using 2021 CKD-EPI equation.   08/24/2020 79 >60 mL/min/[1.73_m2] Final     Comment:     Non  GFR Calc  Starting 12/18/2018, serum creatinine based estimated GFR (eGFR) will be   calculated using the Chronic Kidney Disease Epidemiology Collaboration   (CKD-EPI) equation.       Calcium   Date Value Ref Range Status   10/29/2024 9.4 8.8 - 10.4 mg/dL Final     Comment:     Reference intervals for this test were updated on 7/16/2024 to reflect our healthy population more accurately. There may be differences in the flagging of prior results with similar values performed with this method. Those prior results can be interpreted in the context of the updated reference intervals.   08/24/2020 8.5 8.5 - 10.1 mg/dL Final     Bilirubin Total   Date Value Ref Range Status   04/24/2024 0.3 <=1.2 mg/dL Final   08/24/2020 0.4 0.2 - 1.3 mg/dL Final     Alkaline Phosphatase   Date Value Ref Range Status   04/24/2024 80 40 - 150 U/L Final     Comment:     Reference intervals for this test were updated on 11/14/2023 to more accurately reflect our healthy population. There may be differences in the flagging of prior results with similar values performed with this method. Interpretation of those prior results can be made in the context of the updated reference intervals.   08/24/2020 69 40 - 150 U/L Final     ALT   Date Value Ref Range Status   04/24/2024 23 0 - 70 U/L Final     Comment:     Reference intervals for this test were updated on 6/12/2023 to more accurately reflect our healthy population. There may be differences in the flagging of prior results with similar values performed with this method. Interpretation of those prior results can be made in the context of the updated reference intervals.      2020 30 0 - 70 U/L Final     AST   Date Value Ref Range Status   2024 19 0 - 45 U/L Final     Comment:     Reference intervals for this test were updated on 2023 to more accurately reflect our healthy population. There may be differences in the flagging of prior results with similar values performed with this method. Interpretation of those prior results can be made in the context of the updated reference intervals.   2020 15 0 - 45 U/L Final     Recent Labs   Lab Test 24  1451 23  1106   CHOL 121 131   HDL 64 62   LDL 29 47   TRIG 142 112      Lab Results   Component Value Date    A1C 7.4 10/29/2024    A1C 7.2 2024    A1C 7.4 10/24/2023    A1C 6.7 2023    A1C 7.3 11/15/2022    A1C 7.9 2021    A1C 7.3 2020    A1C 8.4 10/31/2019    A1C 7.5 2019    A1C 7.2 2018        Recent Results (from the past 4320 hours)   Echocardiogram Complete   Result Value    LVEF  60%    Narrative    [id number]  [id number]  [id number]  [id number]^CHUCHO^LIDIA^ITFFANI     [facility]  Echocardiography Laboratory  [address] [place] [address]     Name: LUDMILA CODY  MRN: [id number]  : 1950  Study Date: 10/25/2024 09:25 AM  Age: 74 yrs  Gender: Male  Patient Location: [facility]  Reason For Study: Aortic stenosis, moderate  Ordering Physician: LIDIA RANKIN  Referring Physician: LIDIA RANKIN  Performed By: Malcolm Curtis RDCS     BSA: 2.2 m2  Height: 69 in  Weight: 240 lb  HR: 73  BP: 122/77 mmHg  ______________________________________________________________________________  Procedure  Complete Echo Adult.  ______________________________________________________________________________  Interpretation Summary     1. The left ventricle is normal in structure, function and size. The visual  ejection fraction is estimated at 60%.  2. The right ventricle is normal in structure, function and size.  3. Moderate valvular aortic stenosis. Mean 18mmHg, ARMANDO 1.3cm2, DI  0.31.     Echo 10/2023 showed EF 60%, AS with mean 22mmHg, ARMANDO 1.2cm2, DI 0.33.  ______________________________________________________________________________  Left Ventricle  The left ventricle is normal in structure, function and size. There is normal  left ventricular wall thickness. The visual ejection fraction is estimated at  60%. Left ventricular diastolic function is normal. Normal left ventricular  wall motion.     Right Ventricle  The right ventricle is normal in structure, function and size.     Atria  Normal left atrial size. Right atrial size is normal. There is no atrial shunt  seen.     Mitral Valve  The mitral valve is normal in structure and function.     Tricuspid Valve  There is trace tricuspid regurgitation.     Aortic Valve  Moderate valvular aortic stenosis.     Pulmonic Valve  The pulmonic valve is normal in structure and function.     Vessels  Normal ascending, transverse (arch), and descending aorta. The inferior vena  cava was normal in size with preserved respiratory variability.     Pericardium  There is no pericardial effusion.     Rhythm  Sinus rhythm was noted.  ______________________________________________________________________________  MMode/2D Measurements & Calculations  IVSd: 0.96 cm     LVIDd: 4.5 cm  LVIDs: 2.8 cm  LVPWd: 0.97 cm  FS: 38.4 %  LV mass(C)d: 146.6 grams  LV mass(C)dI: 65.7 grams/m2  Ao root diam: 3.6 cm  asc Aorta Diam: 3.1 cm  LVOT diam: 2.2 cm  LVOT area: 3.7 cm2  Ao root diam index Ht(cm/m): 2.1  Ao root diam index BSA (cm/m2): 1.6  Asc Ao diam index BSA (cm/m2): 1.4  Asc Ao diam index Ht(cm/m): 1.8  LA Volume (BP): 44.2 ml     LA Volume Index (BP): 19.8 ml/m2  RWT: 0.43  TAPSE: 2.7 cm     Time Measurements  Aortic HR: 70.0 BPM     Doppler Measurements & Calculations  MV E max bebeto: 83.8 cm/sec  MV A max bebeto: 108.0 cm/sec  MV E/A: 0.78  MV max P.1 mmHg  MV mean P.3 mmHg  MV V2 VTI: 29.9 cm  MVA(VTI): 2.8 cm2  MV dec time: 0.22 sec  Ao V2 max: 284.0  cm/sec  Ao max P.3 mmHg  Ao V2 mean: 194.0 cm/sec  Ao mean P.0 mmHg  Ao V2 VTI: 58.5 cm  ARMANDO(I,D): 1.4 cm2  ARMANDO(V,D): 1.2 cm2  LV V1 max PG: 3.2 mmHg  LV V1 max: 89.1 cm/sec  LV V1 VTI: 22.5 cm  CO(LVOT): 5.9 l/min  CI(LVOT): 2.6 l/min/m2  SV(LVOT): 83.9 ml  SI(LVOT): 37.6 ml/m2  PA acc time: 0.07 sec     AV Jorge Luis Ratio (DI): 0.31  ARMANDO Index (cm2/m2): 0.64  E/E' av.2  Lateral E/e': 10.4  Medial E/e': 12.0  RV S Jorge Luis: 12.9 cm/sec     ______________________________________________________________________________  Report approved by: Marisol Segovia 10/25/2024 11:51 AM              Thank you for allowing me to participate in the care of your patient.      Sincerely,     Zac Cabrera MD     [facility]  cc:   Zac Cabrera MD  [address] [facility] [address]  [place] [address]

## 2024-12-23 NOTE — PROGRESS NOTES
Cardiology Clinic Progress Note:    December 23, 2024   Patient Name: Héctor Quispe  Patient MRN: [id number]     Consult indication: CAD    HPI:    I had the opportunity to see patient Héctor Quispe in cardiology clinic for a follow up visit. Patient is followed by our colleague Jesus Cherry MD with Primary Care.     As you know, patient is a pleasant 74-year-old male with a past medical history significant for coronary artery disease with NSTEMI status post PCI (BMS to RCA 1996, BMS x2 to RCA near prior stent 2002), hypertension, hyperlipidemia, diabetes, obesity, aortic stenosis, who presents for follow up.     Patient has a remote history of coronary artery disease.  In 1996, he presented with unstable angina resulting in a small NSTEMI.  He underwent cardiac catheterization with bare-metal stent placement to the RCA.  In 2002, he had another NSTEMI, for which he had 2 overlapping 4 mm bare-metal stents in the RCA near the prior stent.  Patient was assessed with a coronary angiogram 11/7/2023 that demonstrated LAD 40 to 50% proximal stenosis, IFR 1.0, left circumflex small vessel,  after OM1, distal vessel fills via collaterals, RCA dominant, distal 50% stenosis, IFR 0.95.    Overall patient reports that he feels well.  He denies any chest pain, chest pressure, normal shortness of breath.  Functional capacity is limited by neck and back pain.  He denies symptoms of orthopnea/PND, abnormal lower extremity swelling.  BP today in clinic 124/60 mmHg.    Reviewed TTE from 10/25/2024 demonstrating LVEF 60%, normal RV function, moderate aortic stenosis with mean gradient 18 mmHg, ARMANDO 1.3 cm , DI 0.31.  Stable from TTE 10/2023.    Assessment and Plan/Recommendations:    # CAD with NSTEMI status post PCI (BMS to RCA 1996, BMS x2 to RCA near prior stent 2002), CMR stress test 12/2021 demonstrates small sized area of mild mid inferolateral ischemia that had been medically managed.   Coronary angiogram 11/7/2023  demonstrated LAD 40 to 50% proximal stenosis, IFR 1.0, left circumflex small vessel,  after OM1, distal vessel fills via collaterals, RCA dominant, distal 50% stenosis, IFR 0.95. No angina. Stable.  # Moderate aortic stenosis. Stable  # HTN, well controlled  # HL, LDL at goal <70  # DM2  # Obesity    -Overall patient is stable from a cardiac perspective without symptoms concerning for angina or decompensated heart failure  - Discussed the importance of healthy lifestyle habits including regular aerobic exercise, light resistance training, heart healthy diet  - Continue current cardiac regimen of aspirin, statin, lisinopril, metoprolol succinate, sublingual nitroglycerin as needed  - Follow-up in 1 year or sooner as needed, anticipate TTE every 2 to 3 years for surveillance of the aortic stenosis    Thank you for allowing our team to participate in the care of Héctor Quispe.  Please do not hesitate to call or page me with any questions or concerns.    Sincerely,     Zac Cabrera MD, [facility]  Cardiology  Text Page   December 23, 2024    Voice recognition software utilized.     Total time spent on this encounter today: Greater than 30 minutes, providing care in this encounter including, but not limited to, reviewing prior medical records, laboratory data, imaging studies, diagnostic studies, procedure notes, formulating an assessment and plan, recommendations, discussion and counseling with patient face to face, dictation.    Past Medical History:     Past Medical History:   Diagnosis Date    Aortic stenosis     BPH (benign prostatic hyperplasia)     Coronary artery disease 01/06/2016    Diverticulosis of large intestine     Essential hypertension 01/06/2016    Gastroesophageal reflux disease     h/o 2019 novel coronavirus disease (COVID-19) 12/18/2020    Prostate cancer     Type 2 diabetes mellitus with diabetic nephropathy 01/06/2016        Past Surgical History:   Past Surgical History:    Procedure Laterality Date    Brachytherapy for prostate cancer  2011    Coronary angiogram with stent placement x2.      CV CORONARY ANGIOGRAM N/A 11/7/2023    Procedure: Coronary Angiogram RADIAL IF ABLE;  Surgeon: Odilon Houser MD;  Location:  [facility]    CV INSTANTANEOUS WAVE-FREE RATIO N/A 11/7/2023    Procedure: Instantaneous Wave-Free Ratio;  Surgeon: Odilon Houser MD;  Location:  [facility]    CYSTOSCOPY      ENT SURGERY         Medications (outpatient):  Current Outpatient Medications   Medication Sig Dispense Refill    aspirin 81 MG tablet Take 1 tablet (81 mg) by mouth daily 90 tablet 11    atorvastatin (LIPITOR) 40 MG tablet Take 1 tablet (40 mg) by mouth at bedtime. 90 tablet 3    empagliflozin (JARDIANCE) 25 MG TABS tablet Take 1 tablet (25 mg) by mouth daily 90 tablet 3    glipiZIDE (GLUCOTROL XL) 5 MG 24 hr tablet Take 1 tablet (5 mg) by mouth daily 90 tablet 3    lisinopril (ZESTRIL) 10 MG tablet Take 1 tablet (10 mg) by mouth daily 90 tablet 11    metFORMIN (GLUCOPHAGE) 1000 MG tablet TAKE 1 TABLET BY MOUTH TWICE DAILY WITH MEALS 180 tablet 3    metoprolol succinate ER (TOPROL XL) 25 MG 24 hr tablet Take 1/2 (one-half) tablet by mouth once daily 45 tablet 1    multivitamin, therapeutic (THERA-VIT) TABS tablet Take 1 tablet by mouth daily      nitroGLYcerin (NITROSTAT) 0.4 MG sublingual tablet For chest pain place 1 tablet under the tongue every 5 minutes for 3 doses. If symptoms persist 5 minutes after 1st dose call 911. 25 tablet 3    omeprazole 20 MG tablet Take 20 mg by mouth daily.      pioglitazone (ACTOS) 15 MG tablet TAKE 1 TABLET EVERY DAY 90 tablet 3    tamsulosin (FLOMAX) 0.4 MG capsule Take 1 capsule (0.4 mg) by mouth daily 90 capsule 11    triamcinolone (KENALOG) 0.1 % external cream Apply topically 2 times daily as needed for irritation 80 g 1       Allergies:  No Known Allergies    Social History:   History   Drug Use No      History  "  Smoking Status    Former    Types: Cigarettes   Smokeless Tobacco    Never     Social History    Substance and Sexual Activity      Alcohol use: Yes        Alcohol/week: 3.0 standard drinks of alcohol        Types: 3 Cans of beer per week        Comment: Couple beers a day       Family History:  Family History   Problem Relation Age of Onset    Alzheimer Disease Mother     Cancer Father     Diabetes No family hx of     Coronary Artery Disease No family hx of     Hypertension No family hx of     Hyperlipidemia No family hx of     Cerebrovascular Disease No family hx of     Breast Cancer No family hx of     Colon Cancer No family hx of     Prostate Cancer No family hx of     Other Cancer No family hx of     Depression No family hx of     Anxiety Disorder No family hx of     Mental Illness No family hx of     Substance Abuse No family hx of     Anesthesia Reaction No family hx of     Asthma No family hx of     Osteoporosis No family hx of     Genetic Disorder No family hx of     Thyroid Disease No family hx of     Obesity No family hx of     Unknown/Adopted No family hx of        Review of Systems:   A complete review of systems was negative except as mentioned in the History of Present Illness.     Objective & Physical Exam:  /60 (BP Location: Right arm, Patient Position: Sitting, Cuff Size: Adult Large)   Pulse 74   Ht 1.753 m (5' 9\")   Wt 107.8 kg (237 lb 9.6 oz)   SpO2 96%   BMI 35.09 kg/m    Wt Readings from Last 2 Encounters:   12/23/24 107.8 kg (237 lb 9.6 oz)   10/29/24 107 kg (236 lb)     Body mass index is 35.09 kg/m .   Body surface area is 2.29 meters squared.    Constitutional: appears stated age, in no apparent distress, appears to be well nourished  Pulmonary: clear to auscultation bilaterally, no wheezes, no rales, no increased work of breathing  Cardiovascular: JVP normal, regular rate, regular rhythm, 2/6 DEBBIE at the RUSB, no lower extremity edema  Gastrointestinal: no guarding, non-rigid "     Data reviewed:  Lab Results   Component Value Date    WBC 5.7 11/07/2023    WBC 4.0 05/06/2015    RBC 4.15 (L) 11/07/2023    RBC 4.04 (L) 05/06/2015    HGB 13.2 (L) 11/07/2023    HGB 12.9 (L) 05/06/2015    HCT 40.5 11/07/2023    HCT 39.3 (L) 05/06/2015    MCV 98 11/07/2023    MCV 97 05/06/2015    MCH 31.8 11/07/2023    MCH 31.9 05/06/2015    MCHC 32.6 11/07/2023    MCHC 32.8 05/06/2015    RDW 14.3 11/07/2023    RDW 13.2 05/06/2015     11/07/2023     05/06/2015     Sodium   Date Value Ref Range Status   10/29/2024 139 135 - 145 mmol/L Final   08/24/2020 138 133 - 144 mmol/L Final     Potassium   Date Value Ref Range Status   10/29/2024 4.7 3.4 - 5.3 mmol/L Final   08/05/2021 4.6 3.4 - 5.3 mmol/L Final   08/24/2020 4.1 3.4 - 5.3 mmol/L Final     Chloride   Date Value Ref Range Status   10/29/2024 100 98 - 107 mmol/L Final   08/05/2021 103 94 - 109 mmol/L Final   08/24/2020 105 94 - 109 mmol/L Final     Carbon Dioxide   Date Value Ref Range Status   08/24/2020 26 20 - 32 mmol/L Final     Carbon Dioxide (CO2)   Date Value Ref Range Status   10/29/2024 27 22 - 29 mmol/L Final   08/05/2021 25 20 - 32 mmol/L Final     Anion Gap   Date Value Ref Range Status   10/29/2024 12 7 - 15 mmol/L Final   08/05/2021 7 3 - 14 mmol/L Final   08/24/2020 7 3 - 14 mmol/L Final     Glucose   Date Value Ref Range Status   10/29/2024 139 (H) 70 - 99 mg/dL Final   08/05/2021 140 (H) 70 - 99 mg/dL Final   08/24/2020 146 (H) 70 - 99 mg/dL Final     Comment:     Fasting specimen     Urea Nitrogen   Date Value Ref Range Status   10/29/2024 16.1 8.0 - 23.0 mg/dL Final   08/05/2021 23 7 - 30 mg/dL Final   08/24/2020 24 7 - 30 mg/dL Final     Creatinine   Date Value Ref Range Status   10/29/2024 0.92 0.67 - 1.17 mg/dL Final   08/24/2020 0.95 0.66 - 1.25 mg/dL Final     GFR Estimate   Date Value Ref Range Status   10/29/2024 86 >60 mL/min/1.73m2 Final     Comment:     eGFR calculated using 2021 CKD-EPI equation.   08/24/2020 79 >60  mL/min/[1.73_m2] Final     Comment:     Non  GFR Calc  Starting 12/18/2018, serum creatinine based estimated GFR (eGFR) will be   calculated using the Chronic Kidney Disease Epidemiology Collaboration   (CKD-EPI) equation.       Calcium   Date Value Ref Range Status   10/29/2024 9.4 8.8 - 10.4 mg/dL Final     Comment:     Reference intervals for this test were updated on 7/16/2024 to reflect our healthy population more accurately. There may be differences in the flagging of prior results with similar values performed with this method. Those prior results can be interpreted in the context of the updated reference intervals.   08/24/2020 8.5 8.5 - 10.1 mg/dL Final     Bilirubin Total   Date Value Ref Range Status   04/24/2024 0.3 <=1.2 mg/dL Final   08/24/2020 0.4 0.2 - 1.3 mg/dL Final     Alkaline Phosphatase   Date Value Ref Range Status   04/24/2024 80 40 - 150 U/L Final     Comment:     Reference intervals for this test were updated on 11/14/2023 to more accurately reflect our healthy population. There may be differences in the flagging of prior results with similar values performed with this method. Interpretation of those prior results can be made in the context of the updated reference intervals.   08/24/2020 69 40 - 150 U/L Final     ALT   Date Value Ref Range Status   04/24/2024 23 0 - 70 U/L Final     Comment:     Reference intervals for this test were updated on 6/12/2023 to more accurately reflect our healthy population. There may be differences in the flagging of prior results with similar values performed with this method. Interpretation of those prior results can be made in the context of the updated reference intervals.     08/24/2020 30 0 - 70 U/L Final     AST   Date Value Ref Range Status   04/24/2024 19 0 - 45 U/L Final     Comment:     Reference intervals for this test were updated on 6/12/2023 to more accurately reflect our healthy population. There may be differences in the  flagging of prior results with similar values performed with this method. Interpretation of those prior results can be made in the context of the updated reference intervals.   2020 15 0 - 45 U/L Final     Recent Labs   Lab Test 24  1451 23  1106   CHOL 121 131   HDL 64 62   LDL 29 47   TRIG 142 112      Lab Results   Component Value Date    A1C 7.4 10/29/2024    A1C 7.2 2024    A1C 7.4 10/24/2023    A1C 6.7 2023    A1C 7.3 11/15/2022    A1C 7.9 2021    A1C 7.3 2020    A1C 8.4 10/31/2019    A1C 7.5 2019    A1C 7.2 2018        Recent Results (from the past 4320 hours)   Echocardiogram Complete   Result Value    LVEF  60%    Narrative    [id number]  [id number]  [id number]  [id number]^[id number]^LIDIA^TIFFANI     [facility]  Echocardiography Laboratory  [address] [place] [address]     Name: LUDMILA CODY  MRN: [id number]  : 1950  Study Date: 10/25/2024 09:25 AM  Age: 74 yrs  Gender: Male  Patient Location: [facility]  Reason For Study: Aortic stenosis, moderate  Ordering Physician: LIDIA RANKIN  Referring Physician: LIDIA RANKIN  Performed By: Malcolm Curtis RDCS     BSA: 2.2 m2  Height: 69 in  Weight: 240 lb  HR: 73  BP: 122/77 mmHg  ______________________________________________________________________________  Procedure  Complete Echo Adult.  ______________________________________________________________________________  Interpretation Summary     1. The left ventricle is normal in structure, function and size. The visual  ejection fraction is estimated at 60%.  2. The right ventricle is normal in structure, function and size.  3. Moderate valvular aortic stenosis. Mean 18mmHg, ARMANDO 1.3cm2, DI 0.31.     Echo 10/2023 showed EF 60%, AS with mean 22mmHg, ARMANDO 1.2cm2, DI 0.33.  ______________________________________________________________________________  Left Ventricle  The left ventricle is normal in structure, function and size. There is normal  left  ventricular wall thickness. The visual ejection fraction is estimated at  60%. Left ventricular diastolic function is normal. Normal left ventricular  wall motion.     Right Ventricle  The right ventricle is normal in structure, function and size.     Atria  Normal left atrial size. Right atrial size is normal. There is no atrial shunt  seen.     Mitral Valve  The mitral valve is normal in structure and function.     Tricuspid Valve  There is trace tricuspid regurgitation.     Aortic Valve  Moderate valvular aortic stenosis.     Pulmonic Valve  The pulmonic valve is normal in structure and function.     Vessels  Normal ascending, transverse (arch), and descending aorta. The inferior vena  cava was normal in size with preserved respiratory variability.     Pericardium  There is no pericardial effusion.     Rhythm  Sinus rhythm was noted.  ______________________________________________________________________________  MMode/2D Measurements & Calculations  IVSd: 0.96 cm     LVIDd: 4.5 cm  LVIDs: 2.8 cm  LVPWd: 0.97 cm  FS: 38.4 %  LV mass(C)d: 146.6 grams  LV mass(C)dI: 65.7 grams/m2  Ao root diam: 3.6 cm  asc Aorta Diam: 3.1 cm  LVOT diam: 2.2 cm  LVOT area: 3.7 cm2  Ao root diam index Ht(cm/m): 2.1  Ao root diam index BSA (cm/m2): 1.6  Asc Ao diam index BSA (cm/m2): 1.4  Asc Ao diam index Ht(cm/m): 1.8  LA Volume (BP): 44.2 ml     LA Volume Index (BP): 19.8 ml/m2  RWT: 0.43  TAPSE: 2.7 cm     Time Measurements  Aortic HR: 70.0 BPM     Doppler Measurements & Calculations  MV E max bebeto: 83.8 cm/sec  MV A max bebeto: 108.0 cm/sec  MV E/A: 0.78  MV max P.1 mmHg  MV mean P.3 mmHg  MV V2 VTI: 29.9 cm  MVA(VTI): 2.8 cm2  MV dec time: 0.22 sec  Ao V2 max: 284.0 cm/sec  Ao max P.3 mmHg  Ao V2 mean: 194.0 cm/sec  Ao mean P.0 mmHg  Ao V2 VTI: 58.5 cm  ARMANDO(I,D): 1.4 cm2  ARMANDO(V,D): 1.2 cm2  LV V1 max PG: 3.2 mmHg  LV V1 max: 89.1 cm/sec  LV V1 VTI: 22.5 cm  CO(LVOT): 5.9 l/min  CI(LVOT): 2.6 l/min/m2  SV(LVOT):  83.9 ml  SI(LVOT): 37.6 ml/m2  PA acc time: 0.07 sec     AV Jorge Luis Ratio (DI): 0.31  ARMANDO Index (cm2/m2): 0.64  E/E' av.2  Lateral E/e': 10.4  Medial E/e': 12.0  RV S Jorge Luis: 12.9 cm/sec     ______________________________________________________________________________  Report approved by: Marisol Segovia 10/25/2024 11:51 AM

## 2025-03-22 ENCOUNTER — HEALTH MAINTENANCE LETTER (OUTPATIENT)
Age: 75
End: 2025-03-22

## 2025-04-15 DIAGNOSIS — E11.65 TYPE 2 DIABETES MELLITUS WITH HYPERGLYCEMIA, WITHOUT LONG-TERM CURRENT USE OF INSULIN (H): ICD-10-CM

## 2025-04-15 RX ORDER — GLIPIZIDE 5 MG/1
5 TABLET, FILM COATED, EXTENDED RELEASE ORAL DAILY
Qty: 90 TABLET | Refills: 0 | Status: SHIPPED | OUTPATIENT
Start: 2025-04-15

## 2025-04-22 ENCOUNTER — TRANSFERRED RECORDS (OUTPATIENT)
Dept: HEALTH INFORMATION MANAGEMENT | Facility: CLINIC | Age: 75
End: 2025-04-22
Payer: COMMERCIAL

## 2025-04-30 ENCOUNTER — OFFICE VISIT (OUTPATIENT)
Dept: PEDIATRICS | Facility: CLINIC | Age: 75
End: 2025-04-30
Payer: COMMERCIAL

## 2025-04-30 VITALS
TEMPERATURE: 96.6 F | SYSTOLIC BLOOD PRESSURE: 122 MMHG | OXYGEN SATURATION: 96 % | WEIGHT: 229.7 LBS | BODY MASS INDEX: 34.02 KG/M2 | HEART RATE: 68 BPM | DIASTOLIC BLOOD PRESSURE: 78 MMHG | HEIGHT: 69 IN

## 2025-04-30 DIAGNOSIS — E11.65 TYPE 2 DIABETES MELLITUS WITH HYPERGLYCEMIA, WITHOUT LONG-TERM CURRENT USE OF INSULIN (H): Primary | ICD-10-CM

## 2025-04-30 DIAGNOSIS — I10 ESSENTIAL HYPERTENSION WITH GOAL BLOOD PRESSURE LESS THAN 140/90: ICD-10-CM

## 2025-04-30 DIAGNOSIS — I35.0 AORTIC STENOSIS, MODERATE: ICD-10-CM

## 2025-04-30 DIAGNOSIS — I25.10 CORONARY ARTERY DISEASE INVOLVING NATIVE CORONARY ARTERY OF NATIVE HEART WITHOUT ANGINA PECTORIS: ICD-10-CM

## 2025-04-30 DIAGNOSIS — C61 PROSTATE CANCER (H): ICD-10-CM

## 2025-04-30 DIAGNOSIS — E78.5 HYPERLIPIDEMIA WITH TARGET LDL LESS THAN 70: ICD-10-CM

## 2025-04-30 DIAGNOSIS — Z12.11 SCREEN FOR COLON CANCER: ICD-10-CM

## 2025-04-30 LAB
ALBUMIN SERPL BCG-MCNC: 4.5 G/DL (ref 3.5–5.2)
ALP SERPL-CCNC: 73 U/L (ref 40–150)
ALT SERPL W P-5'-P-CCNC: 19 U/L (ref 0–70)
ANION GAP SERPL CALCULATED.3IONS-SCNC: 13 MMOL/L (ref 7–15)
AST SERPL W P-5'-P-CCNC: 20 U/L (ref 0–45)
BILIRUB SERPL-MCNC: 0.4 MG/DL
BUN SERPL-MCNC: 23.8 MG/DL (ref 8–23)
CALCIUM SERPL-MCNC: 9.9 MG/DL (ref 8.8–10.4)
CHLORIDE SERPL-SCNC: 100 MMOL/L (ref 98–107)
CHOLEST SERPL-MCNC: 130 MG/DL
CREAT SERPL-MCNC: 0.84 MG/DL (ref 0.67–1.17)
CREAT UR-MCNC: 55.3 MG/DL
EGFRCR SERPLBLD CKD-EPI 2021: 90 ML/MIN/1.73M2
EST. AVERAGE GLUCOSE BLD GHB EST-MCNC: 180 MG/DL
FASTING STATUS PATIENT QL REPORTED: ABNORMAL
FASTING STATUS PATIENT QL REPORTED: ABNORMAL
GLUCOSE SERPL-MCNC: 150 MG/DL (ref 70–99)
HBA1C MFR BLD: 7.9 % (ref 0–5.6)
HCO3 SERPL-SCNC: 23 MMOL/L (ref 22–29)
HDLC SERPL-MCNC: 60 MG/DL
LDLC SERPL CALC-MCNC: 36 MG/DL
MICROALBUMIN UR-MCNC: 79.6 MG/L
MICROALBUMIN/CREAT UR: 143.94 MG/G CR (ref 0–17)
NONHDLC SERPL-MCNC: 70 MG/DL
POTASSIUM SERPL-SCNC: 4.3 MMOL/L (ref 3.4–5.3)
PROT SERPL-MCNC: 7.2 G/DL (ref 6.4–8.3)
PSA SERPL DL<=0.01 NG/ML-MCNC: <0.01 NG/ML (ref 0–6.5)
SODIUM SERPL-SCNC: 136 MMOL/L (ref 135–145)
TRIGL SERPL-MCNC: 168 MG/DL

## 2025-04-30 PROCEDURE — 80053 COMPREHEN METABOLIC PANEL: CPT | Performed by: INTERNAL MEDICINE

## 2025-04-30 PROCEDURE — 82570 ASSAY OF URINE CREATININE: CPT | Performed by: INTERNAL MEDICINE

## 2025-04-30 PROCEDURE — 84153 ASSAY OF PSA TOTAL: CPT | Performed by: INTERNAL MEDICINE

## 2025-04-30 PROCEDURE — 80061 LIPID PANEL: CPT | Performed by: INTERNAL MEDICINE

## 2025-04-30 PROCEDURE — 83036 HEMOGLOBIN GLYCOSYLATED A1C: CPT | Performed by: INTERNAL MEDICINE

## 2025-04-30 PROCEDURE — 82043 UR ALBUMIN QUANTITATIVE: CPT | Performed by: INTERNAL MEDICINE

## 2025-04-30 PROCEDURE — 36415 COLL VENOUS BLD VENIPUNCTURE: CPT | Performed by: INTERNAL MEDICINE

## 2025-04-30 RX ORDER — METOPROLOL SUCCINATE 25 MG/1
12.5 TABLET, EXTENDED RELEASE ORAL DAILY
Qty: 45 TABLET | Refills: 3 | Status: SHIPPED | OUTPATIENT
Start: 2025-04-30

## 2025-04-30 RX ORDER — GLIPIZIDE 5 MG/1
5 TABLET, FILM COATED, EXTENDED RELEASE ORAL DAILY
Qty: 90 TABLET | Refills: 3 | Status: SHIPPED | OUTPATIENT
Start: 2025-04-30

## 2025-04-30 RX ORDER — PIOGLITAZONE 15 MG/1
TABLET ORAL
Qty: 90 TABLET | Refills: 3 | Status: SHIPPED | OUTPATIENT
Start: 2025-04-30

## 2025-04-30 RX ORDER — LISINOPRIL 10 MG/1
10 TABLET ORAL DAILY
Qty: 90 TABLET | Refills: 11 | Status: SHIPPED | OUTPATIENT
Start: 2025-04-30

## 2025-04-30 ASSESSMENT — PAIN SCALES - GENERAL: PAINLEVEL_OUTOF10: MILD PAIN (2)

## 2025-04-30 NOTE — PROGRESS NOTES
"  Assessment & Plan     (E11.65) Type 2 diabetes mellitus with hyperglycemia, without long-term current use of insulin (H)  (primary encounter diagnosis)  Comment:   Plan: Albumin Random Urine Quantitative with Creat         Ratio, HEMOGLOBIN A1C, empagliflozin         (JARDIANCE) 25 MG TABS tablet, glipiZIDE         (GLUCOTROL XL) 5 MG 24 hr tablet, metFORMIN         (GLUCOPHAGE) 1000 MG tablet, pioglitazone         (ACTOS) 15 MG tablet, FOOT EXAM           Adequate control, due for A1c.  Glp1ag not an option due to cost    (I25.10) Coronary artery disease involving native coronary artery of native heart without angina pectoris  (I35.0) Aortic stenosis, moderate  Plan: Lipid panel reflex to direct LDL Non-fasting,         metoprolol succinate ER (TOPROL XL) 25 MG 24 hr        tablet           Stable,2 prior stents. Continue asa, atorvastatin, metoprolol  Annual cardiology follow-up    (I10) Essential hypertension with goal blood pressure less than 140/90  Comment:   Plan: lisinopril (ZESTRIL) 10 MG tablet        Well controlled    (E78.5) Hyperlipidemia with target LDL less than 70  Comment:   Plan: Comprehensive metabolic panel (BMP + Alb, Alk         Phos, ALT, AST, Total. Bili, TP)        Well controlled    (C61) Prostate cancer (H):[place] 2010  Comment:   Plan: PSA tumor marker        Prior brachytherapy/no recurrence. Due for labwork    (Z12.11) Screen for colon cancer  Comment:   Plan: pt will schedule colonoscopy with [facility]    BMI  Estimated body mass index is 33.92 kg/m  as calculated from the following:    Height as of this encounter: 1.753 m (5' 9\").    Weight as of this encounter: 104.2 kg (229 lb 11.2 oz).             Randi Morales is a 74 year old, presenting for the following health issues:  Follow Up        4/30/2025     9:13 AM   Additional Questions   Roomed by Cindy CONNER   Accompanied by None         4/30/2025     9:13 AM   Patient Reported Additional Medications   Patient reports taking the " following new medications None     History of Present Illness       Diabetes:   He presents for follow up of diabetes.  He is checking home blood glucose a few times a month.   He checks blood glucose before meals.  Blood glucose is never over 200 and never under 70. He is aware of hypoglycemia symptoms including shakiness.   He is concerned about low blood sugar, several less than 70 in the past few weeks.    He is not experiencing numbness or burning in feet, excessive thirst, blurry vision, weight changes or redness, sores or blisters on feet.           He eats 2-3 servings of fruits and vegetables daily.He consumes 0 sweetened beverage(s) daily.He exercises with enough effort to increase his heart rate 10 to 19 minutes per day.  He exercises with enough effort to increase his heart rate 5 days per week.   He is taking medications regularly.        BP     122/78  4/30/2025    Lab Results   Component Value Date     10/29/2024     08/05/2021     08/24/2020     Lab Results   Component Value Date    A1C 7.4 10/29/2024    A1C 7.9 01/11/2021     Lab Results   Component Value Date    LDL 29 04/24/2024    LDL 43 08/24/2020     Lab Results   Component Value Date    MICROL 75.1 10/24/2023    MICROL 38 08/05/2021    MICROL 109 08/24/2020     Patient Active Problem List   Diagnosis    Prostate cancer (H):[place] 2010    Colon polyps    Chronic right shoulder pain- recurrent since 2013    Cervical spinal stenosis    Type 2 diabetes mellitus with hyperglycemia, without long-term current use of insulin (H)    Essential hypertension with goal blood pressure less than 140/90    Hyperlipidemia with target LDL less than 70    Coronary artery disease involving native coronary artery of native heart without angina pectoris    Diverticulosis of large intestine    Aortic stenosis, moderate    Class 1 obesity due to excess calories with serious comorbidity and body mass index (BMI) of 34.0 to 34.9 in adult     Current  "Outpatient Medications   Medication Sig Dispense Refill    aspirin 81 MG tablet Take 1 tablet (81 mg) by mouth daily 90 tablet 11    atorvastatin (LIPITOR) 40 MG tablet Take 1 tablet (40 mg) by mouth at bedtime. 90 tablet 3    empagliflozin (JARDIANCE) 25 MG TABS tablet Take 1 tablet (25 mg) by mouth daily 90 tablet 3    glipiZIDE (GLUCOTROL XL) 5 MG 24 hr tablet Take 1 tablet by mouth once daily 90 tablet 0    lisinopril (ZESTRIL) 10 MG tablet Take 1 tablet (10 mg) by mouth daily 90 tablet 11    metFORMIN (GLUCOPHAGE) 1000 MG tablet TAKE 1 TABLET BY MOUTH TWICE DAILY WITH MEALS 180 tablet 3    metoprolol succinate ER (TOPROL XL) 25 MG 24 hr tablet Take 1/2 (one-half) tablet by mouth once daily 45 tablet 1    multivitamin, therapeutic (THERA-VIT) TABS tablet Take 1 tablet by mouth daily      nitroGLYcerin (NITROSTAT) 0.4 MG sublingual tablet For chest pain place 1 tablet under the tongue every 5 minutes for 3 doses. If symptoms persist 5 minutes after 1st dose call 911. 25 tablet 3    omeprazole 20 MG tablet Take 20 mg by mouth daily.      pioglitazone (ACTOS) 15 MG tablet TAKE 1 TABLET EVERY DAY 90 tablet 3    tamsulosin (FLOMAX) 0.4 MG capsule Take 1 capsule (0.4 mg) by mouth daily 90 capsule 11    triamcinolone (KENALOG) 0.1 % external cream Apply topically 2 times daily as needed for irritation 80 g 1    benzonatate (TESSALON) 100 MG capsule Take 1-2 capsules (100-200 mg) by mouth 3 times daily as needed for cough. (Patient not taking: Reported on 4/30/2025) 30 capsule 0              Objective    /78 (BP Location: Right arm, Patient Position: Sitting, Cuff Size: Adult Regular)   Pulse 68   Temp (!) 96.6  F (35.9  C) (Temporal)   Ht 1.753 m (5' 9\")   Wt 104.2 kg (229 lb 11.2 oz)   SpO2 96%   BMI 33.92 kg/m    Body mass index is 33.92 kg/m .  Physical Exam   GENERAL: alert and no distress  NECK: no adenopathy, no asymmetry, masses, or scars  RESP: lungs clear to auscultation - no rales, rhonchi or " wheezes  CV: regular rate and rhythm, normal S1 S2, no S3 or S4, 2/6 systolic murmur radiates to carotids  MS: no gross musculoskeletal defects noted, no edema       Diabetic foot exam: without skin lesions.  Normal sensation  PSYCH: mentation appears normal, affect normal/bright        Signed Electronically by: Jesus Cherry MD

## 2025-05-02 ENCOUNTER — TRANSFERRED RECORDS (OUTPATIENT)
Dept: HEALTH INFORMATION MANAGEMENT | Facility: CLINIC | Age: 75
End: 2025-05-02

## 2025-05-06 ENCOUNTER — TRANSFERRED RECORDS (OUTPATIENT)
Dept: HEALTH INFORMATION MANAGEMENT | Facility: CLINIC | Age: 75
End: 2025-05-06
Payer: COMMERCIAL

## 2025-05-09 ENCOUNTER — RESULTS FOLLOW-UP (OUTPATIENT)
Dept: PEDIATRICS | Facility: CLINIC | Age: 75
End: 2025-05-09

## 2025-06-22 DIAGNOSIS — E11.65 TYPE 2 DIABETES MELLITUS WITH HYPERGLYCEMIA, WITHOUT LONG-TERM CURRENT USE OF INSULIN (H): ICD-10-CM

## 2025-06-23 ENCOUNTER — TRANSFERRED RECORDS (OUTPATIENT)
Dept: HEALTH INFORMATION MANAGEMENT | Facility: CLINIC | Age: 75
End: 2025-06-23
Payer: COMMERCIAL

## 2025-07-10 ENCOUNTER — TRANSFERRED RECORDS (OUTPATIENT)
Dept: HEALTH INFORMATION MANAGEMENT | Facility: CLINIC | Age: 75
End: 2025-07-10

## (undated) DEVICE — INTRO GLIDESHEATH SLENDER 6FR 10X45CM 60-1060

## (undated) DEVICE — CATH GUIDING 6FR AL .75 LA6AL75

## (undated) DEVICE — GW VASC OMNIWIRE J L185CM PRESSURE 89185J

## (undated) DEVICE — HEMOSTAT COMPRESSION VASC REGULAR OD24 CM 3524

## (undated) DEVICE — MANIFOLD KIT ANGIO AUTOMATED 014613

## (undated) DEVICE — DEFIB PRO-PADZ LVP LQD GEL ADULT 8900-2105-01

## (undated) DEVICE — Device

## (undated) DEVICE — KIT LG BORE TOUHY ACCESS PLUS MAP152

## (undated) DEVICE — CATH GUIDING BLUE YELLOW PTFE XB3 6FRX100CM 67005200

## (undated) DEVICE — WIRE GUIDE 0.035"X260CM SAFE-T-J EXCHANGE G00517

## (undated) DEVICE — CATH JACKY 5FR 3.5 CURVE 40-5023

## (undated) DEVICE — KIT HAND CONTROL ANGIOTOUCH ACIST 65CM AT-P65

## (undated) RX ORDER — REGADENOSON 0.08 MG/ML
INJECTION, SOLUTION INTRAVENOUS
Status: DISPENSED
Start: 2021-12-07